# Patient Record
Sex: MALE | Race: WHITE | NOT HISPANIC OR LATINO | Employment: OTHER | URBAN - METROPOLITAN AREA
[De-identification: names, ages, dates, MRNs, and addresses within clinical notes are randomized per-mention and may not be internally consistent; named-entity substitution may affect disease eponyms.]

---

## 2018-01-11 ENCOUNTER — OFFICE VISIT CONVERTED (OUTPATIENT)
Dept: ORTHOPEDIC SURGERY | Facility: CLINIC | Age: 68
End: 2018-01-11
Attending: PHYSICIAN ASSISTANT

## 2018-01-11 ENCOUNTER — CONVERSION ENCOUNTER (OUTPATIENT)
Dept: ORTHOPEDIC SURGERY | Facility: CLINIC | Age: 68
End: 2018-01-11

## 2018-02-09 ENCOUNTER — OFFICE VISIT CONVERTED (OUTPATIENT)
Dept: PULMONOLOGY | Facility: CLINIC | Age: 68
End: 2018-02-09
Attending: INTERNAL MEDICINE

## 2018-04-16 ENCOUNTER — OFFICE VISIT CONVERTED (OUTPATIENT)
Dept: CARDIOLOGY | Facility: CLINIC | Age: 68
End: 2018-04-16
Attending: INTERNAL MEDICINE

## 2018-04-16 ENCOUNTER — CONVERSION ENCOUNTER (OUTPATIENT)
Dept: CARDIOLOGY | Facility: CLINIC | Age: 68
End: 2018-04-16

## 2018-05-31 ENCOUNTER — OFFICE VISIT CONVERTED (OUTPATIENT)
Dept: OTOLARYNGOLOGY | Facility: CLINIC | Age: 68
End: 2018-05-31
Attending: OTOLARYNGOLOGY

## 2018-10-11 ENCOUNTER — OFFICE VISIT CONVERTED (OUTPATIENT)
Dept: PLASTIC SURGERY | Facility: CLINIC | Age: 68
End: 2018-10-11
Attending: PLASTIC SURGERY

## 2018-10-11 ENCOUNTER — CONVERSION ENCOUNTER (OUTPATIENT)
Dept: PLASTIC SURGERY | Facility: CLINIC | Age: 68
End: 2018-10-11

## 2018-10-18 ENCOUNTER — OFFICE VISIT CONVERTED (OUTPATIENT)
Dept: CARDIOLOGY | Facility: CLINIC | Age: 68
End: 2018-10-18
Attending: INTERNAL MEDICINE

## 2018-10-25 ENCOUNTER — OFFICE VISIT CONVERTED (OUTPATIENT)
Dept: ORTHOPEDIC SURGERY | Facility: CLINIC | Age: 68
End: 2018-10-25
Attending: ORTHOPAEDIC SURGERY

## 2019-01-04 ENCOUNTER — OFFICE VISIT CONVERTED (OUTPATIENT)
Dept: PLASTIC SURGERY | Facility: CLINIC | Age: 69
End: 2019-01-04
Attending: PLASTIC SURGERY

## 2019-01-04 ENCOUNTER — CONVERSION ENCOUNTER (OUTPATIENT)
Dept: PLASTIC SURGERY | Facility: CLINIC | Age: 69
End: 2019-01-04

## 2019-02-08 ENCOUNTER — OFFICE VISIT CONVERTED (OUTPATIENT)
Dept: PULMONOLOGY | Facility: CLINIC | Age: 69
End: 2019-02-08
Attending: INTERNAL MEDICINE

## 2019-02-26 ENCOUNTER — PROCEDURE VISIT CONVERTED (OUTPATIENT)
Dept: OTHER | Facility: HOSPITAL | Age: 69
End: 2019-02-26
Attending: PLASTIC SURGERY

## 2019-02-26 ENCOUNTER — HOSPITAL ENCOUNTER (OUTPATIENT)
Dept: PERIOP | Facility: HOSPITAL | Age: 69
Setting detail: HOSPITAL OUTPATIENT SURGERY
Discharge: HOME | End: 2019-02-26
Attending: PLASTIC SURGERY

## 2019-02-26 LAB
GLUCOSE BLD-MCNC: 63 MG/DL (ref 70–99)
GLUCOSE BLD-MCNC: 83 MG/DL (ref 70–99)

## 2019-03-11 ENCOUNTER — CONVERSION ENCOUNTER (OUTPATIENT)
Dept: PLASTIC SURGERY | Facility: CLINIC | Age: 69
End: 2019-03-11

## 2019-03-11 ENCOUNTER — OFFICE VISIT CONVERTED (OUTPATIENT)
Dept: PLASTIC SURGERY | Facility: CLINIC | Age: 69
End: 2019-03-11
Attending: PLASTIC SURGERY

## 2019-05-06 ENCOUNTER — CONVERSION ENCOUNTER (OUTPATIENT)
Dept: CARDIOLOGY | Facility: CLINIC | Age: 69
End: 2019-05-06

## 2019-05-06 ENCOUNTER — OFFICE VISIT CONVERTED (OUTPATIENT)
Dept: CARDIOLOGY | Facility: CLINIC | Age: 69
End: 2019-05-06
Attending: INTERNAL MEDICINE

## 2019-05-30 ENCOUNTER — OFFICE VISIT CONVERTED (OUTPATIENT)
Dept: OTOLARYNGOLOGY | Facility: CLINIC | Age: 69
End: 2019-05-30
Attending: OTOLARYNGOLOGY

## 2019-11-18 ENCOUNTER — OFFICE VISIT CONVERTED (OUTPATIENT)
Dept: CARDIOLOGY | Facility: CLINIC | Age: 69
End: 2019-11-18
Attending: INTERNAL MEDICINE

## 2019-12-10 ENCOUNTER — HOSPITAL ENCOUNTER (OUTPATIENT)
Dept: GASTROENTEROLOGY | Facility: HOSPITAL | Age: 69
Setting detail: HOSPITAL OUTPATIENT SURGERY
Discharge: HOME | End: 2019-12-10
Attending: INTERNAL MEDICINE

## 2020-07-09 ENCOUNTER — OFFICE VISIT CONVERTED (OUTPATIENT)
Dept: ORTHOPEDIC SURGERY | Facility: CLINIC | Age: 70
End: 2020-07-09
Attending: ORTHOPAEDIC SURGERY

## 2020-08-19 ENCOUNTER — OFFICE VISIT CONVERTED (OUTPATIENT)
Dept: CARDIOLOGY | Facility: CLINIC | Age: 70
End: 2020-08-19
Attending: INTERNAL MEDICINE

## 2020-08-19 ENCOUNTER — HOSPITAL ENCOUNTER (OUTPATIENT)
Dept: LAB | Facility: HOSPITAL | Age: 70
Discharge: HOME | End: 2020-08-19
Attending: INTERNAL MEDICINE

## 2020-08-19 LAB
ALBUMIN SERPL-MCNC: 4.1 G/DL (ref 3.5–5)
ALBUMIN/GLOB SERPL: 1.5 {RATIO} (ref 1.4–2.6)
ALP SERPL-CCNC: 70 U/L (ref 56–155)
ALT SERPL-CCNC: 34 U/L (ref 10–40)
ANION GAP SERPL CALC-SCNC: 21 MMOL/L (ref 8–19)
AST SERPL-CCNC: 41 U/L (ref 15–50)
BILIRUB SERPL-MCNC: 0.76 MG/DL (ref 0.2–1.3)
BUN SERPL-MCNC: 17 MG/DL (ref 5–25)
BUN/CREAT SERPL: 11 {RATIO} (ref 6–20)
CALCIUM SERPL-MCNC: 10.1 MG/DL (ref 8.7–10.4)
CHLORIDE SERPL-SCNC: 104 MMOL/L (ref 99–111)
CHOLEST SERPL-MCNC: 133 MG/DL (ref 107–200)
CHOLEST/HDLC SERPL: 3.2 {RATIO} (ref 3–6)
CONV CO2: 23 MMOL/L (ref 22–32)
CONV TOTAL PROTEIN: 6.8 G/DL (ref 6.3–8.2)
CREAT UR-MCNC: 1.55 MG/DL (ref 0.7–1.2)
GFR SERPLBLD BASED ON 1.73 SQ M-ARVRAT: 44 ML/MIN/{1.73_M2}
GLOBULIN UR ELPH-MCNC: 2.7 G/DL (ref 2–3.5)
GLUCOSE SERPL-MCNC: 116 MG/DL (ref 70–99)
HDLC SERPL-MCNC: 41 MG/DL (ref 40–60)
LDLC SERPL CALC-MCNC: 56 MG/DL (ref 70–100)
OSMOLALITY SERPL CALC.SUM OF ELEC: 301 MOSM/KG (ref 273–304)
POTASSIUM SERPL-SCNC: 4.2 MMOL/L (ref 3.5–5.3)
SODIUM SERPL-SCNC: 144 MMOL/L (ref 135–147)
TRIGL SERPL-MCNC: 181 MG/DL (ref 40–150)
VLDLC SERPL-MCNC: 36 MG/DL (ref 5–37)

## 2020-09-02 ENCOUNTER — HOSPITAL ENCOUNTER (OUTPATIENT)
Dept: LAB | Facility: HOSPITAL | Age: 70
Discharge: HOME | End: 2020-09-02
Attending: INTERNAL MEDICINE

## 2020-09-02 LAB
ANION GAP SERPL CALC-SCNC: 15 MMOL/L (ref 8–19)
BUN SERPL-MCNC: 16 MG/DL (ref 5–25)
BUN/CREAT SERPL: 11 {RATIO} (ref 6–20)
CALCIUM SERPL-MCNC: 10.2 MG/DL (ref 8.7–10.4)
CHLORIDE SERPL-SCNC: 103 MMOL/L (ref 99–111)
CONV CO2: 26 MMOL/L (ref 22–32)
CREAT UR-MCNC: 1.49 MG/DL (ref 0.7–1.2)
GFR SERPLBLD BASED ON 1.73 SQ M-ARVRAT: 46 ML/MIN/{1.73_M2}
GLUCOSE SERPL-MCNC: 109 MG/DL (ref 70–99)
OSMOLALITY SERPL CALC.SUM OF ELEC: 292 MOSM/KG (ref 273–304)
POTASSIUM SERPL-SCNC: 4.1 MMOL/L (ref 3.5–5.3)
SODIUM SERPL-SCNC: 140 MMOL/L (ref 135–147)

## 2020-09-18 ENCOUNTER — HOSPITAL ENCOUNTER (OUTPATIENT)
Dept: LAB | Facility: HOSPITAL | Age: 70
Discharge: HOME | End: 2020-09-18
Attending: INTERNAL MEDICINE

## 2020-09-18 LAB
ANION GAP SERPL CALC-SCNC: 15 MMOL/L (ref 8–19)
BUN SERPL-MCNC: 16 MG/DL (ref 5–25)
BUN/CREAT SERPL: 12 {RATIO} (ref 6–20)
CALCIUM SERPL-MCNC: 9.2 MG/DL (ref 8.7–10.4)
CHLORIDE SERPL-SCNC: 104 MMOL/L (ref 99–111)
CONV CO2: 24 MMOL/L (ref 22–32)
CREAT UR-MCNC: 1.29 MG/DL (ref 0.7–1.2)
GFR SERPLBLD BASED ON 1.73 SQ M-ARVRAT: 55 ML/MIN/{1.73_M2}
GLUCOSE SERPL-MCNC: 93 MG/DL (ref 70–99)
MAGNESIUM SERPL-MCNC: 1.74 MG/DL (ref 1.6–2.3)
OSMOLALITY SERPL CALC.SUM OF ELEC: 289 MOSM/KG (ref 273–304)
POTASSIUM SERPL-SCNC: 3.7 MMOL/L (ref 3.5–5.3)
SODIUM SERPL-SCNC: 139 MMOL/L (ref 135–147)

## 2021-01-22 ENCOUNTER — HOSPITAL ENCOUNTER (OUTPATIENT)
Dept: OTHER | Facility: HOSPITAL | Age: 71
Discharge: HOME | End: 2021-01-22
Attending: INTERNAL MEDICINE

## 2021-02-18 ENCOUNTER — HOSPITAL ENCOUNTER (OUTPATIENT)
Dept: VACCINE CLINIC | Facility: HOSPITAL | Age: 71
Discharge: HOME | End: 2021-02-18
Attending: INTERNAL MEDICINE

## 2021-03-01 ENCOUNTER — HOSPITAL ENCOUNTER (OUTPATIENT)
Dept: LAB | Facility: HOSPITAL | Age: 71
Discharge: HOME | End: 2021-03-01
Attending: INTERNAL MEDICINE

## 2021-03-01 LAB
ALBUMIN SERPL-MCNC: 4.3 G/DL (ref 3.5–5)
ALBUMIN/GLOB SERPL: 1.6 {RATIO} (ref 1.4–2.6)
ALP SERPL-CCNC: 80 U/L (ref 56–155)
ALT SERPL-CCNC: 33 U/L (ref 10–40)
ANION GAP SERPL CALC-SCNC: 16 MMOL/L (ref 8–19)
AST SERPL-CCNC: 34 U/L (ref 15–50)
BILIRUB SERPL-MCNC: 0.84 MG/DL (ref 0.2–1.3)
BUN SERPL-MCNC: 17 MG/DL (ref 5–25)
BUN/CREAT SERPL: 12 {RATIO} (ref 6–20)
CALCIUM SERPL-MCNC: 9.2 MG/DL (ref 8.7–10.4)
CHLORIDE SERPL-SCNC: 106 MMOL/L (ref 99–111)
CHOLEST SERPL-MCNC: 109 MG/DL (ref 107–200)
CHOLEST/HDLC SERPL: 2.9 {RATIO} (ref 3–6)
CONV CO2: 23 MMOL/L (ref 22–32)
CONV TOTAL PROTEIN: 7 G/DL (ref 6.3–8.2)
CREAT UR-MCNC: 1.37 MG/DL (ref 0.7–1.2)
GFR SERPLBLD BASED ON 1.73 SQ M-ARVRAT: 51 ML/MIN/{1.73_M2}
GLOBULIN UR ELPH-MCNC: 2.7 G/DL (ref 2–3.5)
GLUCOSE SERPL-MCNC: 109 MG/DL (ref 70–99)
HDLC SERPL-MCNC: 38 MG/DL (ref 40–60)
LDLC SERPL CALC-MCNC: 37 MG/DL (ref 70–100)
MAGNESIUM SERPL-MCNC: 2.12 MG/DL (ref 1.6–2.3)
OSMOLALITY SERPL CALC.SUM OF ELEC: 294 MOSM/KG (ref 273–304)
POTASSIUM SERPL-SCNC: 4.1 MMOL/L (ref 3.5–5.3)
SODIUM SERPL-SCNC: 141 MMOL/L (ref 135–147)
TRIGL SERPL-MCNC: 168 MG/DL (ref 40–150)
VLDLC SERPL-MCNC: 34 MG/DL (ref 5–37)

## 2021-03-08 ENCOUNTER — OFFICE VISIT CONVERTED (OUTPATIENT)
Dept: CARDIOLOGY | Facility: CLINIC | Age: 71
End: 2021-03-08
Attending: INTERNAL MEDICINE

## 2021-03-09 ENCOUNTER — OFFICE VISIT CONVERTED (OUTPATIENT)
Dept: CARDIOLOGY | Facility: CLINIC | Age: 71
End: 2021-03-09
Attending: INTERNAL MEDICINE

## 2021-03-22 ENCOUNTER — HOSPITAL ENCOUNTER (OUTPATIENT)
Dept: LAB | Facility: HOSPITAL | Age: 71
Discharge: HOME | End: 2021-03-22
Attending: INTERNAL MEDICINE

## 2021-03-22 LAB
ANION GAP SERPL CALC-SCNC: 13 MMOL/L (ref 8–19)
BUN SERPL-MCNC: 17 MG/DL (ref 5–25)
BUN/CREAT SERPL: 12 {RATIO} (ref 6–20)
CALCIUM SERPL-MCNC: 9.3 MG/DL (ref 8.7–10.4)
CHLORIDE SERPL-SCNC: 106 MMOL/L (ref 99–111)
CONV CO2: 26 MMOL/L (ref 22–32)
CREAT UR-MCNC: 1.4 MG/DL (ref 0.7–1.2)
GFR SERPLBLD BASED ON 1.73 SQ M-ARVRAT: 49 ML/MIN/{1.73_M2}
GLUCOSE SERPL-MCNC: 108 MG/DL (ref 70–99)
OSMOLALITY SERPL CALC.SUM OF ELEC: 294 MOSM/KG (ref 273–304)
POTASSIUM SERPL-SCNC: 4.2 MMOL/L (ref 3.5–5.3)
SODIUM SERPL-SCNC: 141 MMOL/L (ref 135–147)
T4 FREE SERPL-MCNC: 1.3 NG/DL (ref 0.9–1.8)
TSH SERPL-ACNC: 5.14 M[IU]/L (ref 0.27–4.2)

## 2021-05-10 NOTE — PROCEDURES
Procedure Note      Patient Name: Percy Velarde   Patient ID: [id number]   Sex: Male   YOB: 1950    Primary Care Provider: Agustin Balbuena MD   Referring Provider: Norman Bettencourt MD    Visit Date: March 9, 2021    Provider: Jackie Ochoa MD   Location: [facility]   Location Address: [address] [place] [address], [facility] [[email or web address] A   [place], [place]  [id number]   Location Phone: [phone]          FINAL REPORT   HOLTER MONITOR REPORT  Date: 03/09/2021   Indication: Dizziness and Bradycardia      Interpretation Date:  04/09/2021    14-DAY HOLTER MONITOR    FINDINGS:   The predominant rhythm was sinus bradycardia to sinus tachycardia with sinus arrhythmia and IVCD. The maximum heart rate was 114 beats per minute on day 10 at 4:41 PM. The minimum heart rate was 39 beats per minute on day 8 at 12:37 AM. Average heart rate was 57 beats per minute. There were 241 PVCs with a burden of 0.02%. There were 163 PACs with a burden of 0.01%. There were 4 episodes of supraventricular tachycardia with the longest episode 10 beats and the fastest episode 92 beats per minute, both on day 13 at 6:03 PM. Diary events were entered. There was one patient-triggered event. No arrhythmias recorded during patient symptomatology. Underlying rhythm was 60 beats per minute and the patient triggered the monitor with dizziness.     CONCLUSION:  Fourteen-day monitor showed predominantly sinus rhythm with occasional PVCs, PACs, and a rare case of nonsustained supraventricular tachycardia. Mean heart rate is on the low side at 57 beats per minute.      Jackie Ochoa MD, [facility]  PM/pap                   Electronically Signed by: Jaqui Landry-, Other -Author on April 13, 2021 10:17:06 AM  Electronically Co-signed by: Jackie Ochoa MD -Reviewer on April 20, 2021 01:28:15 PM

## 2021-05-10 NOTE — H&P
History and Physical      Patient Name: Percy Velarde   Patient ID: [id number]   Sex: Male   YOB: 1950    Primary Care Provider: Agustin Balbuena MD   Referring Provider: Norman Bettencourt MD    Visit Date: July 9, 2020    Provider: Bao Vigil MD   Location: [facility]   Location Address: [address] [place] [address]  [id number]   Location Phone: [phone]          Chief Complaint  · Right elbow pain      History Of Present Illness  Percy Velarde is a 70 year old /White male who presents today to [facility]. He is here for evaluation of his right elbow. He has had symptoms for 3-4 months, some days it is pretty bad where he cant  his coffee cup. He localizes it over his lateral elbow. No real treatment has been started.       Past Medical History  Aftercare following right shoulder arthroscopy; Arthritis; Colonic Polyps, Personal History of; COPD; ETD (Eustachian tube dysfunction), bilateral; GERD (gastroesophageal reflux disease); Gout; Hearing loss; History of Right shoulder arthroscopy; Hyperlipemia; Hypertension; Hypertension, essential; Hypothyroidism; Limb Swelling; Lipoma; Low back pain; Melanoma; Nasal septal ulcer; Otalgia; Recurrent epistaxis; Recurrent otitis media; Right Shoulder: Rotator Cuff Tear; Seasonal allergies; Sleep apnea; Thyroid disorder; Tinnitus, bilateral         Past Surgical History  Cholecystectomy; Colonoscopy; EGD; Eye Implant; Gallbladder; Joint Surgery; Lipoma Removal; Melanoma excision; Myringotomy: Ear Tubes; Tonsillectomy         Medication List  Afrin (oxymetazoline) 0.05 % nasal spray,non-aerosol; Allergy Relief (fexofenadine) 180 mg oral tablet; allopurinol 100 mg oral tablet; Ambien 10 mg oral tablet; aspirin 81 mg oral tablet,delayed release (DR/EC); clindamycin phosphate 1 % topical gel; fluticasone 50 mcg/actuation nasal spray,suspension; levothyroxine 125 mcg oral tablet; losartan-hydrochlorothiazide 50-12.5  "mg oral tablet; montelukast 10 mg oral tablet; pantoprazole 40 mg oral tablet,delayed release (DR/EC); pravastatin 80 mg oral tablet; Suprep Bowel Prep Kit 17.5-3.13-1.6 gram oral recon soln; trazodone 50 mg oral tablet; Vitamin C 500 mg oral capsule, extended release; Voltaren 1 % topical gel         Allergy List  NO KNOWN DRUG ALLERGIES         Family Medical History  Heart Disease; Diabetes, unspecified type; Colon Cancer; Heart Attack (MI); Diabetes         Social History  Alcohol Use (Current some day); lives alone; lives with spouse; .; Recreational Drug Use (Never); Retired; Smokeless tobacco (Former); Tobacco (Former);          Review of Systems  · Constitutional  o Denies  o : fever, chills, weight loss  · Cardiovascular  o Denies  o : chest pain, shortness of breath  · Gastrointestinal  o Denies  o : liver disease, heartburn, nausea, blood in stools  · Genitourinary  o Denies  o : painful urination, blood in urine  · Integument  o Denies  o : rash, itching  · Neurologic  o Denies  o : headache, weakness, loss of consciousness  · Musculoskeletal  o Denies  o : painful, swollen joints  · Psychiatric  o Denies  o : drug/alcohol addiction, anxiety, depression      Vitals  Date Time BP Position Site L\R Cuff Size HR RR TEMP (F) WT  HT  BMI kg/m2 BSA m2 O2 Sat        07/09/2020 08:43 AM      63 - R   240lbs 0oz 5'  10\" 34.44 2.32 95 %          Physical Examination  · Constitutional  o Appearance  o : well developed, well-nourished, no obvious deformities present  · Head and Face  o Head  o :   § Inspection  § : normocephalic  o Face  o :   § Inspection  § : no facial lesions  · Eyes  o Conjunctivae  o : conjunctivae normal  o Sclerae  o : sclerae white  · Ears, Nose, Mouth and Throat  o Ears  o :   § External Ears  § : appearance within normal limits  § Hearing  § : intact  o Nose  o :   § External Nose  § : appearance normal  · Neck  o Inspection/Palpation  o : normal appearance  o Range of " Motion  o : full range of motion  · Respiratory  o Respiratory Effort  o : breathing unlabored  o Inspection of Chest  o : normal appearance  o Auscultation of Lungs  o : no audible wheezing or rales  · Cardiovascular  o Heart  o : regular rate  · Gastrointestinal  o Abdominal Examination  o : soft and non-tender  · Skin and Subcutaneous Tissue  o General Inspection  o : intact, no rashes  · Psychiatric  o General  o : Alert and oriented x3  o Judgement and Insight  o : judgment and insight intact  o Mood and Affect  o : mood normal, affect appropriate  · Right Elbow  o Inspection  o : He is tender to palpation over his lateral epicondyle. Positive provocative testing for tennis elbow. Sensation intact. Pulse is normal.  strength is good.   · In Office Procedures  o View  o : AP/LATERAL  o Site  o : right, elbow   o Indication  o : Right arm pain   o Study  o : X-rays ordered, taken in the office, and reviewed today.  o Xray  o : X-rays show no fractures.   o Comparative Data  o : No comparative data found          Assessment  · Pain: Elbow     719.42/M25.529  · Lateral epicondylitis, right elbow.     726.32/M77.10      Plan  · Orders  o Elbow (Right) 2 views X-Ray [facility] ([id number]) - 719.42/M25.529 - 07/09/2020  · Medications  o Medications have been Reconciled  o Transition of Care or Provider Policy  · Instructions  o Reviewed the patient's Past Medical, Social, and Family history as well as the ROS at today's visit, no changes.  o Call or return if worsening symptoms.  o This note is transcribed by Claritza Molina /[email or web address]  o Going to try Voltaren gel, some stretches and see how he does.             Electronically Signed by: Claritza Molina, -Author on July 10, 2020 11:40:34 AM  Electronically Co-signed by: Bao Vigil MD -Reviewer on July 14, 2020 03:16:20 PM

## 2021-05-13 NOTE — PROGRESS NOTES
Progress Note      Patient Name: Percy Velarde   Patient ID: [id number]   Sex: Male   YOB: 1950    Primary Care Provider: Agustin Balbuena MD   Referring Provider: Norman Bettencourt MD    Visit Date: August 19, 2020    Provider: Jackie Ochoa MD   Location: [facility]   Location Address: [address] [place] [address], [facility]   [place]  [id number]   Location Phone: [phone]          Chief Complaint  · Coronary artery disease   · Hypertension   · Hyperlipidemia       History Of Present Illness  REFERRING PROVIDER: Luca Vance MD   Percy Velarde is a 70-year-old gentleman with coronary artery disease, hypertension, hyperlipidemia, who is doing well. He denies any chest pain, shortness of breath, PND, orthopnea, palpitations, dizziness or syncope. His major complaint is swelling in his legs, which has become slightly worse over time.   PAST MEDICAL HISTORY: Positive for coronary artery disease (catheterization in 2003), hypothyroidism, chronic kidney disease, hypertension and hyperlipidemia.   FAMILY HISTORY: Positive for diabetes. Negative for hypertension or heart disease.   PSYCHOSOCIAL HISTORY: No history of mood changes or depression. He drinks a moderate amount of alcohol. He previously used tobacco but quit.   CURRENT MEDICATIONS: include Levothyroxine 137 mcg daily; Losartan 50 mg daily; Protonix 40 mg daily; Allopurinol 100 mg daily; Montelukast 10 mg daily; Atorvastatin 40 mg daily; Trazodone 50 mg b.i.d. p.r.n.; Fluticasone spray; Vitamin C; aspirin 81 mg daily; HCTZ 12.5 mg daily. The dosage and frequency of the medications were reviewed with the patient.       Review of Systems  · Cardiovascular  o Admits  o : swelling (feet, ankles, hands)  o Denies  o : palpitations (fast, fluttering, or skipping beats), shortness of breath while walking or lying flat, chest pain or angina pectoris   · Respiratory  o Denies  o : chronic or frequent cough, asthma or  "wheezing      Vitals  Date Time BP Position Site L\R Cuff Size HR RR TEMP (F) WT  HT  BMI kg/m2 BSA m2 O2 Sat        08/19/2020 08:35 /82 Sitting    56 - R   241lbs 16oz 5'  9\" 35.74 2.31           Physical Examination  · Constitutional  o Appearance  o : Awake, alert, in no acute distress.  · Eyes  o Conjunctivae  o : Conjunctivae normal.  · Ears, Nose, Mouth and Throat  o Oral Cavity  o :   § Oral Mucosa  § : Normal.  · Neck  o Inspection/Palpation/Auscultation  o : No lymphadenopathy. No JVD. No bruit. Good carotid upstroke.  · Respiratory  o Respiratory  o : Increased AP diameter with diminished breath sounds. Prolonged expiration. No rales or rhonchi. No dullness on percussion.   · Cardiovascular  o Heart  o : PMI is not well felt. S1, S2 are normal. No S3. S4+. There is a 1-2/6 ejection systolic murmur at the base.   o Peripheral Vascular System  o :   § Extremities  § : Good femoral and pedal pulses. No pedal edema.  · Gastrointestinal  o Abdominal Examination  o : Soft. No masses or tenderness felt. No hepatosplenomegaly. Abdominal aorta is not palpable.     Blood work from this morning is pending.    Blood pressure log is excellent.           Assessment     1.  Hypertension controlled.  2.  Pedal edema, persistent.  3.  Coronary artery disease, without angina pectoris.  4.  Hyperlipidemia, labs pending from today.  5.  Morbid obesity, no weight loss.       Plan     1.  Discussed with him need for weight loss and reducing salt intake in his diet.  2.  Will discontinue HCTZ and start him on Spironolactone/HCTZ 25/25 mg every other day.  3.  Obtain BMP in one month and may have to adjust the dosage if renal function gets worse.  4.  Follow up in 6 months.    Jackie Ochoa M.D., [facility]  pmm/cat           This note was transcribed by Cathryn Jo.  dmd/pmm  The above service was transcribed by Cathryn Jo, and I attest to the accuracy of the note.  PMM               Electronically Signed by: Cathryn" Sandro-, -Author on August 21, 2020 04:51:37 AM  Electronically Co-signed by: Jackie Ochoa MD -Reviewer on August 22, 2020 03:58:20 PM

## 2021-05-14 VITALS
BODY MASS INDEX: 36.14 KG/M2 | WEIGHT: 244 LBS | SYSTOLIC BLOOD PRESSURE: 144 MMHG | HEIGHT: 69 IN | HEART RATE: 54 BPM | DIASTOLIC BLOOD PRESSURE: 78 MMHG

## 2021-05-14 NOTE — PROGRESS NOTES
"   Progress Note      Patient Name: Percy Velarde   Patient ID: [id number]   Sex: Male   YOB: 1950    Primary Care Provider: Agustin Balbuena MD   Referring Provider: Norman Bettencourt MD    Visit Date: March 8, 2021    Provider: Jackie Ochoa MD   Location: [facility]   Location Address: [address] [place] [address], [address]   [place]  [id number]   Location Phone: [phone]          Chief Complaint  · Coronary artery disease   · Hypertension   · Hyperlipidemia   · Chronic kidney disease       History Of Present Illness  REFERRING PROVIDER: Agustin Balbuena MD   Percy Velarde is a 71-year-old gentleman with chronic kidney disease and hyperlipidemia who has been having problems with fatigue and easily getting tired. He is trying to lose weight. He had gained quite a bit of weight and has been able to lose a few pounds. He is two pounds heavier than last visit 7 months ago. He denies chest pain, palpitations, or syncope. He has occasional dizziness.   PAST MEDICAL HISTORY: Coronary artery disease (catheterization in 2003); hypothyroidism; chronic kidney disease; hypertension; hyperlipidemia.   PSYCHOSOCIAL HISTORY: Moderate alcohol use. Previously smoked but quit.   CURRENT MEDICATIONS: Medication list was reviewed, including Spironolactone-HCTZ 25-25 mg 1/2 tablet every other day, and is as documented.      ALLERGIES: No known drug allergies.       Review of Systems  · Cardiovascular  o Admits  o : shortness of breath while walking or lying flat  o Denies  o : palpitations (fast, fluttering, or skipping beats), swelling (feet, ankles, hands), chest pain or angina pectoris   · Respiratory  o Denies  o : chronic or frequent cough      Vitals  Date Time BP Position Site L\R Cuff Size HR RR TEMP (F) WT  HT  BMI kg/m2 BSA m2 O2 Sat FR L/min FiO2 HC       03/08/2021 08:41 /78 Sitting    54 - R   243lbs 16oz 5'  9\" 36.03 2.32       03/08/2021 08:41 /78 Sitting    54 - R           "          His blood pressure log reveals mild systolic hypertension.       Physical Examination  · Constitutional  o Appearance  o : Awake, alert, in no acute distress.  · Eyes  o Conjunctivae  o : Conjunctivae normal.  · Ears, Nose, Mouth and Throat  o Oral Cavity  o :   § Oral Mucosa  § : Normal.  · Neck  o Inspection/Palpation/Auscultation  o : No lymphadenopathy. No JVD. No bruit. Good carotid upstroke.  · Respiratory  o Respiratory  o : Increased AP diameter with diminished breath sounds. Prolonged expiration. No rales or rhonchi. No dullness on percussion.   · Cardiovascular  o Heart  o : PMI is not well felt. S1, S2 are normal. No S3. S4+. There is a 1-2/6 ejection systolic murmur at the base.   o Peripheral Vascular System  o :   § Extremities  § : Good femoral and pedal pulses. No pedal edema.  · Gastrointestinal  o Abdominal Examination  o : Soft. No masses or tenderness felt. No hepatosplenomegaly. Abdominal aorta is not palpable.  · EKG  o Indications  o : Coronary artery disease, fatigue.  o Results  o : Sinus bradycardia, right bundle branch block.   o Comparison  o : The heart rate is slightly slower, but otherwise no change.   · Labs  o Labs  o : BUN 17, creatinine 1.37, GFR 51, HDL 38, LDL 37, triglyceride 168.          Assessment     1.  Coronary artery disease without angina pectoris.  2.  Hypertension, needs tighter control.   3.  Hyperlipidemia, LDL at goal, high triglycerides.  4.  Fatigue, weakness, and tiredness with occasional dizziness secondary to bradycardia.       Plan     1.  Obtain a 14-day monitor for his bradycardia.  2.  Increase Losartan to 50 mg b.i.d.  3.  Do a two-week monitor for his symptomatic bradycardia and dizziness.  4.  Obtain a thyroid panel and BMP in two weeks.  5.  Followup is contingent upon the results of the above testing.      Jackie Ochoa MD, [facility]  PM/pap               Electronically Signed by: Jaqui Landry-, Other -Author on March 13, 2021  09:35:59 AM  Electronically Co-signed by: Jackie Ochoa MD -Reviewer on March 13, 2021 04:11:38 PM

## 2021-05-15 VITALS
SYSTOLIC BLOOD PRESSURE: 138 MMHG | BODY MASS INDEX: 35.84 KG/M2 | DIASTOLIC BLOOD PRESSURE: 82 MMHG | WEIGHT: 242 LBS | HEART RATE: 56 BPM | HEIGHT: 69 IN

## 2021-05-15 VITALS
OXYGEN SATURATION: 94 % | DIASTOLIC BLOOD PRESSURE: 64 MMHG | TEMPERATURE: 98 F | BODY MASS INDEX: 34.23 KG/M2 | SYSTOLIC BLOOD PRESSURE: 132 MMHG | WEIGHT: 239.12 LBS | RESPIRATION RATE: 16 BRPM | HEIGHT: 70 IN | HEART RATE: 63 BPM

## 2021-05-15 VITALS
HEIGHT: 70 IN | SYSTOLIC BLOOD PRESSURE: 150 MMHG | BODY MASS INDEX: 34.36 KG/M2 | WEIGHT: 240 LBS | DIASTOLIC BLOOD PRESSURE: 80 MMHG | HEART RATE: 46 BPM

## 2021-05-15 VITALS — OXYGEN SATURATION: 95 % | BODY MASS INDEX: 34.36 KG/M2 | HEIGHT: 70 IN | WEIGHT: 240 LBS | HEART RATE: 63 BPM

## 2021-05-15 VITALS
HEART RATE: 56 BPM | HEIGHT: 70 IN | SYSTOLIC BLOOD PRESSURE: 136 MMHG | BODY MASS INDEX: 34.93 KG/M2 | DIASTOLIC BLOOD PRESSURE: 88 MMHG | WEIGHT: 244 LBS

## 2021-05-16 VITALS — WEIGHT: 245 LBS | OXYGEN SATURATION: 98 % | BODY MASS INDEX: 36.29 KG/M2 | HEIGHT: 69 IN | HEART RATE: 49 BPM

## 2021-05-16 VITALS — BODY MASS INDEX: 32.93 KG/M2 | HEIGHT: 70 IN | WEIGHT: 230 LBS | OXYGEN SATURATION: 96 % | HEART RATE: 55 BPM

## 2021-05-16 VITALS
OXYGEN SATURATION: 95 % | HEIGHT: 69 IN | HEART RATE: 52 BPM | DIASTOLIC BLOOD PRESSURE: 75 MMHG | SYSTOLIC BLOOD PRESSURE: 145 MMHG | BODY MASS INDEX: 34.07 KG/M2 | WEIGHT: 230 LBS

## 2021-05-16 VITALS
HEART RATE: 50 BPM | OXYGEN SATURATION: 96 % | DIASTOLIC BLOOD PRESSURE: 81 MMHG | BODY MASS INDEX: 36.51 KG/M2 | SYSTOLIC BLOOD PRESSURE: 147 MMHG | HEIGHT: 69 IN | WEIGHT: 246.5 LBS

## 2021-05-16 VITALS
SYSTOLIC BLOOD PRESSURE: 166 MMHG | BODY MASS INDEX: 34.93 KG/M2 | HEIGHT: 70 IN | HEART RATE: 46 BPM | DIASTOLIC BLOOD PRESSURE: 92 MMHG | WEIGHT: 244 LBS

## 2021-05-16 VITALS
DIASTOLIC BLOOD PRESSURE: 72 MMHG | WEIGHT: 238.37 LBS | SYSTOLIC BLOOD PRESSURE: 142 MMHG | BODY MASS INDEX: 34.12 KG/M2 | HEIGHT: 70 IN | HEART RATE: 65 BPM | OXYGEN SATURATION: 95 % | RESPIRATION RATE: 16 BRPM

## 2021-05-16 VITALS
DIASTOLIC BLOOD PRESSURE: 84 MMHG | HEART RATE: 50 BPM | SYSTOLIC BLOOD PRESSURE: 136 MMHG | WEIGHT: 240 LBS | BODY MASS INDEX: 34.36 KG/M2 | HEIGHT: 70 IN

## 2021-05-16 VITALS
SYSTOLIC BLOOD PRESSURE: 155 MMHG | DIASTOLIC BLOOD PRESSURE: 82 MMHG | BODY MASS INDEX: 36.73 KG/M2 | OXYGEN SATURATION: 93 % | WEIGHT: 248 LBS | HEART RATE: 67 BPM | HEIGHT: 69 IN

## 2021-05-28 VITALS
SYSTOLIC BLOOD PRESSURE: 149 MMHG | WEIGHT: 240 LBS | OXYGEN SATURATION: 98 % | TEMPERATURE: 98.6 F | HEART RATE: 51 BPM | RESPIRATION RATE: 16 BRPM | DIASTOLIC BLOOD PRESSURE: 80 MMHG | BODY MASS INDEX: 35.38 KG/M2 | WEIGHT: 247.12 LBS | HEIGHT: 70 IN | TEMPERATURE: 98.3 F | BODY MASS INDEX: 35.55 KG/M2 | SYSTOLIC BLOOD PRESSURE: 144 MMHG | RESPIRATION RATE: 12 BRPM | HEART RATE: 64 BPM | HEIGHT: 69 IN | OXYGEN SATURATION: 95 % | DIASTOLIC BLOOD PRESSURE: 70 MMHG

## 2021-05-28 NOTE — PROGRESS NOTES
Patient: YOU SILVA     Acct: [id number]     Report: #[id number]  UNIT #: [id number]     : 1950    Encounter Date:2019  PRIMARY CARE: KANDACE MAGANA  ***Signed***  --------------------------------------------------------------------------------------------------------------------  Chief Complaint      Encounter Date      2019            Primary Care Provider      KANDACE MAGANA            Referring Provider      SELF,REFERRED            Patient Complaint      Patient is complaining of      Pt here for 1 year follow up/[facility]            VITALS      Height 5 ft 10 in / 177.8 cm      Weight 247 lbs 2 oz / 112.504329 kg      BSA 2.28 m2      BMI 35.5 kg/m2      Temperature 98.3 F / 36.83 C - Oral      Pulse 64      Respirations 12      Blood Pressure 149/70 Sitting, Left Arm      Pulse Oximetry 95%, room air            HPI      The patient is a very pleasant 69 year old white male here today to follow up on    sleep apnea.  The patient has very mild sleep apnea and does not use his mask.      He had some issues with nasal congestion, now is trying to use it after his     nasal congestion is improved.            ROS      Constitutional:  Complains of: Fatigue; Denies: Fever, Weight gain, Weight loss,    Chills, Insomnia, Other      Respiratory/Breathing:  Denies: Shortness of air, Wheezing, Cough, Hemoptysis,     Pleuritic pain, Other      Endocrine:  Denies: Polydipsia, Polyuria, Heat/cold intolerance, Diabetes, Other      Eyes:  Denies: Blurred vision, Vision Changes, Other      Ears, nose, mouth, throat:  Denies: Mouth lesions, Thrush, Throat pain,     Hoarseness, Allergies/Hay Fever, Post Nasal Drip, Headaches, Recent Head Injury,    Nose Bleeding, Neck Stiffness, Thyroid Mass, Hearing Loss, Ear Fullness, Dry     Mouth, Nasal or Sinus Pain, Dry Lips, Nasal discharge, Nasal congestion, Other      Cardiovascular:  Denies: Palpitations, Syncope, Claudication, Chest Pain, Wake     up  Gasping for air, Leg Swelling, Irregular Heart Rate, Cyanosis, Dyspnea on     Exertion, Other      Gastrointestinal:  Denies: Nausea, Constipation, Diarrhea, Abdominal pain,     Vomiting, Difficulty Swallowing, Reflux/Heartburn, Dysphagia, Jaundice,     Bloating, Melena, Bloody stools, Other      Genitourinary:  Denies: Urinary frequency, Incontinence, Hematuria, Urgency,     Nocturia, Dysuria, Testicular problems, Other      Musculoskeletal:  Denies: Joint Pain, Joint Stiffness, Joint Swelling, Myalgias,    Other      Hematologic/lymphatic:  DENIES: Lymphadenopathy, Bruising, Bleeding tendencies,     Other      Neurological:  Denies: Headache, Numbness, Weakness, Seizures, Other      Psychiatric:  Denies: Anxiety, Appropriate Effect, Depression, Other      Sleep:  No: Excessive daytime sleep, Morning Headache?, Snoring, Insomnia?, Stop    breathing at sleep?, Other      Integumentary:  Denies: Rash, Dry skin, Skin Warm to Touch, Other      Immunologic/Allergic:  Denies: Latex allergy, Seasonal allergies, Asthma,     Urticaria, Eczema, Other      Immunization status:  No: Up to date            FAMILY/SOCIAL/MEDICAL HX      Surgical History:  Yes: Bowel Surgery (COLONOSCOPIES X3), Cholecystectomy (LAP),    Head Surgery (EAR TUBES X3), Oral Surgery (TONSILLECTOMY), Other Surgeries     (melanoma removal from upper right arm)      Heart - Family Hx:  Mother, Father      Diabetes - Family Hx:  Brother      Cancer/Type - Family Hx:  Uncle      Other Family Medical History:  Father (TB)      Is Father Still Living?:  No      Is Mother Still Living?:  No      Social History:  Alcohol Use (1-2 DRINKS A WEEK)      Smoking status:  Former smoker (1/2  -1 PPD QUIT IN 1975 / CHEWING TOBACCO QUIT     IN 1995)      Occupation:  RETIRED [employer]/      Anticoagulation Therapy:  No      Antibiotic Prophylaxis:  No      Medical History:  Yes: Arthritis, Chemotherapy/Cancer (H/O MELANOMA RT ARM ),     Chronic Bronchitis/COPD  (NO INHALERS), Deafness or Ringing Ears (BILATERAL),     Diabetes (BORDERLINE DIABETIC), Hemorrhoids/Rectal Prob (ACID REFLUX,  H/O COLON    POLYPS, ), High Blood Pressure (ON MEDS TO CONTROL), Shortness Of Breath     (PULMONARY NODULES, FOLLOWS W/DR. CONROY); No: Blood Disease, Seizures, Heart     Attack (DENIES CHEST PAIN AT THIS TIME HAS HX OF , ), Sinus Trouble,     Miscellaneous Medical/oth      Psychiatric History      None            PREVENTION      Hx Influenza Vaccination:  Yes      Date Influenza Vaccine Given:  Nov 1, 2018      Influenza Vaccine Declined:  No      2 or More Falls Past Year?:  No      Fall Past Year with Injury?:  No      Hx Pneumococcal Vaccination:  Yes      Encouraged to follow-up with:  PCP regarding preventative exams.      Chart initiated by      Na Barrow MA            ALLERGIES/MEDICATIONS      Allergies:        Coded Allergies:             NO KNOWN ALLERGIES (Unverified , 2/8/19)      Medications    Last Reconciled on 2/8/19 13:16 by KB CONROY MD      Losartan/Hydrochlorothiazide (Losartan/Hctz 50/12.5 Mg*) 1 Each Tablet      1 TAB PO QDAY, #30 TAB 0 Refills         Reported         2/8/19       Nitroglycerin (Nitroglycerin) 0.4 Mg Tablet      0.4 MG SL ASDIR, TAB         Reported         4/6/15       Ervin-Fluticasone (Fluticasone 50 mcg) 16 Gm Naspr      2 PUFFS NARE EACH HS PRN for CONGESTION, #1 BOTTLE 0 Refills         Reported         4/6/15       Allopurinol (Allopurinol) 100 Mg Tablet      100 MG PO HS, #30 TAB 0 Refills         Reported         4/6/15       Levothyroxine (Levothyroxine) 0.125 Mg Tablet      0.125 MG PO QDAY@07, #30 TAB 0 Refills         Reported         4/6/15       Montelukast Sodium (Singulair*) 10 Mg Tablet      10 MG PO HS, #30 TAB 0 Refills         Prov: Kb Conroy         4/3/15       Ascorbic Acid (Vitamin C*) 500 Mg Tablet      500 MG PO QDAY, TAB         Reported         7/17/14       Aspirin EC (Aspirin EC) 81 Mg Tabec      81 TAB  PO 3XWEEK         Reported         7/27/11       Zolpidem Tartrate (Ambien*) 10 Mg Tab      5 MG PO HS PRN for INSOMNIA, TAB         Reported         7/25/11       Pantoprazole (Protonix*) 40 Mg Tablet      40 MG PO QDAY, TAB         Reported         7/25/11       Pravastatin Sodium (Pravachol*) 80 Mg Tablet      80 MG PO HS         Reported         7/25/11      Current Medications      Current Medications Reviewed 2/8/19            EXAM      GEN-patient appears stated age resting comfortable in no acute distress      Eyes-PERRL,  conjunctiva are normal in appearance extraocular muscles are     intact, no scleral icterus      Nasal-both nares are patent turbinates appear normal no polyps seen no nasal     discharge or ulcerations      Mouth normal dentition, no erythema no ulcerations oropharynx appears normal no     exudate no evidence of postnasal drip, MP2      Neck-there are no palpable supraclavicular or cervical adenopathy, thyroid is n    ormal in appearance no apparent nodules, there is no inspiratory or expiratory     stridor      Respiratory-patient exhibits normal work of breathing, speaking in full     sentences without difficulty, the chest is normal in appearance, clear to     auscultation with no wheezes rales or rhonchi, chest is normal to percussion on     both the right and left sides      Cardiovascular-the heart rate is normal and regular S1 and S2 present with no     murmur or extra heart sounds, there is no JVD or pedal edema present      GI-the abdomen is normal in appearance, bowel sounds present and normal in all     quadrants no hepatosplenomegaly or masses felt      Extremities-no clubbing is present, pulses present in all extremities, capillary    refill time is normal      Skin-skin is normal in appearance it is warm and dry, there rashes present on     the face, no evidence of cyanosis      Neurological-the patient is alert and oriented to time place and person, moves     all 4  extremities, normal gait, normal affect and mood      Vtials      Vitals:             Height 5 ft 10 in / 177.8 cm           Weight 247 lbs 2 oz / 112.996165 kg           BSA 2.28 m2           BMI 35.5 kg/m2           Temperature 98.3 F / 36.83 C - Oral           Pulse 64           Respirations 12           Blood Pressure 149/70 Sitting, Left Arm           Pulse Oximetry 95%, room air            REVIEW      Results Reviewed      PCCS Results Reviewed?:  Yes Prev Lab Results, Yes Prev Radiology Results, Yes     Previous [facility] Records            Assessment      Notes      New Medications      * Losartan/Hydrochlorothiazide (Losartan/Hctz 50/12.5 Mg*) 1 EACH TABLET: 1 TAB       PO QDAY #30      ASSESSMENT/PLAN:       1.  Sleep apnea.  Discussed with the patient at this time that if he actually     lost 30-40 pounds, he might not have sleep apnea any more.  I instructed if he     did not want to use his mask he needs to lay on his side or his stomach and to     work on weight loss.      2.  We will see patient back on an as needed basis.      3. I have personally reviewed laboratory data, imaging as well as previous     medical records.            Patient Education      Education resources provided:  Yes      Patient Education Provided:  Sleep Apnea                 Disclaimer: Converted document may not contain table formatting or lab diagrams. Please see [employer] for the authenticated document.

## 2021-05-28 NOTE — PROGRESS NOTES
Patient: YOU SILVA     Acct: [id number]     Report: #[id number]  UNIT #: [id number]     : 1950    Encounter Date:2018  PRIMARY CARE: KANDACE AMGANA  ***Signed***  --------------------------------------------------------------------------------------------------------------------  Chief Complaint      Encounter Date      2018            Referring Provider      SELF,REFERRED PATIENT            Patient Complaint      Patient is complaining of      Pt here for 1 year f/u            VITALS      Height 5 ft 9 in / 175.26 cm      Weight 240 lbs 0 oz / 108.472904 kg      BSA 2.34 m2      BMI 35.4 kg/m2      Temperature 98.6 F / 37 C - Oral      Pulse 51      Respirations 16      Blood Pressure 144/80 Sitting, Left Arm      Pulse Oximetry 98%, Room air            HPI      The patient is a very pleasant 68 year old white male with history of     obstructive sleep apnea here today for follow up.             The patient had lost 30 pounds and stopped using his pap therapy because he did     not feel he needed it. The patient has since then gained approximately 40     pounds back but however has not started using the CPAP machine again. He does     complain of some fatigue but wonders if it could be from the machine.            ROS      Constitutional:  Denies: Fatigue, Fever, Weight gain, Weight loss, Chills,     Insomnia, Other      Respiratory/Breathing:  Complains of: Cough, Denies: Shortness of air, Wheezing    , Hemoptysis, Pleuritic pain, Other      Endocrine:  Denies: Polydipsia, Polyuria, Heat/cold intolerance, Diabetes, Other      Eyes:  Denies: Blurred vision, Vision Changes, Other      Ears, nose, mouth, throat:  Denies: Mouth lesions, Thrush, Throat pain,     Hoarseness, Allergies/Hay Fever, Post Nasal Drip, Headaches, Recent Head Injury    , Nose Bleeding, Neck Stiffness, Thyroid Mass, Hearing Loss, Ear Fullness, Dry     Mouth, Nasal or Sinus Pain, Dry Lips, Nasal discharge,  Nasal congestion, Other      Cardiovascular:  Denies: Palpitations, Syncope, Claudication, Chest Pain, Wake     up Gasping for air, Leg Swelling, Irregular Heart Rate, Cyanosis, Dyspnea on     Exertion, Other      Gastrointestinal:  Denies: Nausea, Constipation, Diarrhea, Abdominal pain,     Vomiting, Difficulty Swallowing, Reflux/Heartburn, Dysphagia, Jaundice, Bloating    , Melena, Bloody stools, Other      Genitourinary:  Denies: Urinary frequency, Incontinence, Hematuria, Urgency,     Nocturia, Dysuria, Testicular problems, Other      Musculoskeletal:  Denies: Joint Pain, Joint Stiffness, Joint Swelling, Myalgias    , Other      Hematologic/lymphatic:  DENIES: Lymphadenopathy, Bruising, Bleeding tendencies,     Other      Neurological:  Denies: Headache, Numbness, Weakness, Seizures, Other      Psychiatric:  Denies: Anxiety, Appropriate Effect, Depression, Other      Sleep:  No: Excessive daytime sleep, Morning Headache?, Snoring, Insomnia?,     Stop breathing at sleep?, Other      Integumentary:  Denies: Rash, Dry skin, Skin Warm to Touch, Other      Immunologic/Allergic:  Denies: Latex allergy, Seasonal allergies, Asthma,     Urticaria, Eczema, Other      Immunization status:  No: Up to date            FAMILY/SOCIAL/MEDICAL HX      Surgical History:  Yes: Bowel Surgery (COLONOSCOPIES X3), Cholecystectomy (LAP)    , Head Surgery (EAR TUBES X3), Oral Surgery (TONSILLECTOMY), Other Surgeries (    melanoma removal from upper right arm)      Heart - Family Hx:  Mother, Father      Diabetes - Family Hx:  Brother      Cancer/Type - Family Hx:  Uncle      Other Family Medical History:  Father (TB)      Is Father Still Living?:  No      Is Mother Still Living?:  No      Social History:  Alcohol Use (1-2 DRINKS A WEEK)      Smoking status:  Former smoker (1/2  -1 PPD QUIT IN 1975 / CHEWING TOBACCO QUIT     IN 1995)      Occupation:  RETIRED [employer]/      Anticoagulation Therapy:  No      Antibiotic  Prophylaxis:  No      Medical History:  Yes: Arthritis, Chemotherapy/Cancer (H/O MELANOMA RT ARM ),     Chronic Bronchitis/COPD (NO INHALERS), Deafness or Ringing Ears (BILATERAL),     Diabetes (BORDERLINE DIABETIC), Hemorrhoids/Rectal Prob (ACID REFLUX,  H/O     COLON POLYPS, ), High Blood Pressure (ON MEDS TO CONTROL), Shortness Of Breath (    PULMONARY NODULES, FOLLOWS W/DR. CONROY), No: Blood Disease, Seizures, Heart     Attack (DENIES CHEST PAIN AT THIS TIME HAS HX OF , ), Sinus Trouble,     Miscellaneous Medical/oth            Hx Influenza Vaccination:  Yes      Date Influenza Vaccine Given:  Oct 1, 2017      Influenza Vaccine Declined:  No      2 or More Falls Past Year?:  No      Fall Past Year with Injury?:  No      Hx Pneumococcal Vaccination:  Yes      Encouraged to follow-up with:  PCP regarding preventative exams.      Chart initiated by      Dede Santana MA            ALLERGIES/MEDICATIONS      Allergies:        Coded Allergies:             NO KNOWN ALLERGIES (Unverified , 2/9/18)      Medications    Last Reconciled on 2/9/18 14:08 by KB CONROY MD      Valsartan/HCTZ (Valsartan /12.5 MG) 1 Each Tablet      1 TAB PO QDAY, TAB         Reported         9/6/17       Meloxicam (Meloxicam*) 15 Mg Tablet      15 MG PO QDAY, #30 TAB 0 Refills         Reported         9/6/17       Nitroglycerin (Nitroglycerin) 0.4 Mg Tablet      0.4 MG SL ASDIR, TAB         Reported         4/6/15       Ervin-Fluticasone (Fluticasone 50 mcg) 16 Gm Naspr      2 PUFFS NARE EACH HS Y for CONGESTION, #1 BOTTLE 0 Refills         Reported         4/6/15       Allopurinol (Allopurinol) 100 Mg Tablet      100 MG PO HS, #30 TAB 0 Refills         Reported         4/6/15       Levothyroxine (Levothyroxine) 0.125 Mg Tablet      0.125 MG PO QDAY@07, #30 TAB 0 Refills         Reported         4/6/15       Montelukast Sodium (Singulair*) 10 Mg Tablet      10 MG PO HS, #30 TAB 0 Refills         Prov: Kb Conroy         4/3/15        Ascorbic Acid (Vitamin C*) 500 Mg Tablet      500 MG PO QDAY, TAB         Reported         7/17/14       Aspirin EC (Aspirin EC*) 81 Mg Tabec      81 TAB PO 3XWEEK         Reported         7/27/11       Zolpidem Tartrate (Ambien*) 10 Mg Tab      5 MG PO HS Y for INSOMNIA, TAB         Reported         7/25/11       Pantoprazole (Protonix*) 40 Mg Tablet      40 MG PO QDAY, TAB         Reported         7/25/11       Pravastatin Sodium (Pravachol*) 80 Mg Tablet      80 MG PO HS         Reported         7/25/11      Current Medications      Current Medications Reviewed 2/9/18            EXAM      GEN-patient appears stated age resting comfortable in no acute distress      Eyes-PERRL,  conjunctiva are normal in appearance extraocular muscles are intact    , no scleral icterus      Nasal-both nares are patent turbinates appear normal no polyps seen no nasal     discharge or ulcerations      Mouth normal dentition, no erythema no ulcerations oropharynx appears normal no     exudate no evidence of postnasal drip, MP2      Neck-there are no palpable supraclavicular or cervical adenopathy, thyroid is     normal in appearance no apparent nodules, there is no inspiratory or expiratory     stridor      Respiratory-patient exhibits normal work of breathing, speaking in full     sentences without difficulty, the chest is normal in appearance, clear to     auscultation with no wheezes rales or rhonchi, chest is normal to percussion on     both the right and left sides      Cardiovascular-the heart rate is normal and regular S1 and S2 present with no     murmur or extra heart sounds, there is no JVD or pedal edema present      GI-the abdomen is normal in appearance, bowel sounds present and normal in all     quadrants no hepatosplenomegaly or masses felt      Extremities-no clubbing is present, pulses present in all extremities,     capillary refill time is normal      Skin-skin is normal in appearance it is warm and dry, there  rashes present on     the face, no evidence of cyanosis      Neurological-the patient is alert and oriented to time place and person, moves     all 4 extremities, normal gait, normal affect and mood      Vtials      Vitals:             Height 5 ft 9 in / 175.26 cm           Weight 240 lbs 0 oz / 108.169701 kg           BSA 2.34 m2           BMI 35.4 kg/m2           Temperature 98.6 F / 37 C - Oral           Pulse 51           Respirations 16           Blood Pressure 144/80 Sitting, Left Arm           Pulse Oximetry 98%, Room air            REVIEW      Results Reviewed      PCCS Results Reviewed?:  Yes Prev Lab Results, Yes Prev Radiology Results, Yes     Previous [facility] Records            PLAN      Assessment      Notes      Discontinued Medications      * Acetaminophen/Hydrocodone 7.5/325 1 TAB TABLET: 1-2 TAB PO Q4-6H PRN PAIN #60      * Promethazine Hcl (Phenergan*) 12.5 MG TABLET: 6.25-12.5 MG PO Q6H PRN NAUSEA     AND/OR VOMITING #14      ASSESSMENT:      1. Obstructive sleep apnea.             2. Allergic rhinitis.             3obesity            PLAN:       1. encouraged to start using his pap therapy he has stopped using it since his     shoulder surgery      encouraged diet and exercise and weight loss            2. I have personally reviewed laboratory data, imaging as well as previous     medical records.            Patient Education      Education resources provided:  Yes      Patient Education Provided:  Sleep Apnea                 Disclaimer: Converted document may not contain table formatting or lab diagrams. Please see [employer] for the authenticated document.

## 2021-07-30 ENCOUNTER — CLINICAL SUPPORT (OUTPATIENT)
Dept: GASTROENTEROLOGY | Facility: CLINIC | Age: 71
End: 2021-07-30

## 2021-07-30 ENCOUNTER — PREP FOR SURGERY (OUTPATIENT)
Dept: OTHER | Facility: HOSPITAL | Age: 71
End: 2021-07-30

## 2021-07-30 DIAGNOSIS — K21.9 GASTROESOPHAGEAL REFLUX DISEASE, UNSPECIFIED WHETHER ESOPHAGITIS PRESENT: Primary | ICD-10-CM

## 2021-07-30 RX ORDER — LORATADINE 10 MG/1
TABLET ORAL
COMMUNITY
End: 2021-09-17

## 2021-07-30 RX ORDER — ASPIRIN 81 MG/1
81 TABLET ORAL NIGHTLY
COMMUNITY
End: 2022-06-16 | Stop reason: SDUPTHER

## 2021-07-30 RX ORDER — LEVOTHYROXINE SODIUM 0.12 MG/1
TABLET ORAL
COMMUNITY
End: 2021-09-17

## 2021-07-30 RX ORDER — ATORVASTATIN CALCIUM 40 MG/1
40 TABLET, FILM COATED ORAL DAILY
COMMUNITY
End: 2022-06-16 | Stop reason: SDUPTHER

## 2021-07-30 RX ORDER — LOSARTAN POTASSIUM 50 MG/1
TABLET ORAL
COMMUNITY
Start: 2021-03-29 | End: 2022-04-06 | Stop reason: SDUPTHER

## 2021-07-30 RX ORDER — PRAVASTATIN SODIUM 80 MG/1
TABLET ORAL
COMMUNITY
End: 2021-09-17

## 2021-07-30 RX ORDER — ALLOPURINOL 100 MG/1
100 TABLET ORAL NIGHTLY
COMMUNITY

## 2021-07-30 RX ORDER — TRAZODONE HYDROCHLORIDE 50 MG/1
50 TABLET ORAL NIGHTLY
COMMUNITY

## 2021-07-30 RX ORDER — FLUTICASONE PROPIONATE 50 MCG
1 SPRAY, SUSPENSION (ML) NASAL NIGHTLY
COMMUNITY

## 2021-07-30 RX ORDER — MONTELUKAST SODIUM 10 MG/1
10 TABLET ORAL NIGHTLY
COMMUNITY

## 2021-07-30 RX ORDER — OXYMETAZOLINE HCL 0.05 %
2 SPRAY, NON-AEROSOL (ML) NASAL WEEKLY
COMMUNITY
End: 2023-02-01

## 2021-07-30 RX ORDER — PANTOPRAZOLE SODIUM 40 MG/1
40 TABLET, DELAYED RELEASE ORAL DAILY
COMMUNITY

## 2021-07-30 RX ORDER — FEXOFENADINE HCL 180 MG/1
180 TABLET ORAL DAILY
COMMUNITY

## 2021-07-30 NOTE — PROGRESS NOTES
SPOKE WITH PT ON A DATE FOR EGD OF 09/30/2021. MADE SURE CHART WAS UP TO DATE MEDS, HISTORY, ALLERGIES, INSURANCE, PCP and CONTACTS WENT OVER  AND MAILED OUT INSTRUCTIONS. PUT IN ORDER FOR EGD AND SENT IN PREP TO PHARMACY.

## 2021-09-17 RX ORDER — LEVOTHYROXINE SODIUM 137 UG/1
137 TABLET ORAL DAILY
COMMUNITY

## 2021-09-17 RX ORDER — SPIRONOLACTONE AND HYDROCHLOROTHIAZIDE 25; 25 MG/1; MG/1
TABLET ORAL
COMMUNITY
End: 2022-06-16 | Stop reason: SDUPTHER

## 2021-09-17 NOTE — PRE-PROCEDURE INSTRUCTIONS
Pt. instrcued on laxative and skin prep, pre-op meds, clear liquid diet. Ok to take all meds except Losartan, Spironolactone/HCTZ a.m. of procedure.       Fully vaccinated

## 2021-09-20 ENCOUNTER — HOSPITAL ENCOUNTER (OUTPATIENT)
Facility: HOSPITAL | Age: 71
Setting detail: HOSPITAL OUTPATIENT SURGERY
Discharge: HOME | End: 2021-09-20
Attending: INTERNAL MEDICINE | Admitting: INTERNAL MEDICINE

## 2021-09-20 ENCOUNTER — ANESTHESIA (OUTPATIENT)
Dept: GASTROENTEROLOGY | Facility: HOSPITAL | Age: 71
End: 2021-09-20

## 2021-09-20 ENCOUNTER — ANESTHESIA EVENT (OUTPATIENT)
Dept: GASTROENTEROLOGY | Facility: HOSPITAL | Age: 71
End: 2021-09-20

## 2021-09-20 VITALS
HEIGHT: 69 IN | SYSTOLIC BLOOD PRESSURE: 118 MMHG | WEIGHT: 235.89 LBS | HEART RATE: 51 BPM | BODY MASS INDEX: 34.94 KG/M2 | RESPIRATION RATE: 16 BRPM | TEMPERATURE: 97.7 F | OXYGEN SATURATION: 95 % | DIASTOLIC BLOOD PRESSURE: 65 MMHG

## 2021-09-20 DIAGNOSIS — K21.9 GASTROESOPHAGEAL REFLUX DISEASE, UNSPECIFIED WHETHER ESOPHAGITIS PRESENT: ICD-10-CM

## 2021-09-20 PROCEDURE — 25010000002 PROPOFOL 10 MG/ML EMULSION: Performed by: NURSE ANESTHETIST, CERTIFIED REGISTERED

## 2021-09-20 PROCEDURE — 43239 EGD BIOPSY SINGLE/MULTIPLE: CPT | Performed by: INTERNAL MEDICINE

## 2021-09-20 PROCEDURE — 88305 TISSUE EXAM BY PATHOLOGIST: CPT | Performed by: INTERNAL MEDICINE

## 2021-09-20 RX ORDER — LIDOCAINE HYDROCHLORIDE 20 MG/ML
INJECTION, SOLUTION INFILTRATION; PERINEURAL AS NEEDED
Status: DISCONTINUED | OUTPATIENT
Start: 2021-09-20 | End: 2021-09-20 | Stop reason: SURG

## 2021-09-20 RX ORDER — SODIUM CHLORIDE, SODIUM LACTATE, POTASSIUM CHLORIDE, CALCIUM CHLORIDE 600; 310; 30; 20 MG/100ML; MG/100ML; MG/100ML; MG/100ML
30 INJECTION, SOLUTION INTRAVENOUS CONTINUOUS
Status: DISCONTINUED | OUTPATIENT
Start: 2021-09-20 | End: 2021-09-20 | Stop reason: HOSPADM

## 2021-09-20 RX ORDER — PROPOFOL 10 MG/ML
VIAL (ML) INTRAVENOUS AS NEEDED
Status: DISCONTINUED | OUTPATIENT
Start: 2021-09-20 | End: 2021-09-20 | Stop reason: SURG

## 2021-09-20 RX ADMIN — SODIUM CHLORIDE, POTASSIUM CHLORIDE, SODIUM LACTATE AND CALCIUM CHLORIDE 30 ML/HR: 600; 310; 30; 20 INJECTION, SOLUTION INTRAVENOUS at 11:31

## 2021-09-20 RX ADMIN — LIDOCAINE HYDROCHLORIDE 100 MG: 20 INJECTION, SOLUTION INFILTRATION; PERINEURAL at 12:29

## 2021-09-20 RX ADMIN — PROPOFOL 150 MG: 10 INJECTION, EMULSION INTRAVENOUS at 12:29

## 2021-09-20 RX ADMIN — PROPOFOL 200 MCG/KG/MIN: 10 INJECTION, EMULSION INTRAVENOUS at 12:30

## 2021-09-20 NOTE — H&P
Pre Procedure History & Physical    Chief Complaint:   gerd     Subjective     HPI:   gerd    Past Medical History:   Past Medical History:   Diagnosis Date   • Arthritis    • COPD (chronic obstructive pulmonary disease) (CMS/HCC)    • ETD (Eustachian tube dysfunction), bilateral    • GERD (gastroesophageal reflux disease)    • Gout    • H/O arthroscopy of shoulder 09/28/2017    aftercare following, right    • Hearing loss    • History of arthroscopy of right shoulder 01/11/2018   • HTN (hypertension)    • Hyperlipemia    • Hypertension, essential    • Hypothyroidism    • Limb swelling    • Lipoma    • Low back pain    • Melanoma (CMS/HCC)    • Nasal septal ulcer    • Otalgia    • Personal history of colonic polyps 07/14/2014   • Recurrent epistaxis    • Recurrent otitis media    • Rotator cuff tear arthropathy of right shoulder 08/01/2017   • Seasonal allergies    • Sleep apnea    • Thyroid disorder    • Tinnitus, bilateral        Past Surgical History:  Past Surgical History:   Procedure Laterality Date   • CHOLECYSTECTOMY     • COLONOSCOPY     • ENDOSCOPY     • INTRAOCULAR LENS INSERTION      eye implant, yes    • LIPOMA EXCISION  02/26/2019    removal    • MYRINGOTOMY W/ TUBES      states had had several pairs placed by Dr. Kaba    • OTHER SURGICAL HISTORY Left 2013    melanoma excision from shoulder    • ROTATOR CUFF REPAIR     • TONSILLECTOMY     • UPPER GASTROINTESTINAL ENDOSCOPY         Family History:  Family History   Problem Relation Age of Onset   • Colon cancer Other    • Heart disease Father    • Heart attack Father 57        MI   • Diabetes Brother    • Colon cancer Daughter 41   • Malig Hyperthermia Neg Hx        Social History:   reports that he has quit smoking. He quit smokeless tobacco use about 15 years ago.  His smokeless tobacco use included chew. He reports previous alcohol use. He reports that he does not use drugs.    Medications:   Medications Prior to Admission   Medication Sig Dispense  Refill Last Dose   • allopurinol (ZYLOPRIM) 100 MG tablet allopurinol 100 mg oral tablet take 1 tablet (100 mg) by oral route once daily   Suspended   9/19/2021 at Unknown time   • ascorbic acid (VITAMIN C) 500 MG capsule controlled-release CR capsule Vitamin C 500 mg oral capsule, extended release take 1 capsule by oral route daily   Active   9/19/2021 at Unknown time   • aspirin (aspirin) 81 MG EC tablet Take 81 mg by mouth Every Night.   Past Week at Unknown time   • atorvastatin (LIPITOR) 40 MG tablet Take 40 mg by mouth Daily.   9/19/2021 at Unknown time   • fexofenadine (ALLEGRA) 180 MG tablet Allergy Relief (fexofenadine) 180 mg oral tablet take 1 tablet (180 mg) by oral route once daily   Active   9/19/2021 at Unknown time   • fluticasone (FLONASE) 50 MCG/ACT nasal spray fluticasone 50 mcg/actuation nasal spray,suspension inhale 1 spray (50 mcg) in each nostril by intranasal route once daily   Active   9/19/2021 at Unknown time   • levothyroxine (SYNTHROID, LEVOTHROID) 137 MCG tablet Take 137 mcg by mouth Daily.   9/20/2021 at Unknown time   • losartan (COZAAR) 50 MG tablet losartan 50 mg oral tablet take 1 tablet (50 mg) by oral route 2 times per day 3/29/2021  Active   9/19/2021 at Unknown time   • montelukast (SINGULAIR) 10 MG tablet montelukast 10 mg oral tablet take 1 tablet (10 mg) by oral route once daily in the evening   Active   9/19/2021 at Unknown time   • oxymetazoline (Afrin Nasal Spray) 0.05 % nasal spray 2 sprays into the nostril(s) as directed by provider 1 (One) Time Per Week.   9/19/2021 at Unknown time   • pantoprazole (PROTONIX) 40 MG EC tablet pantoprazole 40 mg oral tablet,delayed release (DR/EC) take 1 tablet (40 mg) by oral route once daily   Active   9/20/2021 at Unknown time   • spironolactone-hydrochlorothiazide (ALDACTAZIDE) 25-25 MG tablet Take 1 tablet by mouth Every Other Day.   9/19/2021 at Unknown time   • traZODone (DESYREL) 50 MG tablet Take 50 mg by mouth Every Night. 1-2  "TABS   9/19/2021 at Unknown time       Allergies:  Patient has no known allergies.        Objective     Blood pressure 151/74, pulse 52, temperature 97.4 °F (36.3 °C), temperature source Temporal, resp. rate 18, height 175.3 cm (69.02\"), weight 107 kg (235 lb 14.3 oz), SpO2 97 %.    Physical Exam   Constitutional: Pt is oriented to person, place, and time and well-developed, well-nourished, and in no distress.   Mouth/Throat: Oropharynx is clear and moist.   Neck: Normal range of motion.   Cardiovascular: Normal rate, regular rhythm and normal heart sounds.    Pulmonary/Chest: Effort normal and breath sounds normal.   Abdominal: Soft. Nontender  Skin: Skin is warm and dry.   Psychiatric: Mood, memory, affect and judgment normal.     Assessment/Plan     Diagnosis:  gerd    Anticipated Surgical Procedure:  egd    The risks, benefits, and alternatives of this procedure have been discussed with the patient or the responsible party- the patient understands and agrees to proceed.            "

## 2021-09-20 NOTE — ANESTHESIA POSTPROCEDURE EVALUATION
Patient: Percy Velarde    Procedure Summary     Date: 09/20/21 Room / Location: [facility] ENDOSCOPY 4 / [facility] ENDOSCOPY    Anesthesia Start: 1208 Anesthesia Stop: 1239    Procedure: ESOPHAGOGASTRODUODENOSCOPY with biopsy (N/A ) Diagnosis:       Gastroesophageal reflux disease, unspecified whether esophagitis present      (Gastroesophageal reflux disease, unspecified whether esophagitis present [K21.9])    Surgeons: Mich Lane MD Provider: Lauro Reddy MD    Anesthesia Type: general ASA Status: 3          Anesthesia Type: general    Vitals  No vitals data found for the desired time range.          Post Anesthesia Care and Evaluation    Patient location during evaluation: bedside  Patient participation: complete - patient participated  Level of consciousness: awake  Pain score: 0  Pain management: adequate  Airway patency: patent  Anesthetic complications: No anesthetic complications  PONV Status: none  Cardiovascular status: acceptable and stable  Respiratory status: acceptable and room air  Hydration status: acceptable    Comments: An Anesthesiologist personally participated in the most demanding procedures (including induction and emergence if applicable) in the anesthesia plan, monitored the course of anesthesia administration at frequent intervals and remained physically present and available for immediate diagnosis and treatment of emergencies.

## 2021-09-20 NOTE — ANESTHESIA PREPROCEDURE EVALUATION
Anesthesia Evaluation     Patient summary reviewed and Nursing notes reviewed   no history of anesthetic complications:  NPO Solid Status: > 4 hours  NPO Liquid Status: > 2 hours           Airway   Mallampati: II  TM distance: >3 FB  Neck ROM: full  No difficulty expected  Dental      Pulmonary - normal exam    breath sounds clear to auscultation  (+) COPD, sleep apnea,   Cardiovascular - normal exam  Exercise tolerance: good (4-7 METS)    Rhythm: regular    (+) hypertension, hyperlipidemia,       Neuro/Psych- negative ROS  GI/Hepatic/Renal/Endo    (+)  GERD,      Musculoskeletal     Abdominal    Substance History - negative use     OB/GYN negative ob/gyn ROS         Other   arthritis,    history of cancer                  Anesthesia Plan    ASA 3     general   (Total IV Anesthesia    Patient understands anesthesia not responsible for dental damage.  )  intravenous induction     Anesthetic plan, all risks, benefits, and alternatives have been provided, discussed and informed consent has been obtained with: patient.    Plan discussed with CRNA.

## 2021-09-22 LAB
CYTO UR: NORMAL
LAB AP CASE REPORT: NORMAL
LAB AP CLINICAL INFORMATION: NORMAL
PATH REPORT.FINAL DX SPEC: NORMAL
PATH REPORT.GROSS SPEC: NORMAL

## 2021-11-09 ENCOUNTER — OFFICE VISIT (OUTPATIENT)
Dept: ORTHOPEDIC SURGERY | Facility: CLINIC | Age: 71
End: 2021-11-09

## 2021-11-09 VITALS — HEIGHT: 69 IN | BODY MASS INDEX: 35.99 KG/M2 | HEART RATE: 57 BPM | OXYGEN SATURATION: 96 % | WEIGHT: 243 LBS

## 2021-11-09 DIAGNOSIS — M17.0 BILATERAL PRIMARY OSTEOARTHRITIS OF KNEE: Primary | ICD-10-CM

## 2021-11-09 DIAGNOSIS — M25.562 PAIN IN BOTH KNEES, UNSPECIFIED CHRONICITY: ICD-10-CM

## 2021-11-09 DIAGNOSIS — M25.561 PAIN IN BOTH KNEES, UNSPECIFIED CHRONICITY: ICD-10-CM

## 2021-11-09 PROCEDURE — 99213 OFFICE O/P EST LOW 20 MIN: CPT | Performed by: ORTHOPAEDIC SURGERY

## 2021-11-09 RX ORDER — MELOXICAM 15 MG/1
15 TABLET ORAL DAILY
Qty: 30 TABLET | Refills: 1 | Status: SHIPPED | OUTPATIENT
Start: 2021-11-09 | End: 2022-01-14

## 2021-11-09 NOTE — PROGRESS NOTES
"Chief Complaint  Initial Evaluation of the Left Knee and Initial Evaluation of the Right Knee     Subjective      Percy Velarde presents to [facility] for bilateral knee pain. Patient has been having persistent knee pain for 9 months - 1 year. He denies any injury or trauma to bilateral knees. He states both knees hurt equally. Patient reports that it feels like his knees are giving way on him. Patient has noticed while taking an antibiotic and steroid for his lungs, his knees weren't hurting him.     No Known Allergies     Social History     Socioeconomic History   • Marital status:    Tobacco Use   • Smoking status: Former Smoker   • Smokeless tobacco: Former User     Types: Chew     Quit date: 2006   • Tobacco comment: QUIT 1975   Vaping Use   • Vaping Use: Never used   Substance and Sexual Activity   • Alcohol use: Not Currently     Comment: drinks weekly   • Drug use: Never   • Sexual activity: Defer        Review of Systems     Objective   Vital Signs:   Pulse 57   Ht 175.3 cm (69\")   Wt 110 kg (243 lb)   SpO2 96%   BMI 35.88 kg/m²       Physical Exam  Constitutional:       Appearance: Normal appearance. Patient is well-developed and normal weight.   HENT:      Head: Normocephalic.      Right Ear: Hearing and external ear normal.      Left Ear: Hearing and external ear normal.      Nose: Nose normal.   Eyes:      Conjunctiva/sclera: Conjunctivae normal.   Cardiovascular:      Rate and Rhythm: Normal rate.   Pulmonary:      Effort: Pulmonary effort is normal.      Breath sounds: No wheezing or rales.   Abdominal:      Palpations: Abdomen is soft.      Tenderness: There is no abdominal tenderness.   Musculoskeletal:      Cervical back: Normal range of motion.   Skin:     Findings: No rash.   Neurological:      Mental Status: Patient is alert and oriented to person, place, and time.   Psychiatric:         Mood and Affect: Mood and affect normal.         Judgment: Judgment " normal.       Ortho Exam      RIGHT KNEE: Good strength to hamstrings, quadriceps, dorsiflexors and plantar flexors. Stable to varus/valgus stress. Negative Lachman. Dorsal Pedal Pulse 2+, posterior tibialis pulse 2+. Calf supple, non-tender, no signs of DVT. Sensation grossly intact. Neurovascular intact. No swelling, skin discoloration or atrophy. Full extension and flexion. Tender medial and lateral joint line.     LEFT KNEE: No swelling, skin discoloration or atrophy. Sensation grossly intact. Neurovascular intact.  Good strength to hamstrings, quadriceps, dorsiflexors and plantar flexors. Stable to varus/valgus stress. Negative Lachman. Full flexion and extension. Dorsal Pedal Pulse 2+, posterior tibialis pulse 2+. Calf supple, non-tender, no signs of DVT. Tender medial and lateral joint line.       Procedures      Imaging Results (Most Recent)     Procedure Component Value Units Date/Time    XR Knee 3 View Bilateral [[id number]] Resulted: 11/09/21 1044     Updated: 11/09/21 1044    Narrative:      X-Ray Report:  Bilateral knee(s) X-Ray  Indication: Evaluation of bilateral knees   AP, Lateral and Standing view(s)  Findings: Joint space narrowing of the medial and lateral compartments of   bilateral knees. No fracture or dislocation noted.   Prior studies available for comparison: no            Result Review :     X-Ray Report:  Bilateral knee(s) X-Ray  Indication: Evaluation of bilateral knees   AP, Lateral and Standing view(s)  Findings: Joint space narrowing of the medial and lateral compartments of bilateral knees. No fracture or dislocation noted.   Prior studies available for comparison: no       Assessment and Plan     DX: Bilateral knee osteoarthritis     Steroid vs gel injections discussed. Patient prescribed NSAIDs. Steroid pack is currently giving patient relief. If he wants a injection in the future, he will call our office and have this set up.     Call or return if worsening symptoms.    Follow Up      PRN.       Patient was given instructions and counseling regarding his condition or for health maintenance advice. Please see specific information pulled into the AVS if appropriate.     Scribed for Bao Vigil MD by Amanda Humphrey.  11/09/21   08:15 EST        I have personally performed the services described in this document as scribed by the above individual and it is both accurate and complete. Bao Vigil MD 11/10/21

## 2021-11-18 ENCOUNTER — LAB (OUTPATIENT)
Dept: LAB | Facility: HOSPITAL | Age: 71
End: 2021-11-18

## 2021-11-18 ENCOUNTER — TRANSCRIBE ORDERS (OUTPATIENT)
Dept: LAB | Facility: HOSPITAL | Age: 71
End: 2021-11-18

## 2021-11-18 DIAGNOSIS — Z79.899 ENCOUNTER FOR LONG-TERM (CURRENT) USE OF OTHER MEDICATIONS: ICD-10-CM

## 2021-11-18 DIAGNOSIS — E03.9 HYPOTHYROIDISM, UNSPECIFIED TYPE: ICD-10-CM

## 2021-11-18 DIAGNOSIS — E78.5 HYPERLIPIDEMIA, UNSPECIFIED HYPERLIPIDEMIA TYPE: Primary | ICD-10-CM

## 2021-11-18 DIAGNOSIS — E78.5 HYPERLIPIDEMIA, UNSPECIFIED HYPERLIPIDEMIA TYPE: ICD-10-CM

## 2021-11-18 LAB
ALBUMIN SERPL-MCNC: 3.9 G/DL (ref 3.5–5.2)
ALBUMIN/GLOB SERPL: 1.4 G/DL
ALP SERPL-CCNC: 83 U/L (ref 39–117)
ALT SERPL W P-5'-P-CCNC: 31 U/L (ref 1–41)
ANION GAP SERPL CALCULATED.3IONS-SCNC: 7.5 MMOL/L (ref 5–15)
AST SERPL-CCNC: 37 U/L (ref 1–40)
BILIRUB SERPL-MCNC: 0.5 MG/DL (ref 0–1.2)
BUN SERPL-MCNC: 13 MG/DL (ref 8–23)
BUN/CREAT SERPL: 9.8 (ref 7–25)
CALCIUM SPEC-SCNC: 9.2 MG/DL (ref 8.6–10.5)
CHLORIDE SERPL-SCNC: 107 MMOL/L (ref 98–107)
CHOLEST SERPL-MCNC: 105 MG/DL (ref 0–200)
CO2 SERPL-SCNC: 24.5 MMOL/L (ref 22–29)
CREAT SERPL-MCNC: 1.33 MG/DL (ref 0.76–1.27)
GFR SERPL CREATININE-BSD FRML MDRD: 53 ML/MIN/1.73
GLOBULIN UR ELPH-MCNC: 2.7 GM/DL
GLUCOSE SERPL-MCNC: 96 MG/DL (ref 65–99)
HDLC SERPL-MCNC: 35 MG/DL (ref 40–60)
LDLC SERPL CALC-MCNC: 44 MG/DL (ref 0–100)
LDLC/HDLC SERPL: 1.14 {RATIO}
POTASSIUM SERPL-SCNC: 4.3 MMOL/L (ref 3.5–5.2)
PROT SERPL-MCNC: 6.6 G/DL (ref 6–8.5)
SODIUM SERPL-SCNC: 139 MMOL/L (ref 136–145)
T4 FREE SERPL-MCNC: 1.08 NG/DL (ref 0.93–1.7)
TRIGL SERPL-MCNC: 151 MG/DL (ref 0–150)
TSH SERPL DL<=0.05 MIU/L-ACNC: 6.74 UIU/ML (ref 0.27–4.2)
VLDLC SERPL-MCNC: 26 MG/DL (ref 5–40)

## 2021-11-18 PROCEDURE — 84439 ASSAY OF FREE THYROXINE: CPT

## 2021-11-18 PROCEDURE — 84443 ASSAY THYROID STIM HORMONE: CPT

## 2021-11-18 PROCEDURE — 80053 COMPREHEN METABOLIC PANEL: CPT

## 2021-11-18 PROCEDURE — 36415 COLL VENOUS BLD VENIPUNCTURE: CPT

## 2021-11-18 PROCEDURE — 80061 LIPID PANEL: CPT

## 2021-11-24 ENCOUNTER — OFFICE VISIT (OUTPATIENT)
Dept: CARDIOLOGY | Facility: CLINIC | Age: 71
End: 2021-11-24

## 2021-11-24 VITALS
SYSTOLIC BLOOD PRESSURE: 130 MMHG | HEART RATE: 52 BPM | BODY MASS INDEX: 36.29 KG/M2 | WEIGHT: 245 LBS | DIASTOLIC BLOOD PRESSURE: 50 MMHG | HEIGHT: 69 IN

## 2021-11-24 DIAGNOSIS — I49.5 SICK SINUS SYNDROME (HCC): ICD-10-CM

## 2021-11-24 DIAGNOSIS — E78.5 HYPERLIPIDEMIA, UNSPECIFIED HYPERLIPIDEMIA TYPE: ICD-10-CM

## 2021-11-24 DIAGNOSIS — I25.10 CORONARY ARTERY DISEASE INVOLVING NATIVE HEART WITHOUT ANGINA PECTORIS, UNSPECIFIED VESSEL OR LESION TYPE: Primary | ICD-10-CM

## 2021-11-24 DIAGNOSIS — I10 HYPERTENSION, ESSENTIAL: ICD-10-CM

## 2021-11-24 PROBLEM — E03.9 HYPOTHYROIDISM: Status: ACTIVE | Noted: 2021-11-24

## 2021-11-24 PROCEDURE — 99214 OFFICE O/P EST MOD 30 MIN: CPT | Performed by: INTERNAL MEDICINE

## 2021-11-24 NOTE — PROGRESS NOTES
Office Visit    Chief Complaint  Hypertension and Hyperlipidemia    Subjective            Percy Velarde presents to [facility] CARDIOLOG[facility]  Mr. Velarde is a 71 years old gentleman hypertension hyperlipidemia bradycardia who has occasional episodes of dizziness.  He has not had any syncope.  His dizziness is mostly in the morning hours after he wakes up.      Past Medical History:   Diagnosis Date   • Arthritis    • COPD (chronic obstructive pulmonary disease) (HCC)    • Coronary artery disease involving native heart without angina pectoris 11/24/2021   • ETD (Eustachian tube dysfunction), bilateral    • GERD (gastroesophageal reflux disease)    • Gout    • H/O arthroscopy of shoulder 09/28/2017    aftercare following, right    • Hearing loss    • History of arthroscopy of right shoulder 01/11/2018   • HTN (hypertension)    • Hyperlipemia    • Hypertension, essential    • Hypothyroidism    • Limb swelling    • Lipoma    • Low back pain    • Melanoma (HCC)    • Nasal septal ulcer    • Otalgia    • Personal history of colonic polyps 07/14/2014   • Recurrent epistaxis    • Recurrent otitis media    • Rotator cuff tear arthropathy of right shoulder 08/01/2017   • Seasonal allergies    • Sleep apnea    • Thyroid disorder    • Tinnitus, bilateral        No Known Allergies     Past Surgical History:   Procedure Laterality Date   • CHOLECYSTECTOMY     • COLONOSCOPY     • ENDOSCOPY     • ENDOSCOPY N/A 9/20/2021    Procedure: ESOPHAGOGASTRODUODENOSCOPY with biopsy;  Surgeon: Mich Lane MD;  Location: [facility] ENDOSCOPY;  Service: Gastroenterology;  Laterality: N/A;  gastric polyps   • INTRAOCULAR LENS INSERTION      eye implant, yes    • LIPOMA EXCISION  02/26/2019    removal    • MYRINGOTOMY W/ TUBES      states had had several pairs placed by Dr. Kaba    • OTHER SURGICAL HISTORY Left 2013    melanoma excision from shoulder    • ROTATOR CUFF REPAIR     • TONSILLECTOMY     • UPPER GASTROINTESTINAL  ENDOSCOPY          Social History     Tobacco Use   • Smoking status: Former Smoker   • Smokeless tobacco: Former User     Types: Chew     Quit date: 2006   • Tobacco comment: QUIT 1975   Vaping Use   • Vaping Use: Never used   Substance Use Topics   • Alcohol use: Not Currently     Comment: drinks weekly   • Drug use: Never       Family History   Problem Relation Age of Onset   • Colon cancer Other    • Heart disease Father    • Heart attack Father 57        MI   • Diabetes Brother    • Colon cancer Daughter 41   • Malig Hyperthermia Neg Hx         Prior to Admission medications    Medication Sig Start Date End Date Taking? Authorizing Provider   allopurinol (ZYLOPRIM) 100 MG tablet allopurinol 100 mg oral tablet take 1 tablet (100 mg) by oral route once daily   Suspended   Yes Provider, Historical, MD   ascorbic acid (VITAMIN C) 500 MG capsule controlled-release CR capsule Vitamin C 500 mg oral capsule, extended release take 1 capsule by oral route daily   Active   Yes Provider, Historical, MD   aspirin (aspirin) 81 MG EC tablet Take 81 mg by mouth Every Night.   Yes Provider, Historical, MD   atorvastatin (LIPITOR) 40 MG tablet Take 40 mg by mouth Daily.   Yes Provider, Historical, MD   fexofenadine (ALLEGRA) 180 MG tablet Allergy Relief (fexofenadine) 180 mg oral tablet take 1 tablet (180 mg) by oral route once daily   Active   Yes Provider, Historical, MD   fluticasone (FLONASE) 50 MCG/ACT nasal spray fluticasone 50 mcg/actuation nasal spray,suspension inhale 1 spray (50 mcg) in each nostril by intranasal route once daily   Active   Yes Provider, Historical, MD   levothyroxine (SYNTHROID, LEVOTHROID) 137 MCG tablet Take 137 mcg by mouth Daily.   Yes Provider, Historical, MD   losartan (COZAAR) 50 MG tablet losartan 50 mg oral tablet take 1 tablet (50 mg) by oral route 2 times per day 3/29/2021  Active 3/29/21  Yes Provider, Historical, MD   meloxicam (Mobic) 15 MG tablet Take 1 tablet by mouth Daily. 11/9/21   "Yes Bao Vigil MD   montelukast (SINGULAIR) 10 MG tablet montelukast 10 mg oral tablet take 1 tablet (10 mg) by oral route once daily in the evening   Active   Yes Prakash Romero MD   oxymetazoline (Afrin Nasal Spray) 0.05 % nasal spray 2 sprays into the nostril(s) as directed by provider 1 (One) Time Per Week.   Yes Prakash Romero MD   pantoprazole (PROTONIX) 40 MG EC tablet pantoprazole 40 mg oral tablet,delayed release (DR/EC) take 1 tablet (40 mg) by oral route once daily   Active   Yes Prakash Romero MD   spironolactone-hydrochlorothiazide (ALDACTAZIDE) 25-25 MG tablet Take 1 tablet by mouth Every Other Day. Take 1/2 tablet by mouth every other day ]   Yes Prakash Romero MD   traZODone (DESYREL) 50 MG tablet Take 50 mg by mouth Every Night. 1-2 TABS   Yes Prakash Romero MD        Review of Systems   Constitutional: Positive for fatigue.   Respiratory: Positive for cough and shortness of breath.    Cardiovascular: Positive for leg swelling. Negative for chest pain and palpitations.   Neurological: Positive for dizziness.        Objective     /50 (BP Location: Left arm, Patient Position: Standing, Cuff Size: Large Adult)   Pulse 52   Ht 175.3 cm (69\")   Wt 111 kg (245 lb)   BMI 36.18 kg/m²       Physical Exam  Constitutional:       General: He is awake.      Appearance: Normal appearance.   Neck:      Thyroid: No thyromegaly.      Vascular: No carotid bruit or JVD.   Cardiovascular:      Rate and Rhythm: Normal rate and regular rhythm.      Chest Wall: PMI is not displaced.      Pulses: Normal pulses.      Heart sounds: Normal heart sounds, S1 normal and S2 normal. No murmur heard.  No friction rub. No gallop. No S3 or S4 sounds.    Pulmonary:      Effort: Pulmonary effort is normal.      Breath sounds: Normal breath sounds and air entry. No wheezing, rhonchi or rales.   Abdominal:      General: Bowel sounds are normal.      Palpations: Abdomen is soft. There is " no mass.      Tenderness: There is no abdominal tenderness.   Musculoskeletal:      Cervical back: Neck supple.      Right lower leg: No edema.      Left lower leg: No edema.   Neurological:      Mental Status: He is alert and oriented to person, place, and time.   Psychiatric:         Mood and Affect: Mood normal.         Behavior: Behavior is cooperative.           Result Review :                           Assessment and Plan        Diagnoses and all orders for this visit:    1. Coronary artery disease involving native heart without angina pectoris, unspecified vessel or lesion type (Primary)  -     Lipid Panel; Future  -     Comprehensive Metabolic Panel; Future  -     Magnesium; Future    2. Hyperlipidemia, unspecified hyperlipidemia type  Assessment & Plan:  Lipid abnormalities are are at goal and have indicated to him to continue his Lipitor in the current dosage    Orders:  -     Lipid Panel; Future  -     Comprehensive Metabolic Panel; Future  -     Magnesium; Future    3. Hypertension, essential  -     Lipid Panel; Future  -     Comprehensive Metabolic Panel; Future  -     Magnesium; Future    4. Sick sinus syndrome (HCC)  Assessment & Plan:  Is sinus bradycardia and occasional dizziness is a consequence of his sick sinus syndrome.  In the past he has undergone multiple evaluations of Holter monitor and event monitor.  He has not demonstrated any very serious bradycardia arrhythmias.  I have indicated to him that we could use theophylline to speed up his atrial rate.  However he is reluctant since his symptoms are so mild.  He will let us know if his symptoms get worse            Follow Up     Return in about 6 months (around 5/24/2022) for EKG with next office visit.    Patient was given instructions and counseling regarding his condition or for health maintenance advice. Please see specific information pulled into the AVS if appropriate.     Jackie Ochoa MD  11/24/21 12:54 EST

## 2021-11-25 NOTE — ASSESSMENT & PLAN NOTE
His TSH is slightly high and he may benefit from uptitrating his thyroid dosage.  I have asked him to check with Dr. Renee

## 2021-11-25 NOTE — ASSESSMENT & PLAN NOTE
Is sinus bradycardia and occasional dizziness is a consequence of his sick sinus syndrome.  In the past he has undergone multiple evaluations of Holter monitor and event monitor.  He has not demonstrated any very serious bradycardia arrhythmias.  I have indicated to him that we could use theophylline to speed up his atrial rate.  However he is reluctant since his symptoms are so mild.  He will let us know if his symptoms get worse

## 2021-11-25 NOTE — ASSESSMENT & PLAN NOTE
Lipid abnormalities are are at goal and have indicated to him to continue his Lipitor in the current dosage

## 2022-01-14 DIAGNOSIS — M17.0 BILATERAL PRIMARY OSTEOARTHRITIS OF KNEE: ICD-10-CM

## 2022-01-14 RX ORDER — MELOXICAM 15 MG/1
TABLET ORAL
Qty: 30 TABLET | Refills: 1 | Status: SHIPPED | OUTPATIENT
Start: 2022-01-14 | End: 2022-03-02

## 2022-02-02 ENCOUNTER — TELEPHONE (OUTPATIENT)
Dept: CARDIOLOGY | Facility: CLINIC | Age: 72
End: 2022-02-02

## 2022-02-02 NOTE — TELEPHONE ENCOUNTER
Received VM from patient.    Returned call. Patient provided verbal PHI to discuss with his daughter, Kassidy.    S/W Kassidy who wanted clarification on if patient could be on meloxicam and ASA. Explained why we instruct patient's not to take ASA and meloxicam together.

## 2022-03-02 DIAGNOSIS — M17.0 BILATERAL PRIMARY OSTEOARTHRITIS OF KNEE: ICD-10-CM

## 2022-03-02 RX ORDER — MELOXICAM 15 MG/1
TABLET ORAL
Qty: 30 TABLET | Refills: 1 | Status: SHIPPED | OUTPATIENT
Start: 2022-03-02 | End: 2022-06-16 | Stop reason: SDUPTHER

## 2022-04-06 DIAGNOSIS — I10 HYPERTENSION, ESSENTIAL: Primary | ICD-10-CM

## 2022-04-06 DIAGNOSIS — I25.10 CORONARY ARTERY DISEASE INVOLVING NATIVE HEART WITHOUT ANGINA PECTORIS, UNSPECIFIED VESSEL OR LESION TYPE: ICD-10-CM

## 2022-04-06 RX ORDER — LOSARTAN POTASSIUM 50 MG/1
50 TABLET ORAL 2 TIMES DAILY
Qty: 90 TABLET | Refills: 0 | Status: SHIPPED | OUTPATIENT
Start: 2022-04-06 | End: 2022-06-16 | Stop reason: SDUPTHER

## 2022-06-09 ENCOUNTER — LAB (OUTPATIENT)
Dept: LAB | Facility: HOSPITAL | Age: 72
End: 2022-06-09

## 2022-06-09 DIAGNOSIS — I10 HYPERTENSION, ESSENTIAL: ICD-10-CM

## 2022-06-09 DIAGNOSIS — I25.10 CORONARY ARTERY DISEASE INVOLVING NATIVE HEART WITHOUT ANGINA PECTORIS, UNSPECIFIED VESSEL OR LESION TYPE: ICD-10-CM

## 2022-06-09 DIAGNOSIS — E78.5 HYPERLIPIDEMIA, UNSPECIFIED HYPERLIPIDEMIA TYPE: ICD-10-CM

## 2022-06-09 LAB
CHOLEST SERPL-MCNC: 114 MG/DL (ref 0–200)
HDLC SERPL-MCNC: 42 MG/DL (ref 40–60)
LDLC SERPL CALC-MCNC: 47 MG/DL (ref 0–100)
LDLC/HDLC SERPL: 1.04 {RATIO}
MAGNESIUM SERPL-MCNC: 1.9 MG/DL (ref 1.6–2.4)
TRIGL SERPL-MCNC: 142 MG/DL (ref 0–150)
VLDLC SERPL-MCNC: 25 MG/DL (ref 5–40)

## 2022-06-09 PROCEDURE — 80061 LIPID PANEL: CPT

## 2022-06-09 PROCEDURE — 83735 ASSAY OF MAGNESIUM: CPT

## 2022-06-09 PROCEDURE — 36415 COLL VENOUS BLD VENIPUNCTURE: CPT

## 2022-06-16 ENCOUNTER — OFFICE VISIT (OUTPATIENT)
Dept: CARDIOLOGY | Facility: CLINIC | Age: 72
End: 2022-06-16

## 2022-06-16 VITALS
HEART RATE: 58 BPM | HEIGHT: 69 IN | DIASTOLIC BLOOD PRESSURE: 65 MMHG | SYSTOLIC BLOOD PRESSURE: 143 MMHG | WEIGHT: 246 LBS | BODY MASS INDEX: 36.43 KG/M2

## 2022-06-16 DIAGNOSIS — M17.0 BILATERAL PRIMARY OSTEOARTHRITIS OF KNEE: ICD-10-CM

## 2022-06-16 DIAGNOSIS — I10 HYPERTENSION, ESSENTIAL: ICD-10-CM

## 2022-06-16 DIAGNOSIS — I25.10 CORONARY ARTERY DISEASE INVOLVING NATIVE HEART WITHOUT ANGINA PECTORIS, UNSPECIFIED VESSEL OR LESION TYPE: ICD-10-CM

## 2022-06-16 DIAGNOSIS — E78.5 HYPERLIPIDEMIA, UNSPECIFIED HYPERLIPIDEMIA TYPE: Chronic | ICD-10-CM

## 2022-06-16 DIAGNOSIS — R06.02 SOB (SHORTNESS OF BREATH) ON EXERTION: Primary | ICD-10-CM

## 2022-06-16 DIAGNOSIS — I49.5 SICK SINUS SYNDROME: ICD-10-CM

## 2022-06-16 PROCEDURE — 99214 OFFICE O/P EST MOD 30 MIN: CPT | Performed by: INTERNAL MEDICINE

## 2022-06-16 RX ORDER — SPIRONOLACTONE AND HYDROCHLOROTHIAZIDE 25; 25 MG/1; MG/1
1 TABLET ORAL EVERY OTHER DAY
Qty: 45 TABLET | Refills: 3 | Status: SHIPPED | OUTPATIENT
Start: 2022-06-16

## 2022-06-16 RX ORDER — ATORVASTATIN CALCIUM 40 MG/1
40 TABLET, FILM COATED ORAL DAILY
Qty: 90 TABLET | Refills: 3 | Status: SHIPPED | OUTPATIENT
Start: 2022-06-16

## 2022-06-16 RX ORDER — MELOXICAM 15 MG/1
7.5 TABLET ORAL EVERY OTHER DAY
Qty: 30 TABLET | Refills: 1
Start: 2022-06-16 | End: 2022-10-26

## 2022-06-16 RX ORDER — LOSARTAN POTASSIUM 50 MG/1
50 TABLET ORAL 2 TIMES DAILY
Qty: 90 TABLET | Refills: 3 | Status: SHIPPED | OUTPATIENT
Start: 2022-06-16

## 2022-06-16 RX ORDER — ASPIRIN 81 MG/1
81 TABLET ORAL EVERY OTHER DAY
Qty: 45 TABLET | Refills: 3 | Status: SHIPPED | OUTPATIENT
Start: 2022-06-16 | End: 2023-02-14 | Stop reason: HOSPADM

## 2022-06-16 NOTE — PROGRESS NOTES
Office Visit    Chief Complaint  Hyperlipidemia, Hypertension, and Coronary Artery Disease    Subjective            Percy Velarde presents to [facility] CARDIOLOGY  Percy Velarde is a 72 years old gentleman with with coronary disease hypertension hyperlipidemia, sick sinus syndrome with mild bradycardia who complains of shortness of breath and pedal edema.  His shortness of breath is mild and occurs with moderate to heavy exertion.  He has had some degree of pedal edema.  His blood pressure runs a little bit high at home.  He denies chest pain dizziness or syncope..  He has problems with arthritis      Past Medical History:   Diagnosis Date   • Arthritis    • COPD (chronic obstructive pulmonary disease) (HCC)    • Coronary artery disease involving native heart without angina pectoris 11/24/2021   • ETD (Eustachian tube dysfunction), bilateral    • GERD (gastroesophageal reflux disease)    • Gout    • H/O arthroscopy of shoulder 09/28/2017    aftercare following, right    • Hearing loss    • History of arthroscopy of right shoulder 01/11/2018   • HTN (hypertension)    • Hyperlipemia    • Hypertension, essential    • Hypothyroidism    • Limb swelling    • Lipoma    • Low back pain    • Melanoma (HCC)    • Nasal septal ulcer    • Otalgia    • Personal history of colonic polyps 07/14/2014   • Recurrent epistaxis    • Recurrent otitis media    • Rotator cuff tear arthropathy of right shoulder 08/01/2017   • Seasonal allergies    • Sleep apnea    • Thyroid disorder    • Tinnitus, bilateral        No Known Allergies     Past Surgical History:   Procedure Laterality Date   • CHOLECYSTECTOMY     • COLONOSCOPY     • ENDOSCOPY     • ENDOSCOPY N/A 9/20/2021    Procedure: ESOPHAGOGASTRODUODENOSCOPY with biopsy;  Surgeon: Mich Lane MD;  Location: [facility] ENDOSCOPY;  Service: Gastroenterology;  Laterality: N/A;  gastric polyps   • INTRAOCULAR LENS INSERTION      eye implant, yes    • LIPOMA EXCISION   02/26/2019    removal    • MYRINGOTOMY W/ TUBES      states had had several pairs placed by Dr. Kaba    • OTHER SURGICAL HISTORY Left 2013    melanoma excision from shoulder    • ROTATOR CUFF REPAIR     • TONSILLECTOMY     • UPPER GASTROINTESTINAL ENDOSCOPY          Social History     Tobacco Use   • Smoking status: Former Smoker   • Smokeless tobacco: Former User     Types: Chew     Quit date: 2006   • Tobacco comment: QUIT 1975   Vaping Use   • Vaping Use: Never used   Substance Use Topics   • Alcohol use: Not Currently     Comment: drinks weekly   • Drug use: Never       Family History   Problem Relation Age of Onset   • Colon cancer Other    • Heart disease Father    • Heart attack Father 57        MI   • Diabetes Brother    • Colon cancer Daughter 41   • Malig Hyperthermia Neg Hx         Prior to Admission medications    Medication Sig Start Date End Date Taking? Authorizing Provider   allopurinol (ZYLOPRIM) 100 MG tablet allopurinol 100 mg oral tablet take 1 tablet (100 mg) by oral route once daily   Suspended   Yes Provider, Historical, MD   ascorbic acid (VITAMIN C) 500 MG capsule controlled-release CR capsule Vitamin C 500 mg oral capsule, extended release take 1 capsule by oral route daily   Active   Yes Provider, Historical, MD   atorvastatin (LIPITOR) 40 MG tablet Take 40 mg by mouth Daily.   Yes Provider, Historical, MD   fexofenadine (ALLEGRA) 180 MG tablet Allergy Relief (fexofenadine) 180 mg oral tablet take 1 tablet (180 mg) by oral route once daily   Active   Yes Provider, Historical, MD   fluticasone (FLONASE) 50 MCG/ACT nasal spray fluticasone 50 mcg/actuation nasal spray,suspension inhale 1 spray (50 mcg) in each nostril by intranasal route once daily   Active   Yes Provider, Historical, MD   levothyroxine (SYNTHROID, LEVOTHROID) 137 MCG tablet Take 137 mcg by mouth Daily.   Yes Provider, Historical, MD   losartan (COZAAR) 50 MG tablet Take 1 tablet by mouth 2 (Two) Times a Day. 4/6/22  Yes  "Jackie Ochoa MD   meloxicam (MOBIC) 15 MG tablet TAKE 1 TABLET BY MOUTH EVERY DAY  Patient taking differently: Every Other Day. 3/2/22  Yes Bao Vigil MD   montelukast (SINGULAIR) 10 MG tablet montelukast 10 mg oral tablet take 1 tablet (10 mg) by oral route once daily in the evening   Active   Yes Prakash Romero MD   oxymetazoline (Afrin Nasal Spray) 0.05 % nasal spray 2 sprays into the nostril(s) as directed by provider 1 (One) Time Per Week.   Yes Prakash Romero MD   pantoprazole (PROTONIX) 40 MG EC tablet pantoprazole 40 mg oral tablet,delayed release (DR/EC) take 1 tablet (40 mg) by oral route once daily   Active   Yes Prakash Romero MD   spironolactone-hydrochlorothiazide (ALDACTAZIDE) 25-25 MG tablet Take 1/2 tablet by mouth every other day ]   Yes Prakash Romero MD   traZODone (DESYREL) 50 MG tablet Take 50 mg by mouth Every Night. 1-2 TABS   Yes Prakash Romero MD   aspirin 81 MG EC tablet Take 81 mg by mouth Every Night.    Prakash Romero MD        Review of Systems   Constitutional: Negative for fatigue.   Respiratory: Positive for shortness of breath. Negative for cough.    Cardiovascular: Positive for leg swelling. Negative for chest pain and palpitations.   Neurological: Negative for dizziness.        Objective     /65   Pulse 58   Ht 175.3 cm (69\")   Wt 112 kg (246 lb)   BMI 36.33 kg/m²       Physical Exam  Constitutional:       General: He is awake.      Appearance: Normal appearance.   Neck:      Thyroid: No thyromegaly.      Vascular: No carotid bruit or JVD.   Cardiovascular:      Rate and Rhythm: Normal rate and regular rhythm.      Chest Wall: PMI is not displaced.      Pulses: Normal pulses.      Heart sounds: Normal heart sounds, S1 normal and S2 normal. No murmur heard.    No friction rub. No gallop. No S3 or S4 sounds.   Pulmonary:      Effort: Pulmonary effort is normal.      Breath sounds: Normal breath sounds and air entry. No " wheezing, rhonchi or rales.   Abdominal:      General: Bowel sounds are normal.      Palpations: Abdomen is soft. There is no mass.      Tenderness: There is no abdominal tenderness.   Musculoskeletal:      Cervical back: Neck supple.      Right lower leg: Edema present.      Left lower leg: Edema present.   Neurological:      Mental Status: He is alert and oriented to person, place, and time.   Psychiatric:         Mood and Affect: Mood normal.         Behavior: Behavior is cooperative.           Result Review :                           Assessment and Plan        Diagnoses and all orders for this visit:    1. SOB (shortness of breath) on exertion (Primary)  Assessment & Plan:  He complains of mild to moderate shortness of breath on moderate to heavy exertion.  Going to increase his diuretic spironolactone HCTZ to 1 tablet every other day and obtain an echocardiogram and a BNP.      2. Hypertension, essential  Assessment & Plan:  His blood pressure is little bit on the high side.  I recommended that he cut down his meloxicam to half a tablet every other day instead of a whole tablet.  In addition we will increase his diuretic.  He will call us in 2 weeks to let us know how is he doing and will maintain a 2-week blood pressure log    Orders:  -     losartan (COZAAR) 50 MG tablet; Take 1 tablet by mouth 2 (Two) Times a Day.  Dispense: 90 tablet; Refill: 3  -     Basic Metabolic Panel; Future  -     Lipid Panel; Future  -     Comprehensive Metabolic Panel; Future  -     Magnesium; Future  -     proBNP; Future  -     Adult Transthoracic Echo Complete W/ Cont if Necessary Per Protocol; Future    3. Coronary artery disease involving native heart without angina pectoris, unspecified vessel or lesion type  -     losartan (COZAAR) 50 MG tablet; Take 1 tablet by mouth 2 (Two) Times a Day.  Dispense: 90 tablet; Refill: 3  -     Basic Metabolic Panel; Future  -     Lipid Panel; Future  -     Comprehensive Metabolic Panel;  Future  -     Magnesium; Future  -     proBNP; Future  -     Adult Transthoracic Echo Complete W/ Cont if Necessary Per Protocol; Future    4. Bilateral primary osteoarthritis of knee  -     meloxicam (MOBIC) 15 MG tablet; Take 0.5 tablets by mouth Every Other Day.  Dispense: 30 tablet; Refill: 1  -     proBNP; Future  -     Adult Transthoracic Echo Complete W/ Cont if Necessary Per Protocol; Future    5. Sick sinus syndrome (HCC)    6. Hyperlipidemia, unspecified hyperlipidemia type    Other orders  -     atorvastatin (LIPITOR) 40 MG tablet; Take 1 tablet by mouth Daily.  Dispense: 90 tablet; Refill: 3  -     spironolactone-hydrochlorothiazide (ALDACTAZIDE) 25-25 MG tablet; Take 1 tablet by mouth Every Other Day.  Dispense: 45 tablet; Refill: 3          Follow Up     Return for fu with dr Winters. echo next available, EKG with next office visit.    Patient was given instructions and counseling regarding his condition or for health maintenance advice. Please see specific information pulled into the AVS if appropriate.     Jackie Ochoa MD  06/16/22 09:13 EDT

## 2022-06-16 NOTE — ASSESSMENT & PLAN NOTE
His blood pressure is little bit on the high side.  I recommended that he cut down his meloxicam to half a tablet every other day instead of a whole tablet.  In addition we will increase his diuretic.  He will call us in 2 weeks to let us know how is he doing and will maintain a 2-week blood pressure log

## 2022-06-16 NOTE — ASSESSMENT & PLAN NOTE
He complains of mild to moderate shortness of breath on moderate to heavy exertion.  Going to increase his diuretic spironolactone HCTZ to 1 tablet every other day and obtain an echocardiogram and a BNP.

## 2022-07-18 ENCOUNTER — LAB (OUTPATIENT)
Dept: LAB | Facility: HOSPITAL | Age: 72
End: 2022-07-18

## 2022-07-18 DIAGNOSIS — M17.0 BILATERAL PRIMARY OSTEOARTHRITIS OF KNEE: ICD-10-CM

## 2022-07-18 DIAGNOSIS — I10 HYPERTENSION, ESSENTIAL: ICD-10-CM

## 2022-07-18 DIAGNOSIS — I25.10 CORONARY ARTERY DISEASE INVOLVING NATIVE HEART WITHOUT ANGINA PECTORIS, UNSPECIFIED VESSEL OR LESION TYPE: ICD-10-CM

## 2022-07-18 LAB
ALBUMIN SERPL-MCNC: 4.2 G/DL (ref 3.5–5.2)
ALBUMIN/GLOB SERPL: 1.4 G/DL
ALP SERPL-CCNC: 83 U/L (ref 39–117)
ALT SERPL W P-5'-P-CCNC: 26 U/L (ref 1–41)
ANION GAP SERPL CALCULATED.3IONS-SCNC: 10.8 MMOL/L (ref 5–15)
AST SERPL-CCNC: 33 U/L (ref 1–40)
BILIRUB SERPL-MCNC: 0.7 MG/DL (ref 0–1.2)
BUN SERPL-MCNC: 24 MG/DL (ref 8–23)
BUN/CREAT SERPL: 15.5 (ref 7–25)
CALCIUM SPEC-SCNC: 9.3 MG/DL (ref 8.6–10.5)
CHLORIDE SERPL-SCNC: 105 MMOL/L (ref 98–107)
CHOLEST SERPL-MCNC: 113 MG/DL (ref 0–200)
CO2 SERPL-SCNC: 24.2 MMOL/L (ref 22–29)
CREAT SERPL-MCNC: 1.55 MG/DL (ref 0.76–1.27)
EGFRCR SERPLBLD CKD-EPI 2021: 46.7 ML/MIN/1.73
GLOBULIN UR ELPH-MCNC: 3 GM/DL
GLUCOSE SERPL-MCNC: 95 MG/DL (ref 65–99)
HDLC SERPL-MCNC: 42 MG/DL (ref 40–60)
LDLC SERPL CALC-MCNC: 53 MG/DL (ref 0–100)
LDLC/HDLC SERPL: 1.25 {RATIO}
MAGNESIUM SERPL-MCNC: 2.2 MG/DL (ref 1.6–2.4)
NT-PROBNP SERPL-MCNC: 7.9 PG/ML (ref 0–900)
POTASSIUM SERPL-SCNC: 4.8 MMOL/L (ref 3.5–5.2)
PROT SERPL-MCNC: 7.2 G/DL (ref 6–8.5)
SODIUM SERPL-SCNC: 140 MMOL/L (ref 136–145)
TRIGL SERPL-MCNC: 93 MG/DL (ref 0–150)
VLDLC SERPL-MCNC: 18 MG/DL (ref 5–40)

## 2022-07-18 PROCEDURE — 36415 COLL VENOUS BLD VENIPUNCTURE: CPT

## 2022-07-18 PROCEDURE — 83735 ASSAY OF MAGNESIUM: CPT

## 2022-07-18 PROCEDURE — 83880 ASSAY OF NATRIURETIC PEPTIDE: CPT

## 2022-07-18 PROCEDURE — 80053 COMPREHEN METABOLIC PANEL: CPT

## 2022-07-18 PROCEDURE — 80061 LIPID PANEL: CPT

## 2022-07-20 ENCOUNTER — TELEPHONE (OUTPATIENT)
Dept: CARDIOLOGY | Facility: CLINIC | Age: 72
End: 2022-07-20

## 2022-07-20 DIAGNOSIS — I10 HYPERTENSION, ESSENTIAL: Primary | ICD-10-CM

## 2022-07-20 NOTE — TELEPHONE ENCOUNTER
----- Message from Jackie Ochoa MD sent at 7/18/2022  6:16 PM EDT -----  Let patient know: Lipids look great, creatinine slightly worse.  Repeat BMP in 1 month.    Thanks

## 2022-08-05 ENCOUNTER — TELEPHONE (OUTPATIENT)
Dept: CARDIOLOGY | Facility: CLINIC | Age: 72
End: 2022-08-05

## 2022-08-05 NOTE — TELEPHONE ENCOUNTER
----- Message from Roseann Calle RN sent at 8/4/2022  3:03 PM EDT -----  Per Dr. Ochoa, echo shows good heart function and no significant valve issues. Keep f/u as scheduled.

## 2022-08-18 ENCOUNTER — OFFICE VISIT (OUTPATIENT)
Dept: ORTHOPEDIC SURGERY | Facility: CLINIC | Age: 72
End: 2022-08-18

## 2022-08-18 VITALS — BODY MASS INDEX: 36.88 KG/M2 | HEIGHT: 69 IN | WEIGHT: 249 LBS

## 2022-08-18 DIAGNOSIS — M17.0 BILATERAL PRIMARY OSTEOARTHRITIS OF KNEE: ICD-10-CM

## 2022-08-18 DIAGNOSIS — M25.561 PAIN IN BOTH KNEES, UNSPECIFIED CHRONICITY: Primary | ICD-10-CM

## 2022-08-18 DIAGNOSIS — M25.562 PAIN IN BOTH KNEES, UNSPECIFIED CHRONICITY: Primary | ICD-10-CM

## 2022-08-18 PROCEDURE — 99213 OFFICE O/P EST LOW 20 MIN: CPT | Performed by: ORTHOPAEDIC SURGERY

## 2022-08-18 PROCEDURE — 20610 DRAIN/INJ JOINT/BURSA W/O US: CPT | Performed by: ORTHOPAEDIC SURGERY

## 2022-08-18 NOTE — PROGRESS NOTES
"Chief Complaint  Follow-up of the Right Knee (Worse) and Follow-up of the Left Knee     Subjective      Percy Velarde presents to [facility] for a follow-up of bilateral knees, right worse than the left. Patient has bilateral knee osteoarthritis, last visit he was given an steroid pack and prescribed an anti-inflammatory.  His last visit was November of 2021. He reports his heart doctor cut him back on the anti-inflammatory. Instead of taking one a day, he takes a half every other day. His heart doctor told him his kidney functions \"were not great\". He denies any injury or trauma to the knees. Pain has progressively worsened since he was last seen in my office.     No Known Allergies     Social History     Socioeconomic History   • Marital status:    Tobacco Use   • Smoking status: Former Smoker   • Smokeless tobacco: Former User     Types: Chew     Quit date: 2006   • Tobacco comment: QUIT 1975   Vaping Use   • Vaping Use: Never used   Substance and Sexual Activity   • Alcohol use: Not Currently     Comment: drinks weekly   • Drug use: Never   • Sexual activity: Defer        Review of Systems     Objective   Vital Signs:   Ht 175.3 cm (69\")   Wt 113 kg (249 lb)   BMI 36.77 kg/m²       Physical Exam  Constitutional:       Appearance: Normal appearance. Patient is well-developed and normal weight.   HENT:      Head: Normocephalic.      Right Ear: Hearing and external ear normal.      Left Ear: Hearing and external ear normal.      Nose: Nose normal.   Eyes:      Conjunctiva/sclera: Conjunctivae normal.   Cardiovascular:      Rate and Rhythm: Normal rate.   Pulmonary:      Effort: Pulmonary effort is normal.      Breath sounds: No wheezing or rales.   Abdominal:      Palpations: Abdomen is soft.      Tenderness: There is no abdominal tenderness.   Musculoskeletal:      Cervical back: Normal range of motion.   Skin:     Findings: No rash.   Neurological:      Mental Status: " Patient is alert and oriented to person, place, and time.   Psychiatric:         Mood and Affect: Mood and affect normal.         Judgment: Judgment normal.       Ortho Exam      BILATERAL KNEES: Limping gait. Mild swelling, bilaterally. Sensation grossly intact. Neurovascular intact.  Good strength to hamstrings, quadriceps, dorsiflexors and plantar flexors. Stable to varus/valgus stress. Stable anterior and posterior drawer. Negative LAchman. Tender medial joint line.       Large Joint Arthrocentesis  Date/Time: 8/18/2022 9:13 AM  Consent given by: patient  Site marked: site marked  Timeout: Immediately prior to procedure a time out was called to verify the correct patient, procedure, equipment, support staff and site/side marked as required   Supporting Documentation  Indications: pain   Procedure Details  Location: RIGHT KNEE.  Needle gauge: 21G.  Medications administered: 48 mg hylan 48 MG/6ML  Patient tolerance: patient tolerated the procedure well with no immediate complications            Imaging Results (Most Recent)     Procedure Component Value Units Date/Time    XR Knee 3 View Right [[id number]] Resulted: 08/18/22 0823     Updated: 08/18/22 0827    XR Knee 3 View Left [[id number]] Resulted: 08/18/22 0823     Updated: 08/18/22 0827           Result Review :     X-Ray Report:  Bilateral knee(s) X-Ray  Indication: Evaluation of bilateral knee pain   AP, Lateral and Standing view(s)  Findings: Joint space narrowing, bilaterally, most prominent in the medial compartment. No acute fractures or dislocation.   Prior studies available for comparison: no     Assessment and Plan     Diagnoses and all orders for this visit:    1. Pain in both knees, unspecified chronicity (Primary)  -     XR Knee 3 View Right  -     XR Knee 3 View Left    2. Bilateral primary osteoarthritis of knee        Discussed different types of injections vs total knee replacement. He opted to try an injection today. Right Synvisc One injections  given, patient tolerated this well.     Call or return if worsening symptoms.    Follow Up     3-4 weeks.     Patient was given instructions and counseling regarding his condition or for health maintenance advice. Please see specific information pulled into the AVS if appropriate.     Scribed for Bao Vigil MD by Amanda Humphrey.  08/18/22   08:51 EDT    I have personally performed the services described in this document as scribed by the above individual and it is both accurate and complete. Bao Vigil MD 08/18/22

## 2022-08-19 ENCOUNTER — TELEPHONE (OUTPATIENT)
Dept: CARDIOLOGY | Facility: CLINIC | Age: 72
End: 2022-08-19

## 2022-08-19 ENCOUNTER — LAB (OUTPATIENT)
Dept: LAB | Facility: HOSPITAL | Age: 72
End: 2022-08-19

## 2022-08-19 DIAGNOSIS — I10 HYPERTENSION, ESSENTIAL: ICD-10-CM

## 2022-08-19 LAB
ANION GAP SERPL CALCULATED.3IONS-SCNC: 9.4 MMOL/L (ref 5–15)
BUN SERPL-MCNC: 20 MG/DL (ref 8–23)
BUN/CREAT SERPL: 14 (ref 7–25)
CALCIUM SPEC-SCNC: 9.3 MG/DL (ref 8.6–10.5)
CHLORIDE SERPL-SCNC: 105 MMOL/L (ref 98–107)
CO2 SERPL-SCNC: 22.6 MMOL/L (ref 22–29)
CREAT SERPL-MCNC: 1.43 MG/DL (ref 0.76–1.27)
EGFRCR SERPLBLD CKD-EPI 2021: 51.4 ML/MIN/1.73
GLUCOSE SERPL-MCNC: 96 MG/DL (ref 65–99)
POTASSIUM SERPL-SCNC: 4.3 MMOL/L (ref 3.5–5.2)
SODIUM SERPL-SCNC: 137 MMOL/L (ref 136–145)

## 2022-08-19 PROCEDURE — 36415 COLL VENOUS BLD VENIPUNCTURE: CPT

## 2022-08-19 PROCEDURE — 80048 BASIC METABOLIC PNL TOTAL CA: CPT

## 2022-08-19 NOTE — TELEPHONE ENCOUNTER
Patient needing the lab order to recheck his kindeys. Lab order placed per the recommendations of previous result to recheck BMP in 1 month.

## 2022-08-25 ENCOUNTER — TELEPHONE (OUTPATIENT)
Dept: CARDIOLOGY | Facility: CLINIC | Age: 72
End: 2022-08-25

## 2022-08-25 NOTE — TELEPHONE ENCOUNTER
----- Message from Jackie Ochoa MD sent at 8/22/2022  5:55 PM EDT -----  Inform the patient that his kidney function has improved compared to 1 month ago

## 2022-09-08 ENCOUNTER — OFFICE VISIT (OUTPATIENT)
Dept: ORTHOPEDIC SURGERY | Facility: CLINIC | Age: 72
End: 2022-09-08

## 2022-09-08 VITALS — BODY MASS INDEX: 36.88 KG/M2 | OXYGEN SATURATION: 99 % | HEART RATE: 67 BPM | HEIGHT: 69 IN | WEIGHT: 249 LBS

## 2022-09-08 DIAGNOSIS — M17.0 BILATERAL PRIMARY OSTEOARTHRITIS OF KNEE: Primary | ICD-10-CM

## 2022-09-08 PROCEDURE — 99213 OFFICE O/P EST LOW 20 MIN: CPT | Performed by: ORTHOPAEDIC SURGERY

## 2022-09-08 NOTE — PROGRESS NOTES
Chief Complaint  No chief complaint on file.     Subjective      Percy Velarde presents to [facility] for a follow-up of bilateral knees. Patient has osteoarthritis of bilateral knees. He has tried steroid packs, anti-inflammatory medication and recently a Synvisc One injection in the right knee. Synvisc One injection gave some relief to his right knee pain. His heart doctor has cut him back on anti-inflammatory medication. Right knee is doing better than the left knee at this time.     No Known Allergies     Social History     Socioeconomic History   • Marital status:    Tobacco Use   • Smoking status: Former Smoker   • Smokeless tobacco: Former User     Types: Chew     Quit date: 2006   • Tobacco comment: QUIT 1975   Vaping Use   • Vaping Use: Never used   Substance and Sexual Activity   • Alcohol use: Not Currently     Comment: drinks weekly   • Drug use: Never   • Sexual activity: Defer        Review of Systems     Objective   Vital Signs:   There were no vitals taken for this visit.      Physical Exam  Constitutional:       Appearance: Normal appearance. Patient is well-developed and normal weight.   HENT:      Head: Normocephalic.      Right Ear: Hearing and external ear normal.      Left Ear: Hearing and external ear normal.      Nose: Nose normal.   Eyes:      Conjunctiva/sclera: Conjunctivae normal.   Cardiovascular:      Rate and Rhythm: Normal rate.   Pulmonary:      Effort: Pulmonary effort is normal.      Breath sounds: No wheezing or rales.   Abdominal:      Palpations: Abdomen is soft.      Tenderness: There is no abdominal tenderness.   Musculoskeletal:      Cervical back: Normal range of motion.   Skin:     Findings: No rash.   Neurological:      Mental Status: Patient is alert and oriented to person, place, and time.   Psychiatric:         Mood and Affect: Mood and affect normal.         Judgment: Judgment normal.       Ortho Exam      BILATERAL KNEES: Limping  gait. No swelling, skin discoloration or atrophy. Good strength to hamstrings, quadriceps, dorsiflexors and plantar flexors. Stable to varus/valgus stress. Stable anterior and posterior drawer. Negative Lachman. Tender medial joint line. Crepitus with motion. Skin intact. Sensation grossly intact. Neurovascular intact.        Procedures      Imaging Results (Most Recent)     None           Result Review :         XR Knee 3 View Left    Result Date: 8/18/2022  Narrative: X-Ray Report: Bilateral knee(s) X-Ray Indication: Evaluation of bilateral knee pain AP, Lateral and Standing view(s) Findings: Joint space narrowing, bilaterally, most prominent in the medial compartment. No acute fractures or dislocation. Prior studies available for comparison: no     XR Knee 3 View Right    Result Date: 8/18/2022  Narrative: X-Ray Report: Bilateral knee(s) X-Ray Indication: Evaluation of bilateral knee pain AP, Lateral and Standing view(s) Findings: Joint space narrowing, bilaterally, most prominent in the medial compartment. No acute fractures or dislocation. Prior studies available for comparison: no              Assessment and Plan     Diagnoses and all orders for this visit:    1. Bilateral primary osteoarthritis of knee (Primary)        He is a candidate for a total knee replacement, discussed post-operative care, risks and benefits of knee replacement. He understands his options. Patient opted to continue letting the Synvisc One injection to work on the right knee. He will continue conservative measures until knee pain worsens.     Call or return if worsening symptoms.    Follow Up     PRN.       Patient was given instructions and counseling regarding his condition or for health maintenance advice. Please see specific information pulled into the AVS if appropriate.     Scribed for Bao Vigil MD by Amanda Humphrey.  09/08/22   08:09 EDT    I have personally performed the services described in this document as scribed by  the above individual and it is both accurate and complete. Bao Vigil MD 09/08/22

## 2022-10-26 DIAGNOSIS — M17.0 BILATERAL PRIMARY OSTEOARTHRITIS OF KNEE: ICD-10-CM

## 2022-10-26 RX ORDER — MELOXICAM 15 MG/1
TABLET ORAL
Qty: 30 TABLET | Refills: 1 | Status: SHIPPED | OUTPATIENT
Start: 2022-10-26 | End: 2023-02-14 | Stop reason: HOSPADM

## 2022-11-07 ENCOUNTER — PREP FOR SURGERY (OUTPATIENT)
Dept: OTHER | Facility: HOSPITAL | Age: 72
End: 2022-11-07

## 2022-11-07 ENCOUNTER — CLINICAL SUPPORT (OUTPATIENT)
Dept: GASTROENTEROLOGY | Facility: CLINIC | Age: 72
End: 2022-11-07

## 2022-11-07 DIAGNOSIS — Z80.0 FAMILY HISTORY OF COLON CANCER: Primary | ICD-10-CM

## 2022-11-07 NOTE — PROGRESS NOTES
Percy Velarde  REASON FOR CALL: SCREENING CALL, LAST COLON 12.10.2019 DR. RONDON  SENT IN PREP: SARIAH  DOS:02.06.2023    Past Medical History:   Diagnosis Date   • Arthritis    • COPD (chronic obstructive pulmonary disease) (HCC)    • Coronary artery disease involving native heart without angina pectoris 11/24/2021   • ETD (Eustachian tube dysfunction), bilateral    • GERD (gastroesophageal reflux disease)    • Gout    • H/O arthroscopy of shoulder 09/28/2017    aftercare following, right    • Hearing loss    • History of arthroscopy of right shoulder 01/11/2018   • HTN (hypertension)    • Hyperlipemia    • Hypertension, essential    • Hypothyroidism    • Limb swelling    • Lipoma    • Low back pain    • Melanoma (HCC)    • Nasal septal ulcer    • Otalgia    • Personal history of colonic polyps 07/14/2014   • Recurrent epistaxis    • Recurrent otitis media    • Rotator cuff tear arthropathy of right shoulder 08/01/2017   • Seasonal allergies    • Sleep apnea    • Thyroid disorder    • Tinnitus, bilateral      No Known Allergies  Past Surgical History:   Procedure Laterality Date   • CHOLECYSTECTOMY     • COLONOSCOPY  12/10/2019    DR. RONDON   • ENDOSCOPY     • ENDOSCOPY N/A 09/20/2021    Procedure: ESOPHAGOGASTRODUODENOSCOPY with biopsy;  Surgeon: Mich Rondon MD;  Location: [facility] ENDOSCOPY;  Service: Gastroenterology;  Laterality: N/A;  gastric polyps   • INTRAOCULAR LENS INSERTION      eye implant, yes    • LIPOMA EXCISION  02/26/2019    removal    • MYRINGOTOMY W/ TUBES      states had had several pairs placed by Dr. Kaba    • OTHER SURGICAL HISTORY Left 2013    melanoma excision from shoulder    • ROTATOR CUFF REPAIR     • TONSILLECTOMY     • UPPER GASTROINTESTINAL ENDOSCOPY       Social History     Socioeconomic History   • Marital status:    Tobacco Use   • Smoking status: Former   • Smokeless tobacco: Former     Types: Chew     Quit date: 2006   • Tobacco comments:     QUIT 1975    Vaping Use   • Vaping Use: Never used   Substance and Sexual Activity   • Alcohol use: Not Currently     Comment: drinks weekly   • Drug use: Never   • Sexual activity: Defer     Family History   Problem Relation Age of Onset   • Heart disease Father    • Heart attack Father 57        MI   • Diabetes Brother    • Colon cancer Other    • Malig Hyperthermia Neg Hx        Current Outpatient Medications:   •  allopurinol (ZYLOPRIM) 100 MG tablet, allopurinol 100 mg oral tablet take 1 tablet (100 mg) by oral route once daily   Suspended, Disp: , Rfl:   •  ascorbic acid (VITAMIN C) 500 MG capsule controlled-release CR capsule, Vitamin C 500 mg oral capsule, extended release take 1 capsule by oral route daily   Active, Disp: , Rfl:   •  aspirin 81 MG EC tablet, Take 1 tablet by mouth Every Other Day., Disp: 45 tablet, Rfl: 3  •  atorvastatin (LIPITOR) 40 MG tablet, Take 1 tablet by mouth Daily., Disp: 90 tablet, Rfl: 3  •  fexofenadine (ALLEGRA) 180 MG tablet, Allergy Relief (fexofenadine) 180 mg oral tablet take 1 tablet (180 mg) by oral route once daily   Active, Disp: , Rfl:   •  fluticasone (FLONASE) 50 MCG/ACT nasal spray, fluticasone 50 mcg/actuation nasal spray,suspension inhale 1 spray (50 mcg) in each nostril by intranasal route once daily   Active, Disp: , Rfl:   •  levothyroxine (SYNTHROID, LEVOTHROID) 137 MCG tablet, Take 137 mcg by mouth Daily., Disp: , Rfl:   •  losartan (COZAAR) 50 MG tablet, Take 1 tablet by mouth 2 (Two) Times a Day., Disp: 90 tablet, Rfl: 3  •  meloxicam (MOBIC) 15 MG tablet, TAKE 1 TABLET BY MOUTH EVERY DAY, Disp: 30 tablet, Rfl: 1  •  montelukast (SINGULAIR) 10 MG tablet, montelukast 10 mg oral tablet take 1 tablet (10 mg) by oral route once daily in the evening   Active, Disp: , Rfl:   •  oxymetazoline (Afrin Nasal Spray) 0.05 % nasal spray, 2 sprays into the nostril(s) as directed by provider 1 (One) Time Per Week., Disp: , Rfl:   •  pantoprazole (PROTONIX) 40 MG EC tablet,  pantoprazole 40 mg oral tablet,delayed release (DR/EC) take 1 tablet (40 mg) by oral route once daily   Active, Disp: , Rfl:   •  spironolactone-hydrochlorothiazide (ALDACTAZIDE) 25-25 MG tablet, Take 1 tablet by mouth Every Other Day., Disp: 45 tablet, Rfl: 3  •  traZODone (DESYREL) 50 MG tablet, Take 50 mg by mouth Every Night. 1-2 TABS, Disp: , Rfl:

## 2023-01-03 ENCOUNTER — OFFICE VISIT (OUTPATIENT)
Dept: ORTHOPEDIC SURGERY | Facility: CLINIC | Age: 73
End: 2023-01-03
Payer: MEDICARE

## 2023-01-03 ENCOUNTER — PREP FOR SURGERY (OUTPATIENT)
Dept: OTHER | Facility: HOSPITAL | Age: 73
End: 2023-01-03
Payer: COMMERCIAL

## 2023-01-03 VITALS — WEIGHT: 240 LBS | OXYGEN SATURATION: 97 % | HEART RATE: 74 BPM | HEIGHT: 69 IN | BODY MASS INDEX: 35.55 KG/M2

## 2023-01-03 DIAGNOSIS — M17.0 BILATERAL PRIMARY OSTEOARTHRITIS OF KNEE: Primary | ICD-10-CM

## 2023-01-03 DIAGNOSIS — M17.0 BILATERAL PRIMARY OSTEOARTHRITIS OF KNEE: ICD-10-CM

## 2023-01-03 DIAGNOSIS — M25.551 RIGHT HIP PAIN: Primary | ICD-10-CM

## 2023-01-03 PROBLEM — M17.9 OA (OSTEOARTHRITIS) OF KNEE: Status: ACTIVE | Noted: 2023-01-03

## 2023-01-03 PROCEDURE — 96372 THER/PROPH/DIAG INJ SC/IM: CPT | Performed by: ORTHOPAEDIC SURGERY

## 2023-01-03 PROCEDURE — 1159F MED LIST DOCD IN RCRD: CPT | Performed by: ORTHOPAEDIC SURGERY

## 2023-01-03 PROCEDURE — 1160F RVW MEDS BY RX/DR IN RCRD: CPT | Performed by: ORTHOPAEDIC SURGERY

## 2023-01-03 PROCEDURE — 99213 OFFICE O/P EST LOW 20 MIN: CPT | Performed by: ORTHOPAEDIC SURGERY

## 2023-01-03 RX ORDER — FERROUS SULFATE 325(65) MG
1 TABLET ORAL EVERY OTHER DAY
COMMUNITY
Start: 2022-12-15

## 2023-01-03 RX ORDER — TRANEXAMIC ACID 10 MG/ML
1000 INJECTION, SOLUTION INTRAVENOUS ONCE
Status: CANCELLED | OUTPATIENT
Start: 2023-01-03 | End: 2023-01-03

## 2023-01-03 RX ORDER — DEXAMETHASONE SODIUM PHOSPHATE 4 MG/ML
8 INJECTION, SOLUTION INTRA-ARTICULAR; INTRALESIONAL; INTRAMUSCULAR; INTRAVENOUS; SOFT TISSUE ONCE
Status: COMPLETED | OUTPATIENT
Start: 2023-01-03 | End: 2023-01-03

## 2023-01-03 RX ORDER — CEFAZOLIN SODIUM 2 G/100ML
2 INJECTION, SOLUTION INTRAVENOUS ONCE
Status: CANCELLED | OUTPATIENT
Start: 2023-01-03 | End: 2023-01-03

## 2023-01-03 RX ORDER — CEFAZOLIN SODIUM IN 0.9 % NACL 3 G/100 ML
3 INTRAVENOUS SOLUTION, PIGGYBACK (ML) INTRAVENOUS ONCE
Status: CANCELLED | OUTPATIENT
Start: 2023-01-03 | End: 2023-01-03

## 2023-01-03 RX ADMIN — DEXAMETHASONE SODIUM PHOSPHATE 8 MG: 4 INJECTION, SOLUTION INTRA-ARTICULAR; INTRALESIONAL; INTRAMUSCULAR; INTRAVENOUS; SOFT TISSUE at 09:00

## 2023-01-03 NOTE — PROGRESS NOTES
Chief Complaint  Follow-up of the Right Knee and Initial Evaluation of the Right Hip     Subjective      Percy Velarde presents to [facility] for follow up of the right knee and initial evaluation of the right hip. Patient has osteoarthritis of bilateral knees. He has tried steroid packs, anti-inflammatory medication. He is having continued pain in the right knee. He now is also having pain in the right knee. He had a gel injection in bilateral knees earlier this year and that didn't help well.      No Known Allergies     Social History     Socioeconomic History   • Marital status:    Tobacco Use   • Smoking status: Former   • Smokeless tobacco: Former     Types: Chew     Quit date: 2006   • Tobacco comments:     QUIT 1975   Vaping Use   • Vaping Use: Never used   Substance and Sexual Activity   • Alcohol use: Not Currently     Comment: drinks weekly   • Drug use: Never   • Sexual activity: Defer        Review of Systems     Objective   Vital Signs:   Pulse 74   Ht 175.3 cm (69\")   Wt 109 kg (240 lb)   SpO2 97%   BMI 35.44 kg/m²       Physical Exam  Constitutional:       Appearance: Normal appearance. Patient is well-developed and normal weight.   HENT:      Head: Normocephalic.      Right Ear: Hearing and external ear normal.      Left Ear: Hearing and external ear normal.      Nose: Nose normal.   Eyes:      Conjunctiva/sclera: Conjunctivae normal.   Cardiovascular:      Rate and Rhythm: Normal rate.   Pulmonary:      Effort: Pulmonary effort is normal.      Breath sounds: No wheezing or rales.   Abdominal:      Palpations: Abdomen is soft.      Tenderness: There is no abdominal tenderness.   Musculoskeletal:      Cervical back: Normal range of motion.   Skin:     Findings: No rash.   Neurological:      Mental Status: Patient is alert and oriented to person, place, and time.   Psychiatric:         Mood and Affect: Mood and affect normal.         Judgment: Judgment normal.        Ortho Exam      RIGHT KNEE Dorsal pedal pulse 2+. Posterior tibialis pulse is 2+. Good tone of hip flexors, hip extensors, and hip abductors. Calf supple. Sensation intact. Neurovascular Intact. Limping gait.  Calf supple.     RIGHT HIP Dorsal pedal pulse 2+. Posterior tibialis pulse is 2+. Good strength to hamstrings, quadriceps, dorsiflexors, and plantar flexors. No swelling. No skin discoloration or muscle atrophy.     Procedures      Imaging Results (Most Recent)     Procedure Component Value Units Date/Time    XR Hip With or Without Pelvis 2 - 3 View Right [[id number]] Resulted: 01/03/23 0906     Updated: 01/03/23 0906    Narrative:      X-Ray Report:  Right hip X-Ray  Indication: Evaluation of the right hip  AP/Lateral and [place] view(s)  Findings: Minimal arthritis. Minimal osseous abnormality, no dislocation   or fracture.   Prior studies available for comparison: No       XR Knee 3 View Right [[id number]] Resulted: 01/03/23 0844     Updated: 01/03/23 0846           Result Review :     X-Ray Report:  Right hip X-Ray  Indication: Evaluation of the right hip  AP/Lateral and [place] view(s)  Findings: Minimal arthritis. Minimal osseous abnormality, no dislocation or fracture.   Prior studies available for comparison: No    X-Ray Report:  Right knee X-Ray  Indication: Evaluation of the right knee  AP/Lateral and [place] view(s)  Findings: Severe arthritis.  Moderate osseous abnormality, no dislocation or fracture.   Prior studies available for comparison: No            Assessment and Plan     Diagnoses and all orders for this visit:    1. Right hip pain (Primary)  -     XR Hip With or Without Pelvis 2 - 3 View Right    2. Bilateral primary osteoarthritis of knee  -     XR Knee 3 View Right  -     dexamethasone (DECADRON) injection 8 mg        Discussed the treatment plan with the patient. Discussed conservative measures as exercises, anti-inflammatory and injection. He is getting an IM injection to the  right hip secondary to compensation of the right knee. Discussed the treatment options with the patient, operative vs non-operative. Discussed right total knee arthroplasty and patient understands the risks and benefits and willing to proceed.     Discussed surgery., Risks/benefits discussed with patient including, but not limited to: infection, bleeding, neurovascular damage, malunion, nonunion, aesthetic deformity, need for further surgery, and death., Discussed with patient the implant type being used during surgery and patient understands and desires to proceed., Surgery pamphlet given. and Call or return if worsening symptoms.    Follow Up     Postoperatively       Patient was given instructions and counseling regarding his condition or for health maintenance advice. Please see specific information pulled into the AVS if appropriate.     Scribed for Bao Vigil MD by Dasia Ribeiro MA.  01/03/23   08:26 EST    I have personally performed the services described in this document as scribed by the above individual and it is both accurate and complete. Bao Vigil MD 01/03/23

## 2023-01-13 ENCOUNTER — OFFICE VISIT (OUTPATIENT)
Dept: CARDIOLOGY | Facility: CLINIC | Age: 73
End: 2023-01-13
Payer: MEDICARE

## 2023-01-13 VITALS
HEIGHT: 69 IN | DIASTOLIC BLOOD PRESSURE: 75 MMHG | BODY MASS INDEX: 36.73 KG/M2 | WEIGHT: 248 LBS | SYSTOLIC BLOOD PRESSURE: 155 MMHG | HEART RATE: 53 BPM

## 2023-01-13 DIAGNOSIS — I51.9 DIASTOLIC DYSFUNCTION, LEFT VENTRICLE: ICD-10-CM

## 2023-01-13 DIAGNOSIS — E78.2 MIXED HYPERLIPIDEMIA: ICD-10-CM

## 2023-01-13 DIAGNOSIS — I10 ESSENTIAL HYPERTENSION: ICD-10-CM

## 2023-01-13 DIAGNOSIS — I25.10 CORONARY ARTERY DISEASE INVOLVING NATIVE CORONARY ARTERY OF NATIVE HEART WITHOUT ANGINA PECTORIS: Primary | ICD-10-CM

## 2023-01-13 PROCEDURE — 99213 OFFICE O/P EST LOW 20 MIN: CPT | Performed by: INTERNAL MEDICINE

## 2023-01-13 NOTE — PROGRESS NOTES
Chief Complaint  Follow-up, Shortness of Breath, and Coronary Artery Disease    Subjective            Percy Velarde presents to [facility]  History of Present Illness  Mr. Velarde was previously followed by Dr. Ochoa and presents to establish care with me today:  -Per review of Dr. Ochoa's old notes, he has a history of coronary artery disease (although he does not report any anginal symptoms today and his last SPECT study in 2018 showed no evidence of ischemia or infarction with preserved LVEF)  -He does experience some mild chronic exertional dyspnea, but this symptom has been stable and unchanged for several years.  He states he stopped exercising as frequently after his wife  and has gained a little weight, but he is very interested in attempting to lose some weight in the spring once the weather improves.  -Mr. Velarde is scheduled to have right knee replacement surgery next month  -No palpitations/tachycardia, orthopnea, PND, lightheadedness, or syncope reported.  He does have some occasional trace bilateral lower extremity edema, which resolves with use of compression stockings or elevating his legs for 1-2 hours.  -His last echo in 2022 showed normal left ventricular systolic function with an estimated ejection fraction of 60-65%, grade Ia diastolic dysfunction, and no hemodynamically significant valvular pathology  -His BP was elevated at 155/75 today, but he reports his BP readings have typically been in the 120s-130s/70s-low 80s at home.  He has not checked his BP at home in approximately a month.  He is attempting to follow a low-salt diet.  -His recent lipids were at goal (LDL level = 53 per last check in 2022)  -He states his iron level was low when his PCP recently checked some lab studies.  Accordingly, he states he has been started on oral iron supplementation.  -No change in his exercise tolerance reported since his last visit with Dr. Ochoa    Past Medical  History:   Diagnosis Date   • Arthritis    • COPD (chronic obstructive pulmonary disease) (HCC)    • Coronary artery disease involving native heart without angina pectoris 11/24/2021   • ETD (Eustachian tube dysfunction), bilateral    • GERD (gastroesophageal reflux disease)    • Gout    • H/O arthroscopy of shoulder 09/28/2017    aftercare following, right    • Hearing loss    • History of arthroscopy of right shoulder 01/11/2018   • HTN (hypertension)    • Hyperlipemia    • Hypertension, essential    • Hypothyroidism    • Limb swelling    • Lipoma    • Low back pain    • Melanoma (HCC)    • Nasal septal ulcer    • Otalgia    • Personal history of colonic polyps 07/14/2014   • Recurrent epistaxis    • Recurrent otitis media    • Rotator cuff tear arthropathy of right shoulder 08/01/2017   • Seasonal allergies    • Sleep apnea    • Thyroid disorder    • Tinnitus, bilateral        Past Surgical History:   • CHOLECYSTECTOMY   • COLONOSCOPY    DR. LANE   • ENDOSCOPY   • ENDOSCOPY    Procedure: ESOPHAGOGASTRODUODENOSCOPY with biopsy;  Surgeon: Mich Lane MD;  Location: [facility];  Service: Gastroenterology;  Laterality: N/A;  gastric polyps   • INTRAOCULAR LENS INSERTION    eye implant, yes    • LIPOMA EXCISION    removal    • MYRINGOTOMY W/ TUBES    states had had several pairs placed by Dr. Kaba    • OTHER SURGICAL HISTORY    melanoma excision from shoulder    • ROTATOR CUFF REPAIR   • TONSILLECTOMY   • UPPER GASTROINTESTINAL ENDOSCOPY       Social History     Tobacco Use   • Smoking status: Former   • Smokeless tobacco: Former     Types: Chew     Quit date: 2006   • Tobacco comments:     QUIT 1975   Vaping Use   • Vaping Use: Never used   Substance Use Topics   • Alcohol use: Not Currently     Comment: drinks weekly   • Drug use: Never       Family History   Problem Relation Age of Onset   • Heart disease Father    • Heart attack Father 57        MI   • Diabetes Brother    • Colon cancer Other     • Malig Hyperthermia Neg Hx         Current Outpatient Medications on File Prior to Visit   Medication Sig   • allopurinol (ZYLOPRIM) 100 MG tablet allopurinol 100 mg oral tablet take 1 tablet (100 mg) by oral route once daily   Suspended   • ascorbic acid (VITAMIN C) 500 MG capsule controlled-release CR capsule Vitamin C 500 mg oral capsule, extended release take 1 capsule by oral route daily   Active   • aspirin 81 MG EC tablet Take 1 tablet by mouth Every Other Day.   • atorvastatin (LIPITOR) 40 MG tablet Take 1 tablet by mouth Daily.   • FeroSul 325 (65 Fe) MG tablet Take 1 tablet by mouth 2 (Two) Times a Day.   • fexofenadine (ALLEGRA) 180 MG tablet Allergy Relief (fexofenadine) 180 mg oral tablet take 1 tablet (180 mg) by oral route once daily   Active   • fluticasone (FLONASE) 50 MCG/ACT nasal spray fluticasone 50 mcg/actuation nasal spray,suspension inhale 1 spray (50 mcg) in each nostril by intranasal route once daily   Active   • levothyroxine (SYNTHROID, LEVOTHROID) 137 MCG tablet Take 137 mcg by mouth Daily.   • losartan (COZAAR) 50 MG tablet Take 1 tablet by mouth 2 (Two) Times a Day.   • meloxicam (MOBIC) 15 MG tablet TAKE 1 TABLET BY MOUTH EVERY DAY   • montelukast (SINGULAIR) 10 MG tablet montelukast 10 mg oral tablet take 1 tablet (10 mg) by oral route once daily in the evening   Active   • oxymetazoline (Afrin Nasal Spray) 0.05 % nasal spray 2 sprays into the nostril(s) as directed by provider 1 (One) Time Per Week.   • pantoprazole (PROTONIX) 40 MG EC tablet pantoprazole 40 mg oral tablet,delayed release (DR/EC) take 1 tablet (40 mg) by oral route once daily   Active   • polyethylene glycol (GoLYTELY) 236 g solution Starting at noon on day prior to procedure, drink 8 ounces every 30 minutes until all gone or stools are clear. May add flavor packet.   • spironolactone-hydrochlorothiazide (ALDACTAZIDE) 25-25 MG tablet Take 1 tablet by mouth Every Other Day.   • traZODone (DESYREL) 50 MG tablet  "Take 50 mg by mouth Every Night. 1-2 TABS     No current facility-administered medications on file prior to visit.       No Known Allergies    Review of Systems   Constitutional: Negative for activity change, chills and fever.   HENT: Negative for hearing loss, nosebleeds and sore throat.    Eyes: Negative for pain and visual disturbance.   Respiratory: Positive for shortness of breath. Negative for cough and wheezing.    Cardiovascular: Positive for leg swelling. Negative for chest pain and palpitations.   Gastrointestinal: Negative for abdominal pain, blood in stool and vomiting.   Genitourinary: Negative for dysuria and hematuria.   Musculoskeletal: Positive for arthralgias. Negative for joint swelling and myalgias.   Skin: Negative for color change, pallor and rash.   Neurological: Negative for tremors, syncope, light-headedness and headache.   Hematological: Negative for adenopathy. Does not bruise/bleed easily.        Objective     /75   Pulse 53   Ht 175.3 cm (69\")   Wt 112 kg (248 lb)   BMI 36.62 kg/m²       Physical Exam  Constitutional:       General: He is not in acute distress.     Appearance: Normal appearance.   HENT:      Head: Atraumatic.      Mouth/Throat:      Mouth: Mucous membranes are moist.      Pharynx: Oropharynx is clear. No oropharyngeal exudate.   Eyes:      General: No scleral icterus.     Conjunctiva/sclera: Conjunctivae normal.   Neck:      Vascular: No carotid bruit or JVD.   Cardiovascular:      Rate and Rhythm: Normal rate and regular rhythm.  No extrasystoles are present.     Chest Wall: PMI is not displaced.      Pulses:           Radial pulses are 2+ on the right side and 2+ on the left side.      Heart sounds: S1 normal and S2 normal. No murmur heard.    No friction rub. No gallop. No S3 or S4 sounds.   Pulmonary:      Effort: Pulmonary effort is normal. No tachypnea or respiratory distress.      Breath sounds: No decreased breath sounds, wheezing, rhonchi or rales. "   Chest:      Chest wall: No tenderness.   Abdominal:      General: Bowel sounds are normal. There is no distension.      Palpations: Abdomen is soft. There is no mass.      Tenderness: There is no abdominal tenderness.      Comments: Obese.   Musculoskeletal:         General: No swelling, tenderness or deformity.      Cervical back: Neck supple. No tenderness.      Right lower leg: No edema.      Left lower leg: No edema.   Skin:     General: Skin is warm and dry.      Coloration: Skin is not jaundiced.      Findings: No erythema or rash.      Nails: There is no clubbing.   Neurological:      General: No focal deficit present.      Mental Status: He is alert and oriented to person, place, and time.      Motor: No weakness.   Psychiatric:         Mood and Affect: Mood normal.         Behavior: Behavior normal.       Result Review :     The following data was reviewed by: Lauro Winters MD on 01/13/2023:    CMP    CMP 7/18/22 8/19/22   Glucose 95 96   BUN 24 (A) 20   Creatinine 1.55 (A) 1.43 (A)   eGFR 46.7 (A) 51.4 (A)   Sodium 140 137   Potassium 4.8 4.3   Chloride 105 105   Calcium 9.3 9.3   Total Protein 7.2    Albumin 4.20    Globulin 3.0    Total Bilirubin 0.7    Alkaline Phosphatase 83    AST (SGOT) 33    ALT (SGPT) 26    Albumin/Globulin Ratio 1.4    BUN/Creatinine Ratio 15.5 14.0   Anion Gap 10.8 9.4   (A) Abnormal value       Comments are available for some flowsheets but are not being displayed.               Lipid Panel    Lipid Panel 6/9/22 7/18/22   Total Cholesterol 114 113   Triglycerides 142 93   HDL Cholesterol 42 42   VLDL Cholesterol 25 18   LDL Cholesterol  47 53   LDL/HDL Ratio 1.04 1.25            Echocardiogram 7/29/22:  • Estimated left ventricular EF was in agreement with the calculated left ventricular EF. Left ventricular ejection fraction appears to be 61 - 65%. Left ventricular systolic function is normal.  • Left ventricular diastolic function is consistent with (grade Ia w/high  LAP) impaired relaxation.  • No significant valvular abnormality is noted.         Assessment and Plan      Diagnoses and all orders for this visit:    1. Coronary artery disease involving native coronary artery of native heart without angina pectoris (Primary)  -     Lipid Panel; Future    2. Essential hypertension    3. Mixed hyperlipidemia  -     Lipid Panel; Future    4. Diastolic dysfunction, left ventricle    -Continue current cardiovascular medications, including aspirin 81 mg daily, atorvastatin, losartan, and Aldactazide.  Overall, his BP appears adequately controlled per review of his home BP readings.  -Will recheck his lipids in the next 1-2 months, as noted above  -Echo findings from last year appeared stable and he has no new symptoms today  -Follow up in Cardiology Clnic in approximately 6 months         Follow Up     Return in about 6 months (around 7/13/2023) for Next scheduled follow up, with PREMA Vargas.    Patient was given instructions and counseling regarding his condition or for health maintenance advice. Please see specific information pulled into the AVS if appropriate.     Percy Velarde  reports that he has quit smoking. He quit smokeless tobacco use about 17 years ago.  His smokeless tobacco use included chew.  I have educated him on the risk of diseases from using tobacco products such as cancer, COPD and heart disease.  Continued tobacco cessation was strongly advised.       Lauro Winters MD, FACC, [facility]  Interventional Cardiology

## 2023-01-23 ENCOUNTER — TELEPHONE (OUTPATIENT)
Dept: GASTROENTEROLOGY | Facility: CLINIC | Age: 73
End: 2023-01-23
Payer: COMMERCIAL

## 2023-01-23 NOTE — TELEPHONE ENCOUNTER
I spoke with Mr Velarde, confirmed his scheduled colonoscopy on 02.06.2023, estimated arrival time of 9:30am. Reminded of liquid diet the day prior. Reminded of bowel prep and instructions. Voiced understanding. luis

## 2023-01-24 DIAGNOSIS — Z96.651 AFTERCARE FOLLOWING RIGHT KNEE JOINT REPLACEMENT SURGERY: Primary | ICD-10-CM

## 2023-01-24 DIAGNOSIS — Z47.1 AFTERCARE FOLLOWING RIGHT KNEE JOINT REPLACEMENT SURGERY: Primary | ICD-10-CM

## 2023-02-01 NOTE — PRE-PROCEDURE INSTRUCTIONS
PT INSTRUCTED ON CLEAR LIQ DIET AND PO SPLIT PREP LAST BM SHOULD BE CLEAR  PT CAN TAKE synthroid   WITH A SIP OF WATER IN THE AM OF THE PROCEDURE ARRIVAL TIME IS 1000 am ON 2/6/2023

## 2023-02-03 ENCOUNTER — TELEPHONE (OUTPATIENT)
Dept: CARDIOLOGY | Facility: CLINIC | Age: 73
End: 2023-02-03
Payer: COMMERCIAL

## 2023-02-03 NOTE — TELEPHONE ENCOUNTER
Procedure: Colonoscopy and/or EGD URGENT- 02/06/23    Medication Directive: NA    PMH: CAD, SOA, HLD, HTN    Last Seen: 01/13/23    Echocardiogram 7/29/22:  • Estimated left ventricular EF was in agreement with the calculated left ventricular EF. Left ventricular ejection fraction appears to be 61 - 65%. Left ventricular systolic function is normal.  • Left ventricular diastolic function is consistent with (grade Ia w/high LAP) impaired relaxation.  • No significant valvular abnormality is noted.

## 2023-02-03 NOTE — TELEPHONE ENCOUNTER
Procedure: Right Knee Replacement    Medication Directive: NA    PMH: CAD, SOA, HLD, HTN    Last Seen: 01/13/23    Echocardiogram 7/29/22:  • Estimated left ventricular EF was in agreement with the calculated left ventricular EF. Left ventricular ejection fraction appears to be 61 - 65%. Left ventricular systolic function is normal.  • Left ventricular diastolic function is consistent with (grade Ia w/high LAP) impaired relaxation.  • No significant valvular abnormality is noted.

## 2023-02-06 ENCOUNTER — ANESTHESIA EVENT (OUTPATIENT)
Dept: GASTROENTEROLOGY | Facility: HOSPITAL | Age: 73
End: 2023-02-06
Payer: MEDICARE

## 2023-02-06 ENCOUNTER — HOSPITAL ENCOUNTER (OUTPATIENT)
Facility: HOSPITAL | Age: 73
Setting detail: HOSPITAL OUTPATIENT SURGERY
Discharge: HOME | End: 2023-02-06
Attending: INTERNAL MEDICINE | Admitting: INTERNAL MEDICINE
Payer: MEDICARE

## 2023-02-06 ENCOUNTER — ANESTHESIA (OUTPATIENT)
Dept: GASTROENTEROLOGY | Facility: HOSPITAL | Age: 73
End: 2023-02-06
Payer: MEDICARE

## 2023-02-06 ENCOUNTER — TELEPHONE (OUTPATIENT)
Dept: ORTHOPEDIC SURGERY | Facility: CLINIC | Age: 73
End: 2023-02-06
Payer: COMMERCIAL

## 2023-02-06 VITALS
TEMPERATURE: 97.6 F | SYSTOLIC BLOOD PRESSURE: 127 MMHG | HEART RATE: 54 BPM | BODY MASS INDEX: 36.79 KG/M2 | OXYGEN SATURATION: 94 % | WEIGHT: 249.12 LBS | RESPIRATION RATE: 15 BRPM | DIASTOLIC BLOOD PRESSURE: 86 MMHG

## 2023-02-06 DIAGNOSIS — Z80.0 FAMILY HISTORY OF COLON CANCER: ICD-10-CM

## 2023-02-06 DIAGNOSIS — Z01.818 PRE-OP TESTING: Primary | ICD-10-CM

## 2023-02-06 PROCEDURE — 88305 TISSUE EXAM BY PATHOLOGIST: CPT | Performed by: INTERNAL MEDICINE

## 2023-02-06 PROCEDURE — 45385 COLONOSCOPY W/LESION REMOVAL: CPT | Performed by: INTERNAL MEDICINE

## 2023-02-06 PROCEDURE — 25010000002 PROPOFOL 10 MG/ML EMULSION: Performed by: NURSE ANESTHETIST, CERTIFIED REGISTERED

## 2023-02-06 RX ORDER — LIDOCAINE HYDROCHLORIDE 20 MG/ML
INJECTION, SOLUTION EPIDURAL; INFILTRATION; INTRACAUDAL; PERINEURAL AS NEEDED
Status: DISCONTINUED | OUTPATIENT
Start: 2023-02-06 | End: 2023-02-06 | Stop reason: SURG

## 2023-02-06 RX ORDER — SODIUM CHLORIDE, SODIUM LACTATE, POTASSIUM CHLORIDE, CALCIUM CHLORIDE 600; 310; 30; 20 MG/100ML; MG/100ML; MG/100ML; MG/100ML
30 INJECTION, SOLUTION INTRAVENOUS CONTINUOUS
Status: DISCONTINUED | OUTPATIENT
Start: 2023-02-06 | End: 2023-02-06 | Stop reason: HOSPADM

## 2023-02-06 RX ADMIN — SODIUM CHLORIDE, POTASSIUM CHLORIDE, SODIUM LACTATE AND CALCIUM CHLORIDE 30 ML/HR: 600; 310; 30; 20 INJECTION, SOLUTION INTRAVENOUS at 10:33

## 2023-02-06 RX ADMIN — PROPOFOL 150 MCG/KG/MIN: 10 INJECTION, EMULSION INTRAVENOUS at 11:09

## 2023-02-06 RX ADMIN — LIDOCAINE HYDROCHLORIDE 100 MG: 20 INJECTION, SOLUTION EPIDURAL; INFILTRATION; INTRACAUDAL; PERINEURAL at 11:09

## 2023-02-06 NOTE — ANESTHESIA PREPROCEDURE EVALUATION
Anesthesia Evaluation     Patient summary reviewed and Nursing notes reviewed   no history of anesthetic complications:  NPO Solid Status: > 8 hours  NPO Liquid Status: > 2 hours           Airway   Mallampati: III  Dental      Pulmonary    (+) COPD, shortness of breath, sleep apnea,   Cardiovascular   Exercise tolerance: good (4-7 METS)    (+) hypertension, hyperlipidemia,       Neuro/Psych- negative ROS  GI/Hepatic/Renal/Endo    (+)  GERD,  thyroid problem hypothyroidism    Musculoskeletal     Abdominal    Substance History - negative use     OB/GYN negative ob/gyn ROS         Other   arthritis,    history of cancer    ROS/Med Hx Other: PAT Nursing Notes unavailable.          ekg sinus bradycardia rbbb 3/8/21    Interpretation Summary echo 7/9/22    • Estimated left ventricular EF was in agreement with the calculated left ventricular EF. Left ventricular ejection fraction appears to be 61 - 65%. Left ventricular systolic function is normal.  • Left ventricular diastolic function is consistent with (grade Ia w/high LAP) impaired relaxation.  • No significant valvular abnormality is noted.      Latest Reference Range & Units Most Recent   Glucose 65 - 99 mg/dL 96  8/19/22 09:46   Sodium 136 - 145 mmol/L 137  8/19/22 09:46   Potassium 3.5 - 5.2 mmol/L 4.3  8/19/22 09:46   CO2 22.0 - 29.0 mmol/L 22.6  8/19/22 09:46   CO2 22 - 32 mmol/L 26  3/22/21 07:50   Chloride 98 - 107 mmol/L 105  8/19/22 09:46   Anion Gap 5.0 - 15.0 mmol/L 9.4  8/19/22 09:46   Creatinine 0.76 - 1.27 mg/dL 1.43 (H)  8/19/22 09:46   BUN 8 - 23 mg/dL 20  8/19/22 09:46   BUN/Creatinine Ratio 7.0 - 25.0  14.0  8/19/22 09:46   Calcium 8.6 - 10.5 mg/dL 9.3  8/19/22 09:46   eGFR >60.0 mL/min/1.73 51.4 (L)  8/19/22 09:46   eGFR Non African Am >60 mL/min/1.73 53 (L)  11/18/21 08:59   Alkaline Phosphatase 39 - 117 U/L 83  7/18/22 11:34   Total Protein 6.0 - 8.5 g/dL 7.2  7/18/22 11:34   ALT (SGPT) 1 - 41 U/L 26  7/18/22 11:34   AST (SGOT) 1 - 40 U/L  33  7/18/22 11:34   Total Bilirubin 0.0 - 1.2 mg/dL 0.7  7/18/22 11:34   Albumin 3.50 - 5.20 g/dL 4.20  7/18/22 11:34   Globulin gm/dL 3.0  7/18/22 11:34   (H): Data is abnormally high  (L): Data is abnormally low             Anesthesia Plan    ASA 3     general     (Total IV Anesthesia    Patient understands anesthesia not responsible for dental damage.  )  intravenous induction     Anesthetic plan, risks, benefits, and alternatives have been provided, discussed and informed consent has been obtained with: patient.    Plan discussed with CRNA.        CODE STATUS:

## 2023-02-06 NOTE — H&P
ScreeningPre Procedure History & Physical    Chief Complaint:   Screening     Subjective     HPI:   Screening     Past Medical History:   Past Medical History:   Diagnosis Date   • Arthritis    • COPD (chronic obstructive pulmonary disease) (HCC)    • Coronary artery disease involving native heart without angina pectoris 11/24/2021   • ETD (Eustachian tube dysfunction), bilateral    • GERD (gastroesophageal reflux disease)    • Gout    • H/O arthroscopy of shoulder 09/28/2017    aftercare following, right    • Hearing loss    • History of arthroscopy of right shoulder 01/11/2018   • HTN (hypertension)    • Hyperlipemia    • Hypertension, essential    • Hypothyroidism    • Limb swelling    • Lipoma    • Low back pain    • Melanoma (HCC)     arm   • Nasal septal ulcer    • Otalgia    • Personal history of colonic polyps 07/14/2014   • Recurrent epistaxis    • Recurrent otitis media    • Rotator cuff tear arthropathy of right shoulder 08/01/2017   • Seasonal allergies    • Sleep apnea    • Thyroid disorder    • Tinnitus, bilateral        Past Surgical History:  Past Surgical History:   Procedure Laterality Date   • CHOLECYSTECTOMY     • COLONOSCOPY  12/10/2019    DR. LANE   • ENDOSCOPY     • ENDOSCOPY N/A 09/20/2021    Procedure: ESOPHAGOGASTRODUODENOSCOPY with biopsy;  Surgeon: Mich Lane MD;  Location: [facility] ENDOSCOPY;  Service: Gastroenterology;  Laterality: N/A;  gastric polyps   • INTRAOCULAR LENS INSERTION      eye implant, yes    • LIPOMA EXCISION  02/26/2019    removal    • MYRINGOTOMY W/ TUBES      states had had several pairs placed by Dr. Kaba    • OTHER SURGICAL HISTORY Left 2013    melanoma excision from shoulder    • ROTATOR CUFF REPAIR     • TONSILLECTOMY     • UPPER GASTROINTESTINAL ENDOSCOPY         Family History:  Family History   Problem Relation Age of Onset   • Heart disease Father    • Heart attack Father 57        MI   • Diabetes Brother    • Colon cancer Other    • Malig  Hyperthermia Neg Hx        Social History:   reports that he quit smoking about 48 years ago. His smoking use included cigarettes. He quit smokeless tobacco use about 17 years ago.  His smokeless tobacco use included chew. He reports that he does not currently use alcohol. He reports that he does not use drugs.    Medications:   Medications Prior to Admission   Medication Sig Dispense Refill Last Dose   • allopurinol (ZYLOPRIM) 100 MG tablet Take 100 mg by mouth Every Night.   2/5/2023   • atorvastatin (LIPITOR) 40 MG tablet Take 1 tablet by mouth Daily. 90 tablet 3 2/5/2023   • fexofenadine (ALLEGRA) 180 MG tablet Take 180 mg by mouth Daily.   2/5/2023   • fluticasone (FLONASE) 50 MCG/ACT nasal spray 1 spray into the nostril(s) as directed by provider Every Night.   2/5/2023   • levothyroxine (SYNTHROID, LEVOTHROID) 137 MCG tablet Take 137 mcg by mouth Daily.   2/6/2023   • losartan (COZAAR) 50 MG tablet Take 1 tablet by mouth 2 (Two) Times a Day. 90 tablet 3 2/5/2023   • montelukast (SINGULAIR) 10 MG tablet Take 10 mg by mouth Every Night.   2/5/2023   • pantoprazole (PROTONIX) 40 MG EC tablet 40 mg Daily.   2/5/2023   • ascorbic acid (VITAMIN C) 500 MG capsule controlled-release CR capsule 500 mg Daily.   1/30/2023   • aspirin 81 MG EC tablet Take 1 tablet by mouth Every Other Day. (Patient taking differently: Take 81 mg by mouth Every Other Day. Last dose Monday night 1/30/23) 45 tablet 3 1/30/2023   • FeroSul 325 (65 Fe) MG tablet Take 1 tablet by mouth Every Other Day.   1/30/2023   • meloxicam (MOBIC) 15 MG tablet TAKE 1 TABLET BY MOUTH EVERY DAY (Patient taking differently: Take 7.5 mg by mouth Every Other Day.) 30 tablet 1 1/30/2023   • spironolactone-hydrochlorothiazide (ALDACTAZIDE) 25-25 MG tablet Take 1 tablet by mouth Every Other Day. 45 tablet 3 2/4/2023   • traZODone (DESYREL) 50 MG tablet Take 50 mg by mouth Every Night. 1-2 TABS   2/4/2023       Allergies:  Patient has no known  allergies.        Objective     Blood pressure 127/86, pulse 54, temperature 97.6 °F (36.4 °C), temperature source Temporal, resp. rate 20, weight 113 kg (249 lb 1.9 oz), SpO2 94 %.    Physical Exam   Constitutional: Pt is oriented to person, place, and time and well-developed, well-nourished, and in no distress.   Mouth/Throat: Oropharynx is clear and moist.   Neck: Normal range of motion.   Cardiovascular: Normal rate, regular rhythm and normal heart sounds.    Pulmonary/Chest: Effort normal and breath sounds normal.   Abdominal: Soft. Nontender  Skin: Skin is warm and dry.   Psychiatric: Mood, memory, affect and judgment normal.     Assessment & Plan     Diagnosis:  Screening colonoscopy  H/o colon polyps  Family  H/o colon cancer     Anticipated Surgical Procedure:  colonoscopy    The risks, benefits, and alternatives of this procedure have been discussed with the patient or the responsible party- the patient understands and agrees to proceed.

## 2023-02-06 NOTE — ANESTHESIA POSTPROCEDURE EVALUATION
Patient: Percy Velarde    Procedure Summary     Date: 02/06/23 Room / Location: [facility] ENDOSCOPY 2 / [facility] ENDOSCOPY    Anesthesia Start: 1108 Anesthesia Stop: 1144    Procedure: COLONOSCOPY WITH COLD SNARE POLYPECTOMY Diagnosis:       Family history of colon cancer      (Family history of colon cancer [Z80.0])    Surgeons: Mich Lane MD Provider: Lauro Reddy MD    Anesthesia Type: general ASA Status: 3          Anesthesia Type: general    Vitals  Vitals Value Taken Time   /64 02/06/23 1159   Temp 36.4 °C (97.6 °F) 02/06/23 1200   Pulse 55 02/06/23 1206   Resp 15 02/06/23 1200   SpO2 96 % 02/06/23 1206   Vitals shown include unvalidated device data.        Post Anesthesia Care and Evaluation    Patient location during evaluation: bedside  Patient participation: complete - patient participated  Level of consciousness: awake  Pain management: adequate    Airway patency: patent  Anesthetic complications: No anesthetic complications  PONV Status: none  Cardiovascular status: acceptable and stable  Respiratory status: acceptable  Hydration status: acceptable    Comments: An Anesthesiologist personally participated in the most demanding procedures (including induction and emergence if applicable) in the anesthesia plan, monitored the course of anesthesia administration at frequent intervals and remained physically present and available for immediate diagnosis and treatment of emergencies.

## 2023-02-06 NOTE — TELEPHONE ENCOUNTER
PATIENT WAS CALLED AND INFORMED THAT PER DR LOVELACE THAT HE COULD HOLD ASPIRIN FOR 5-7 DAYS PRIOR TO SURGERY.  DR HAN WOULD LIKE FOR HIM TO HOLD 7 DAYS PRIOR TO SURGERY.  PATIENT VOICES UNDERSTANDING.

## 2023-02-06 NOTE — TELEPHONE ENCOUNTER
Received call from orthopedics, patient is on aspirin- please give directive on holding patient aspirin.

## 2023-02-07 ENCOUNTER — PRE-ADMISSION TESTING (OUTPATIENT)
Dept: PREADMISSION TESTING | Facility: HOSPITAL | Age: 73
End: 2023-02-07
Payer: MEDICARE

## 2023-02-07 VITALS
DIASTOLIC BLOOD PRESSURE: 80 MMHG | WEIGHT: 248.46 LBS | HEART RATE: 60 BPM | BODY MASS INDEX: 36.8 KG/M2 | HEIGHT: 69 IN | OXYGEN SATURATION: 95 % | RESPIRATION RATE: 18 BRPM | TEMPERATURE: 98.3 F | SYSTOLIC BLOOD PRESSURE: 132 MMHG

## 2023-02-07 DIAGNOSIS — M17.0 BILATERAL PRIMARY OSTEOARTHRITIS OF KNEE: ICD-10-CM

## 2023-02-07 DIAGNOSIS — Z01.818 PRE-OP TESTING: ICD-10-CM

## 2023-02-07 LAB
ALBUMIN SERPL-MCNC: 4.3 G/DL (ref 3.5–5.2)
ALBUMIN/GLOB SERPL: 1.7 G/DL
ALP SERPL-CCNC: 85 U/L (ref 39–117)
ALT SERPL W P-5'-P-CCNC: 26 U/L (ref 1–41)
ANION GAP SERPL CALCULATED.3IONS-SCNC: 9.8 MMOL/L (ref 5–15)
AST SERPL-CCNC: 27 U/L (ref 1–40)
BASOPHILS # BLD AUTO: 0.05 10*3/MM3 (ref 0–0.2)
BASOPHILS NFR BLD AUTO: 0.6 % (ref 0–1.5)
BILIRUB SERPL-MCNC: 0.5 MG/DL (ref 0–1.2)
BUN SERPL-MCNC: 15 MG/DL (ref 8–23)
BUN/CREAT SERPL: 12.2 (ref 7–25)
BURR CELLS BLD QL SMEAR: NORMAL
CALCIUM SPEC-SCNC: 9.6 MG/DL (ref 8.6–10.5)
CHLORIDE SERPL-SCNC: 106 MMOL/L (ref 98–107)
CO2 SERPL-SCNC: 23.2 MMOL/L (ref 22–29)
CREAT SERPL-MCNC: 1.23 MG/DL (ref 0.76–1.27)
CYTO UR: NORMAL
DEPRECATED RDW RBC AUTO: 71.9 FL (ref 37–54)
EGFRCR SERPLBLD CKD-EPI 2021: 61.2 ML/MIN/1.73
EOSINOPHIL # BLD AUTO: 0.09 10*3/MM3 (ref 0–0.4)
EOSINOPHIL NFR BLD AUTO: 1.1 % (ref 0.3–6.2)
ERYTHROCYTE [DISTWIDTH] IN BLOOD BY AUTOMATED COUNT: 25.4 % (ref 12.3–15.4)
GLOBULIN UR ELPH-MCNC: 2.5 GM/DL
GLUCOSE SERPL-MCNC: 110 MG/DL (ref 65–99)
HBA1C MFR BLD: 5.3 % (ref 4.8–5.6)
HCT VFR BLD AUTO: 46.6 % (ref 37.5–51)
HGB BLD-MCNC: 15 G/DL (ref 13–17.7)
IMM GRANULOCYTES # BLD AUTO: 0.01 10*3/MM3 (ref 0–0.05)
IMM GRANULOCYTES NFR BLD AUTO: 0.1 % (ref 0–0.5)
INR PPP: 1.03 (ref 0.86–1.15)
LAB AP CASE REPORT: NORMAL
LAB AP CLINICAL INFORMATION: NORMAL
LARGE PLATELETS: NORMAL
LYMPHOCYTES # BLD AUTO: 1.2 10*3/MM3 (ref 0.7–3.1)
LYMPHOCYTES NFR BLD AUTO: 14.8 % (ref 19.6–45.3)
MCH RBC QN AUTO: 26.6 PG (ref 26.6–33)
MCHC RBC AUTO-ENTMCNC: 32.2 G/DL (ref 31.5–35.7)
MCV RBC AUTO: 82.6 FL (ref 79–97)
MONOCYTES # BLD AUTO: 0.84 10*3/MM3 (ref 0.1–0.9)
MONOCYTES NFR BLD AUTO: 10.3 % (ref 5–12)
NEUTROPHILS NFR BLD AUTO: 5.94 10*3/MM3 (ref 1.7–7)
NEUTROPHILS NFR BLD AUTO: 73.1 % (ref 42.7–76)
NRBC BLD AUTO-RTO: 0 /100 WBC (ref 0–0.2)
PATH REPORT.FINAL DX SPEC: NORMAL
PATH REPORT.GROSS SPEC: NORMAL
PLATELET # BLD AUTO: 170 10*3/MM3 (ref 140–450)
PMV BLD AUTO: 10.3 FL (ref 6–12)
POTASSIUM SERPL-SCNC: 4.1 MMOL/L (ref 3.5–5.2)
PROT SERPL-MCNC: 6.8 G/DL (ref 6–8.5)
PROTHROMBIN TIME: 13.6 SECONDS (ref 11.8–14.9)
RBC # BLD AUTO: 5.64 10*6/MM3 (ref 4.14–5.8)
SMALL PLATELETS BLD QL SMEAR: ADEQUATE
SODIUM SERPL-SCNC: 139 MMOL/L (ref 136–145)
WBC MORPH BLD: NORMAL
WBC NRBC COR # BLD: 8.13 10*3/MM3 (ref 3.4–10.8)

## 2023-02-07 PROCEDURE — 36415 COLL VENOUS BLD VENIPUNCTURE: CPT

## 2023-02-07 PROCEDURE — 83036 HEMOGLOBIN GLYCOSYLATED A1C: CPT

## 2023-02-07 PROCEDURE — 85610 PROTHROMBIN TIME: CPT

## 2023-02-07 PROCEDURE — 80053 COMPREHEN METABOLIC PANEL: CPT

## 2023-02-07 PROCEDURE — 85025 COMPLETE CBC W/AUTO DIFF WBC: CPT

## 2023-02-07 PROCEDURE — 93005 ELECTROCARDIOGRAM TRACING: CPT

## 2023-02-07 PROCEDURE — 85007 BL SMEAR W/DIFF WBC COUNT: CPT

## 2023-02-07 NOTE — DISCHARGE INSTRUCTIONS
IMPORTANT INSTRUCTIONS - PRE-ADMISSION TESTING  DO NOT EAT OR CHEW anything after midnight the night before your procedure.    You may have CLEAR liquids up to 3 hours prior to ARRIVAL time.   Take the following medications the morning of your procedure with JUST A SIP OF WATER:  Levothyroxine, Pantoprazole, Allergy Relief  DO NOT BRING your medications to the hospital with you, UNLESS something has changed since your PRE-Admission Testing appointment.  Hold all vitamins, supplements, and NSAIDS (Non- steroidal anti-inflammatory meds) for one week prior to surgery (you MAY take Tylenol or Acetaminophen).  Make sure you have a ride home and have someone who will stay with you the day of your procedure after you go home.  If you have any questions, please call your Pre-Admission Testing Nurse, Keely at [phone].   Per anesthesia request, do not smoke for 24 hours before your procedure or as instructed by your surgeon.     You will receive a call the Friday before surgery with an arrival time.    Clear Liquid Diet        Find out when you need to start a clear liquid diet.   Think of “clear liquids” as anything you could read a newspaper through. This includes things like water, broth, sports drinks, or tea WITHOUT any kind of milk or cream.           Once you are told to start a clear liquid diet, only drink these things until 3 hours before arrival to the hospital or when the hospital says to stop. Total volume limitation: 8 oz.       Clear liquids you CAN drink:   Water   Clear broth: beef, chicken, vegetable, or bone broth with nothing in it   Gatorade   Lemonade or Esequiel-aid   Soda   Tea, coffee (NO cream or honey)   Jell-O (without fruit)   Popsicles (without fruit or cream)   Italian ices   Juice without pulp: apple, white, grape   You may use salt, pepper, and sugar    Do NOT drink:   Milk or cream   Soy milk, almond milk, coconut milk, or other non-dairy drinks and   creamers   Milkshakes or smoothies    Tomato juice   Orange juice   Grapefruit juice   Cream soups or any other than broth         Clear Liquid Diet:  Do NOT eat any solid food.  Do NOT eat or suck on mints or candy.  Do NOT chew gum.  Do NOT drink thick liquids like milk or juice with pulp in it.  Do NOT add milk, cream, or anything like soy milk or almond milk to coffee or tea.

## 2023-02-08 ENCOUNTER — ANESTHESIA EVENT (OUTPATIENT)
Dept: PERIOP | Facility: HOSPITAL | Age: 73
End: 2023-02-08
Payer: MEDICARE

## 2023-02-11 NOTE — H&P
Chief Complaint  No chief complaint on file.     Subjective      Percy Velarde presents to [facility] for follow up of the right knee and initial evaluation of the right hip. Patient has osteoarthritis of bilateral knees. He has tried steroid packs, anti-inflammatory medication. He is having continued pain in the right knee. He now is also having pain in the right knee. He had a gel injection in bilateral knees earlier this year and that didn't help well.      No Known Allergies     Social History     Socioeconomic History   • Marital status:    Tobacco Use   • Smoking status: Former     Types: Cigarettes     Quit date:      Years since quittin.1   • Smokeless tobacco: Former     Types: Chew     Quit date:    • Tobacco comments:     QUIT    Vaping Use   • Vaping Use: Never used   Substance and Sexual Activity   • Alcohol use: Not Currently     Alcohol/week: 3.0 standard drinks     Types: 2 Cans of beer, 1 Shots of liquor per week   • Drug use: Never   • Sexual activity: Defer        Review of Systems     Objective   Vital Signs:   There were no vitals taken for this visit.      Physical Exam  Constitutional:       Appearance: Normal appearance. Patient is well-developed and normal weight.   HENT:      Head: Normocephalic.      Right Ear: Hearing and external ear normal.      Left Ear: Hearing and external ear normal.      Nose: Nose normal.   Eyes:      Conjunctiva/sclera: Conjunctivae normal.   Cardiovascular:      Rate and Rhythm: Normal rate.   Pulmonary:      Effort: Pulmonary effort is normal.      Breath sounds: No wheezing or rales.   Abdominal:      Palpations: Abdomen is soft.      Tenderness: There is no abdominal tenderness.   Musculoskeletal:      Cervical back: Normal range of motion.   Skin:     Findings: No rash.   Neurological:      Mental Status: Patient is alert and oriented to person, place, and time.   Psychiatric:         Mood and Affect: Mood and  affect normal.         Judgment: Judgment normal.       Ortho Exam      RIGHT KNEE Dorsal pedal pulse 2+. Posterior tibialis pulse is 2+. Good tone of hip flexors, hip extensors, and hip abductors. Calf supple. Sensation intact. Neurovascular Intact. Limping gait.  Calf supple.     RIGHT HIP Dorsal pedal pulse 2+. Posterior tibialis pulse is 2+. Good strength to hamstrings, quadriceps, dorsiflexors, and plantar flexors. No swelling. No skin discoloration or muscle atrophy.     Procedures      Imaging Results (Most Recent)     None           Result Review :     X-Ray Report:  Right hip X-Ray  Indication: Evaluation of the right hip  AP/Lateral and [place] view(s)  Findings: Minimal arthritis. Minimal osseous abnormality, no dislocation or fracture.   Prior studies available for comparison: No    X-Ray Report:  Right knee X-Ray  Indication: Evaluation of the right knee  AP/Lateral and [place] view(s)  Findings: Severe arthritis.  Moderate osseous abnormality, no dislocation or fracture.   Prior studies available for comparison: No            Assessment and Plan     There are no diagnoses linked to this encounter.    Discussed the treatment plan with the patient. Discussed conservative measures as exercises, anti-inflammatory and injection. He is getting an IM injection to the right hip secondary to compensation of the right knee. Discussed the treatment options with the patient, operative vs non-operative. Discussed right total knee arthroplasty and patient understands the risks and benefits and willing to proceed.     Discussed surgery., Risks/benefits discussed with patient including, but not limited to: infection, bleeding, neurovascular damage, malunion, nonunion, aesthetic deformity, need for further surgery, and death., Discussed with patient the implant type being used during surgery and patient understands and desires to proceed., Surgery pamphlet given. and Call or return if worsening symptoms.    Follow Up      Postoperatively     I have personally performed the services described in this document as scribed by the above individual and it is both accurate and complete. Bao Vigil MD 02/11/23

## 2023-02-13 ENCOUNTER — ANESTHESIA (OUTPATIENT)
Dept: PERIOP | Facility: HOSPITAL | Age: 73
End: 2023-02-13
Payer: MEDICARE

## 2023-02-13 ENCOUNTER — APPOINTMENT (OUTPATIENT)
Dept: GENERAL RADIOLOGY | Facility: HOSPITAL | Age: 73
End: 2023-02-13
Payer: MEDICARE

## 2023-02-13 ENCOUNTER — HOSPITAL ENCOUNTER (OUTPATIENT)
Facility: HOSPITAL | Age: 73
Discharge: HOME HEALTH | End: 2023-02-14
Attending: ORTHOPAEDIC SURGERY | Admitting: ORTHOPAEDIC SURGERY
Payer: MEDICARE

## 2023-02-13 DIAGNOSIS — M17.0 BILATERAL PRIMARY OSTEOARTHRITIS OF KNEE: ICD-10-CM

## 2023-02-13 DIAGNOSIS — R26.2 DIFFICULTY IN WALKING: Primary | ICD-10-CM

## 2023-02-13 DIAGNOSIS — M17.9 OSTEOARTHRITIS OF KNEE, UNSPECIFIED LATERALITY, UNSPECIFIED OSTEOARTHRITIS TYPE: ICD-10-CM

## 2023-02-13 DIAGNOSIS — Z78.9 DECREASED ACTIVITIES OF DAILY LIVING (ADL): ICD-10-CM

## 2023-02-13 PROCEDURE — 63710000001 OXYCODONE 5 MG TABLET: Performed by: NURSE ANESTHETIST, CERTIFIED REGISTERED

## 2023-02-13 PROCEDURE — A9270 NON-COVERED ITEM OR SERVICE: HCPCS | Performed by: ANESTHESIOLOGY

## 2023-02-13 PROCEDURE — C1776 JOINT DEVICE (IMPLANTABLE): HCPCS | Performed by: ORTHOPAEDIC SURGERY

## 2023-02-13 PROCEDURE — 27447 TOTAL KNEE ARTHROPLASTY: CPT | Performed by: ORTHOPAEDIC SURGERY

## 2023-02-13 PROCEDURE — 63710000001 FAMOTIDINE 20 MG TABLET: Performed by: ORTHOPAEDIC SURGERY

## 2023-02-13 PROCEDURE — 94799 UNLISTED PULMONARY SVC/PX: CPT

## 2023-02-13 PROCEDURE — 25010000002 KETOROLAC TROMETHAMINE PER 15 MG: Performed by: ORTHOPAEDIC SURGERY

## 2023-02-13 PROCEDURE — A9270 NON-COVERED ITEM OR SERVICE: HCPCS | Performed by: ORTHOPAEDIC SURGERY

## 2023-02-13 PROCEDURE — 63710000001 ACETAMINOPHEN 500 MG TABLET: Performed by: ORTHOPAEDIC SURGERY

## 2023-02-13 PROCEDURE — A9270 NON-COVERED ITEM OR SERVICE: HCPCS | Performed by: NURSE ANESTHETIST, CERTIFIED REGISTERED

## 2023-02-13 PROCEDURE — 25010000002 DEXAMETHASONE PER 1 MG: Performed by: NURSE ANESTHETIST, CERTIFIED REGISTERED

## 2023-02-13 PROCEDURE — 63710000001 HYDROCODONE-ACETAMINOPHEN 7.5-325 MG TABLET: Performed by: ORTHOPAEDIC SURGERY

## 2023-02-13 PROCEDURE — 25010000002 CEFAZOLIN IN DEXTROSE 2-4 GM/100ML-% SOLUTION: Performed by: ORTHOPAEDIC SURGERY

## 2023-02-13 PROCEDURE — 63710000001 SENNOSIDES-DOCUSATE 8.6-50 MG TABLET: Performed by: ORTHOPAEDIC SURGERY

## 2023-02-13 PROCEDURE — 63710000001 ACETAMINOPHEN 500 MG TABLET: Performed by: ANESTHESIOLOGY

## 2023-02-13 PROCEDURE — 25010000002 PROPOFOL 10 MG/ML EMULSION: Performed by: NURSE ANESTHETIST, CERTIFIED REGISTERED

## 2023-02-13 PROCEDURE — 99203 OFFICE O/P NEW LOW 30 MIN: CPT | Performed by: PHYSICIAN ASSISTANT

## 2023-02-13 PROCEDURE — 25010000002 MIDAZOLAM PER 1 MG: Performed by: ANESTHESIOLOGY

## 2023-02-13 PROCEDURE — 25010000002 CEFAZOLIN IN DEXTROSE 2-4 GM/100ML-% SOLUTION: Performed by: INTERNAL MEDICINE

## 2023-02-13 PROCEDURE — C1713 ANCHOR/SCREW BN/BN,TIS/BN: HCPCS | Performed by: ORTHOPAEDIC SURGERY

## 2023-02-13 PROCEDURE — 25010000002 FENTANYL CITRATE (PF) 50 MCG/ML SOLUTION: Performed by: NURSE ANESTHETIST, CERTIFIED REGISTERED

## 2023-02-13 PROCEDURE — 25010000002 ROPIVACAINE PER 1 MG: Performed by: ORTHOPAEDIC SURGERY

## 2023-02-13 PROCEDURE — 25010000002 HYDROMORPHONE 1 MG/ML SOLUTION: Performed by: NURSE ANESTHETIST, CERTIFIED REGISTERED

## 2023-02-13 PROCEDURE — 25010000002 ROPIVACAINE PER 1 MG: Performed by: ANESTHESIOLOGY

## 2023-02-13 PROCEDURE — 25010000002 MORPHINE PER 10 MG: Performed by: ORTHOPAEDIC SURGERY

## 2023-02-13 PROCEDURE — 25010000002 ONDANSETRON PER 1 MG: Performed by: NURSE ANESTHETIST, CERTIFIED REGISTERED

## 2023-02-13 PROCEDURE — 97161 PT EVAL LOW COMPLEX 20 MIN: CPT

## 2023-02-13 PROCEDURE — 73560 X-RAY EXAM OF KNEE 1 OR 2: CPT

## 2023-02-13 PROCEDURE — 25010000002 EPINEPHRINE 1 MG/ML SOLUTION: Performed by: ORTHOPAEDIC SURGERY

## 2023-02-13 DEVICE — BEAR TIB/KN VANGUARD AS 12X79MM NS: Type: IMPLANTABLE DEVICE | Site: KNEE | Status: FUNCTIONAL

## 2023-02-13 DEVICE — CMT BONE PALACOS R HI/VISC 1X40: Type: IMPLANTABLE DEVICE | Site: KNEE | Status: FUNCTIONAL

## 2023-02-13 DEVICE — TRY TIB CRUC 79MM: Type: IMPLANTABLE DEVICE | Site: KNEE | Status: FUNCTIONAL

## 2023-02-13 DEVICE — PAT ARCOM W/WIRE 3PEG 34MM: Type: IMPLANTABLE DEVICE | Site: KNEE | Status: FUNCTIONAL

## 2023-02-13 DEVICE — CAP TOTL KN CMT PRIMARY: Type: IMPLANTABLE DEVICE | Site: KNEE | Status: FUNCTIONAL

## 2023-02-13 DEVICE — COMP FEM/KN VANGUARD INTLK CR 70MM NS RT: Type: IMPLANTABLE DEVICE | Site: KNEE | Status: FUNCTIONAL

## 2023-02-13 RX ORDER — KETOROLAC TROMETHAMINE 30 MG/ML
15 INJECTION, SOLUTION INTRAMUSCULAR; INTRAVENOUS EVERY 6 HOURS SCHEDULED
Status: COMPLETED | OUTPATIENT
Start: 2023-02-13 | End: 2023-02-14

## 2023-02-13 RX ORDER — FERROUS SULFATE 325(65) MG
325 TABLET ORAL
Status: DISCONTINUED | OUTPATIENT
Start: 2023-02-14 | End: 2023-02-14 | Stop reason: HOSPADM

## 2023-02-13 RX ORDER — SODIUM CHLORIDE, SODIUM LACTATE, POTASSIUM CHLORIDE, CALCIUM CHLORIDE 600; 310; 30; 20 MG/100ML; MG/100ML; MG/100ML; MG/100ML
9 INJECTION, SOLUTION INTRAVENOUS CONTINUOUS PRN
Status: DISCONTINUED | OUTPATIENT
Start: 2023-02-13 | End: 2023-02-14 | Stop reason: HOSPADM

## 2023-02-13 RX ORDER — FAMOTIDINE 20 MG/1
40 TABLET, FILM COATED ORAL DAILY
Status: DISCONTINUED | OUTPATIENT
Start: 2023-02-13 | End: 2023-02-14 | Stop reason: HOSPADM

## 2023-02-13 RX ORDER — TRANEXAMIC ACID 10 MG/ML
1000 INJECTION, SOLUTION INTRAVENOUS ONCE
Status: COMPLETED | OUTPATIENT
Start: 2023-02-13 | End: 2023-02-13

## 2023-02-13 RX ORDER — ROPIVACAINE HYDROCHLORIDE 5 MG/ML
INJECTION, SOLUTION EPIDURAL; INFILTRATION; PERINEURAL
Status: COMPLETED | OUTPATIENT
Start: 2023-02-13 | End: 2023-02-13

## 2023-02-13 RX ORDER — PROMETHAZINE HYDROCHLORIDE 25 MG/1
25 SUPPOSITORY RECTAL ONCE AS NEEDED
Status: DISCONTINUED | OUTPATIENT
Start: 2023-02-13 | End: 2023-02-13

## 2023-02-13 RX ORDER — ONDANSETRON 4 MG/1
4 TABLET, FILM COATED ORAL EVERY 6 HOURS PRN
Status: DISCONTINUED | OUTPATIENT
Start: 2023-02-13 | End: 2023-02-14 | Stop reason: HOSPADM

## 2023-02-13 RX ORDER — FENTANYL CITRATE 50 UG/ML
INJECTION, SOLUTION INTRAMUSCULAR; INTRAVENOUS AS NEEDED
Status: DISCONTINUED | OUTPATIENT
Start: 2023-02-13 | End: 2023-02-13 | Stop reason: SURG

## 2023-02-13 RX ORDER — ESMOLOL HYDROCHLORIDE 10 MG/ML
INJECTION INTRAVENOUS AS NEEDED
Status: DISCONTINUED | OUTPATIENT
Start: 2023-02-13 | End: 2023-02-13 | Stop reason: SURG

## 2023-02-13 RX ORDER — DEXAMETHASONE SODIUM PHOSPHATE 4 MG/ML
INJECTION, SOLUTION INTRA-ARTICULAR; INTRALESIONAL; INTRAMUSCULAR; INTRAVENOUS; SOFT TISSUE AS NEEDED
Status: DISCONTINUED | OUTPATIENT
Start: 2023-02-13 | End: 2023-02-13 | Stop reason: SURG

## 2023-02-13 RX ORDER — NALOXONE HCL 0.4 MG/ML
0.4 VIAL (ML) INJECTION
Status: DISCONTINUED | OUTPATIENT
Start: 2023-02-13 | End: 2023-02-14 | Stop reason: HOSPADM

## 2023-02-13 RX ORDER — GLYCOPYRROLATE 0.2 MG/ML
0.2 INJECTION INTRAMUSCULAR; INTRAVENOUS
Status: COMPLETED | OUTPATIENT
Start: 2023-02-13 | End: 2023-02-13

## 2023-02-13 RX ORDER — LIDOCAINE HYDROCHLORIDE 20 MG/ML
INJECTION, SOLUTION EPIDURAL; INFILTRATION; INTRACAUDAL; PERINEURAL AS NEEDED
Status: DISCONTINUED | OUTPATIENT
Start: 2023-02-13 | End: 2023-02-13 | Stop reason: SURG

## 2023-02-13 RX ORDER — PROMETHAZINE HYDROCHLORIDE 12.5 MG/1
25 TABLET ORAL ONCE AS NEEDED
Status: DISCONTINUED | OUTPATIENT
Start: 2023-02-13 | End: 2023-02-13

## 2023-02-13 RX ORDER — ACETAMINOPHEN 500 MG
1000 TABLET ORAL ONCE
Status: COMPLETED | OUTPATIENT
Start: 2023-02-13 | End: 2023-02-13

## 2023-02-13 RX ORDER — SODIUM CHLORIDE, SODIUM LACTATE, POTASSIUM CHLORIDE, CALCIUM CHLORIDE 600; 310; 30; 20 MG/100ML; MG/100ML; MG/100ML; MG/100ML
100 INJECTION, SOLUTION INTRAVENOUS CONTINUOUS
Status: DISCONTINUED | OUTPATIENT
Start: 2023-02-13 | End: 2023-02-14 | Stop reason: HOSPADM

## 2023-02-13 RX ORDER — LABETALOL HYDROCHLORIDE 5 MG/ML
INJECTION, SOLUTION INTRAVENOUS AS NEEDED
Status: DISCONTINUED | OUTPATIENT
Start: 2023-02-13 | End: 2023-02-13 | Stop reason: SURG

## 2023-02-13 RX ORDER — CEFAZOLIN SODIUM 2 G/100ML
2 INJECTION, SOLUTION INTRAVENOUS EVERY 8 HOURS
Status: DISCONTINUED | OUTPATIENT
Start: 2023-02-13 | End: 2023-02-13

## 2023-02-13 RX ORDER — MEPERIDINE HYDROCHLORIDE 25 MG/ML
12.5 INJECTION INTRAMUSCULAR; INTRAVENOUS; SUBCUTANEOUS
Status: DISCONTINUED | OUTPATIENT
Start: 2023-02-13 | End: 2023-02-13

## 2023-02-13 RX ORDER — MIDAZOLAM HYDROCHLORIDE 1 MG/ML
2 INJECTION INTRAMUSCULAR; INTRAVENOUS ONCE
Status: COMPLETED | OUTPATIENT
Start: 2023-02-13 | End: 2023-02-13

## 2023-02-13 RX ORDER — ROCURONIUM BROMIDE 10 MG/ML
INJECTION, SOLUTION INTRAVENOUS AS NEEDED
Status: DISCONTINUED | OUTPATIENT
Start: 2023-02-13 | End: 2023-02-13 | Stop reason: SURG

## 2023-02-13 RX ORDER — CEFAZOLIN SODIUM 2 G/100ML
2 INJECTION, SOLUTION INTRAVENOUS EVERY 8 HOURS
Status: COMPLETED | OUTPATIENT
Start: 2023-02-13 | End: 2023-02-14

## 2023-02-13 RX ORDER — CEFAZOLIN SODIUM 2 G/100ML
2 INJECTION, SOLUTION INTRAVENOUS ONCE
Status: COMPLETED | OUTPATIENT
Start: 2023-02-13 | End: 2023-02-13

## 2023-02-13 RX ORDER — HYDROCODONE BITARTRATE AND ACETAMINOPHEN 7.5; 325 MG/1; MG/1
2 TABLET ORAL EVERY 4 HOURS PRN
Status: DISCONTINUED | OUTPATIENT
Start: 2023-02-13 | End: 2023-02-14 | Stop reason: HOSPADM

## 2023-02-13 RX ORDER — ONDANSETRON 2 MG/ML
4 INJECTION INTRAMUSCULAR; INTRAVENOUS EVERY 6 HOURS PRN
Status: DISCONTINUED | OUTPATIENT
Start: 2023-02-13 | End: 2023-02-14 | Stop reason: HOSPADM

## 2023-02-13 RX ORDER — ENOXAPARIN SODIUM 100 MG/ML
40 INJECTION SUBCUTANEOUS DAILY
Status: DISCONTINUED | OUTPATIENT
Start: 2023-02-14 | End: 2023-02-14 | Stop reason: HOSPADM

## 2023-02-13 RX ORDER — PROPOFOL 10 MG/ML
VIAL (ML) INTRAVENOUS AS NEEDED
Status: DISCONTINUED | OUTPATIENT
Start: 2023-02-13 | End: 2023-02-13 | Stop reason: SURG

## 2023-02-13 RX ORDER — ONDANSETRON 2 MG/ML
INJECTION INTRAMUSCULAR; INTRAVENOUS AS NEEDED
Status: DISCONTINUED | OUTPATIENT
Start: 2023-02-13 | End: 2023-02-13 | Stop reason: SURG

## 2023-02-13 RX ORDER — OXYCODONE HYDROCHLORIDE 5 MG/1
5 TABLET ORAL
Status: DISCONTINUED | OUTPATIENT
Start: 2023-02-13 | End: 2023-02-13

## 2023-02-13 RX ORDER — ONDANSETRON 2 MG/ML
4 INJECTION INTRAMUSCULAR; INTRAVENOUS ONCE AS NEEDED
Status: DISCONTINUED | OUTPATIENT
Start: 2023-02-13 | End: 2023-02-13

## 2023-02-13 RX ORDER — ACETAMINOPHEN 500 MG
1000 TABLET ORAL EVERY 8 HOURS
Status: DISCONTINUED | OUTPATIENT
Start: 2023-02-13 | End: 2023-02-14 | Stop reason: HOSPADM

## 2023-02-13 RX ORDER — EPHEDRINE SULFATE 50 MG/ML
INJECTION, SOLUTION INTRAVENOUS AS NEEDED
Status: DISCONTINUED | OUTPATIENT
Start: 2023-02-13 | End: 2023-02-13 | Stop reason: SURG

## 2023-02-13 RX ORDER — HYDROCODONE BITARTRATE AND ACETAMINOPHEN 7.5; 325 MG/1; MG/1
1 TABLET ORAL EVERY 4 HOURS PRN
Status: DISCONTINUED | OUTPATIENT
Start: 2023-02-13 | End: 2023-02-14 | Stop reason: HOSPADM

## 2023-02-13 RX ORDER — AMOXICILLIN 250 MG
2 CAPSULE ORAL 2 TIMES DAILY
Status: DISCONTINUED | OUTPATIENT
Start: 2023-02-13 | End: 2023-02-14 | Stop reason: HOSPADM

## 2023-02-13 RX ORDER — CEFAZOLIN SODIUM IN 0.9 % NACL 3 G/100 ML
3 INTRAVENOUS SOLUTION, PIGGYBACK (ML) INTRAVENOUS ONCE
Status: DISCONTINUED | OUTPATIENT
Start: 2023-02-13 | End: 2023-02-13

## 2023-02-13 RX ORDER — MAGNESIUM HYDROXIDE 1200 MG/15ML
LIQUID ORAL AS NEEDED
Status: DISCONTINUED | OUTPATIENT
Start: 2023-02-13 | End: 2023-02-13 | Stop reason: HOSPADM

## 2023-02-13 RX ADMIN — HYDROMORPHONE HYDROCHLORIDE 0.25 MG: 1 INJECTION, SOLUTION INTRAMUSCULAR; INTRAVENOUS; SUBCUTANEOUS at 14:30

## 2023-02-13 RX ADMIN — CEFAZOLIN SODIUM 2 G: 2 INJECTION, SOLUTION INTRAVENOUS at 20:13

## 2023-02-13 RX ADMIN — ACETAMINOPHEN 1000 MG: 500 TABLET ORAL at 16:15

## 2023-02-13 RX ADMIN — LABETALOL 20 MG/4 ML (5 MG/ML) INTRAVENOUS SYRINGE 7.5 MG: at 13:09

## 2023-02-13 RX ADMIN — LIDOCAINE HYDROCHLORIDE 100 MG: 20 INJECTION, SOLUTION EPIDURAL; INFILTRATION; INTRACAUDAL; PERINEURAL at 12:19

## 2023-02-13 RX ADMIN — ACETAMINOPHEN 1000 MG: 500 TABLET ORAL at 10:10

## 2023-02-13 RX ADMIN — KETOROLAC TROMETHAMINE 15 MG: 30 INJECTION, SOLUTION INTRAMUSCULAR; INTRAVENOUS at 23:06

## 2023-02-13 RX ADMIN — SUGAMMADEX 200 MG: 100 INJECTION, SOLUTION INTRAVENOUS at 13:49

## 2023-02-13 RX ADMIN — SENNOSIDES AND DOCUSATE SODIUM 2 TABLET: 8.6; 5 TABLET ORAL at 20:13

## 2023-02-13 RX ADMIN — ESMOLOL HYDROCHLORIDE 10 MG: 10 INJECTION INTRAVENOUS at 12:48

## 2023-02-13 RX ADMIN — HYDROCODONE BITARTRATE AND ACETAMINOPHEN 1 TABLET: 7.5; 325 TABLET ORAL at 21:37

## 2023-02-13 RX ADMIN — SODIUM CHLORIDE, POTASSIUM CHLORIDE, SODIUM LACTATE AND CALCIUM CHLORIDE: 600; 310; 30; 20 INJECTION, SOLUTION INTRAVENOUS at 13:02

## 2023-02-13 RX ADMIN — HYDROMORPHONE HYDROCHLORIDE 0.5 MG: 1 INJECTION, SOLUTION INTRAMUSCULAR; INTRAVENOUS; SUBCUTANEOUS at 14:11

## 2023-02-13 RX ADMIN — CEFAZOLIN SODIUM 2 G: 2 INJECTION, SOLUTION INTRAVENOUS at 12:17

## 2023-02-13 RX ADMIN — ROPIVACAINE HYDROCHLORIDE 30 ML: 5 INJECTION, SOLUTION EPIDURAL; INFILTRATION; PERINEURAL at 12:04

## 2023-02-13 RX ADMIN — KETOROLAC TROMETHAMINE 15 MG: 30 INJECTION, SOLUTION INTRAMUSCULAR; INTRAVENOUS at 18:02

## 2023-02-13 RX ADMIN — TRANEXAMIC ACID 1000 MG: 10 INJECTION, SOLUTION INTRAVENOUS at 13:34

## 2023-02-13 RX ADMIN — TRANEXAMIC ACID 1000 MG: 10 INJECTION, SOLUTION INTRAVENOUS at 11:21

## 2023-02-13 RX ADMIN — OXYCODONE 5 MG: 5 TABLET ORAL at 14:05

## 2023-02-13 RX ADMIN — DEXAMETHASONE SODIUM PHOSPHATE 4 MG: 4 INJECTION, SOLUTION INTRA-ARTICULAR; INTRALESIONAL; INTRAMUSCULAR; INTRAVENOUS; SOFT TISSUE at 12:35

## 2023-02-13 RX ADMIN — TRANEXAMIC ACID 1000 MG: 10 INJECTION, SOLUTION INTRAVENOUS at 12:15

## 2023-02-13 RX ADMIN — FAMOTIDINE 40 MG: 20 TABLET, FILM COATED ORAL at 16:15

## 2023-02-13 RX ADMIN — HYDROMORPHONE HYDROCHLORIDE 0.5 MG: 1 INJECTION, SOLUTION INTRAMUSCULAR; INTRAVENOUS; SUBCUTANEOUS at 14:05

## 2023-02-13 RX ADMIN — GLYCOPYRROLATE 0.2 MG: 0.2 INJECTION INTRAMUSCULAR; INTRAVENOUS at 11:19

## 2023-02-13 RX ADMIN — SODIUM CHLORIDE, POTASSIUM CHLORIDE, SODIUM LACTATE AND CALCIUM CHLORIDE 100 ML/HR: 600; 310; 30; 20 INJECTION, SOLUTION INTRAVENOUS at 16:16

## 2023-02-13 RX ADMIN — SODIUM CHLORIDE, POTASSIUM CHLORIDE, SODIUM LACTATE AND CALCIUM CHLORIDE 9 ML/HR: 600; 310; 30; 20 INJECTION, SOLUTION INTRAVENOUS at 10:07

## 2023-02-13 RX ADMIN — PROPOFOL 180 MG: 10 INJECTION, EMULSION INTRAVENOUS at 12:19

## 2023-02-13 RX ADMIN — ROCURONIUM BROMIDE 50 MG: 10 INJECTION, SOLUTION INTRAVENOUS at 12:19

## 2023-02-13 RX ADMIN — FENTANYL CITRATE 100 MCG: 50 INJECTION, SOLUTION INTRAMUSCULAR; INTRAVENOUS at 12:23

## 2023-02-13 RX ADMIN — MIDAZOLAM HYDROCHLORIDE 2 MG: 2 INJECTION, SOLUTION INTRAMUSCULAR; INTRAVENOUS at 11:19

## 2023-02-13 RX ADMIN — ONDANSETRON 4 MG: 2 INJECTION INTRAMUSCULAR; INTRAVENOUS at 12:35

## 2023-02-13 RX ADMIN — EPHEDRINE SULFATE 10 MG: 50 INJECTION INTRAVENOUS at 12:35

## 2023-02-13 NOTE — PLAN OF CARE
Goal Outcome Evaluation:          Patient has been calm and cooperative since coming from PACU. Has reported pain to be at a manageable level. Educated on when to request pain medication. Requires 2 person assistance when ambulating. Anticipating discharge tomorrow.

## 2023-02-13 NOTE — ANESTHESIA PROCEDURE NOTES
Peripheral Block    Pre-sedation assessment completed: 2/13/2023 12:04 PM    Patient reassessed immediately prior to procedure    Patient location during procedure: pre-op  Start time: 2/13/2023 12:04 PM  Stop time: 2/13/2023 12:10 PM  Reason for block: at surgeon's request and post-op pain management  Performed by  Anesthesiologist: Reyes, Mirabelle, DO  Assisted by: Jaqui Craven RN  Preanesthetic Checklist  Completed: patient identified, IV checked, site marked, risks and benefits discussed, surgical consent, monitors and equipment checked, pre-op evaluation and timeout performed  Prep:  Pt Position: supine  Sterile barriers:alcohol skin prep, partial drape, cap, washed/disinfected hands, mask and gloves  Prep: ChloraPrep  Patient monitoring: blood pressure monitoring, continuous pulse oximetry and EKG  Procedure    Sedation: yes  Performed under: local infiltration  Guidance:ultrasound guided and nerve stimulator    ULTRASOUND INTERPRETATION.  Using ultrasound guidance a 20 G gauge needle was placed in close proximity to the femoral nerve, at which point, under ultrasound guidance anesthetic was injected in the area of the nerve and spread of the anesthesia was seen on ultrasound in close proximity thereto.  There were no abnormalities seen on ultrasound; a digital image was taken; and the patient tolerated the procedure with no complications. Images:still images obtained, printed/placed on chart    Laterality:right  Block Type:adductor canal block  Injection Technique:single-shot  Needle Type:echogenic  Needle Gauge:20 G (4in)  Resistance on Injection: none    Medications Used: ropivacaine (NAROPIN) 0.5 % injection - Injection, Femoral   30 mL - 2/13/2023 12:04:00 PM      Post Assessment  Injection Assessment: negative aspiration for heme, no paresthesia on injection and incremental injection  Patient Tolerance:comfortable throughout block  Complications:no  Additional Notes  The block or continuous  infusion is requested by the referring physician for management of postoperative pain, or pain related to a procedure. Ultrasound guidance (deemed medically necessary). Painless injection, pt was awake and conversant during the procedure without complications. Needle and surrounding structures visualized throughout procedure. No adverse reactions or complications seen during this period. Post-procedure image showed no signs of complication, and anatomy was consistent with an uncomplicated nerve blockade.

## 2023-02-13 NOTE — CONSULTS
Discharge Planning Assessment  GAVIN Marie     Patient Name: Percy Velarde  MRN: [id number]  Today's Date: 2/13/2023    Admit Date: 2/13/2023    Plan: Patient is alert and oriented, spoke with patient's daughters regarding discharge planning. Patient is set to discharge tomorrow and will be returning home with family and home health. Patient is to have [facility] come out to his home for therapy needs. Patient has a walker but needs a 3 in 1 and would like a transfer bench as well. Daughter stated they are aware they will likely have to pay out of pocket for the transfer bench. Will update [facility]. Will also follow up with [facility]. No other needs identified. Informed daughter that if any additonal needs arise, discharge planning will follow up accordingly.   Discharge Needs Assessment     Row Name 02/13/23 1541       Living Environment    People in Home alone    Primary Care Provided by self    Provides Primary Care For no one    Family Caregiver if Needed child(sridevi), adult    Quality of Family Relationships helpful;involved;supportive    Able to Return to Prior Arrangements yes       Resource/Environmental Concerns    Transportation Concerns none       Food Insecurity    Within the past 12 months, you worried that your food would run out before you got the money to buy more. Never true    Within the past 12 months, the food you bought just didn't last and you didn't have money to get more. Never true       Discharge Needs Assessment    Readmission Within the Last 30 Days no previous admission in last 30 days    Equipment Currently Used at Home walker, standard    Concerns to be Addressed discharge planning    Anticipated Changes Related to Illness none    Equipment Needed After Discharge other (see comments)  transfer bench and 3 in 1    Discharge Facility/Level of Care Needs home with home health               Discharge Plan     Row Name 02/13/23 4426       Plan    Plan Patient is alert and oriented, spoke  with patient's daughters regarding discharge planning. Patient is set to discharge tomorrow and will be returning home with family and home health. Patient is to have [facility] come out to his home for therapy needs. Patient has a walker but needs a 3 in 1 and would like a transfer bench as well. Daughter stated they are aware they will likely have to pay out of pocket for the transfer bench. Will update [facility]. Will also follow up with [facility]. No other needs identified. Informed daughter that if any additonal needs arise, discharge planning will follow up accordingly.              Continued Care and Services - Admitted Since 2/13/2023     Home Medical Care     Service Provider Request Status Selected Services Address Phone Fax Patient Preferred    [facility] Pending - Request Sent N/A [id number], [place] [address] [phone] --                 Demographic Summary     Row Name 02/13/23 1540       General Information    Admission Type same day    Arrived From operating room    Reason for Consult discharge planning    Preferred Language English               Functional Status     Row Name 02/13/23 1541       Functional Status    Usual Activity Tolerance good    Current Activity Tolerance good       Assessment of Health Literacy    How often do you have someone help you read hospital materials? Never    How often do you have problems learning about your medical condition because of difficulty understanding written information? Never    How often do you have a problem understanding what is told to you about your medical condition? Never    How confident are you filling out medical forms by yourself? Quite a bit    Health Literacy Good       Functional Status, IADL    Medications independent    Meal Preparation independent    Housekeeping independent    Laundry independent    Shopping independent       Mental Status    General Appearance WDL WDL       Mental  Status Summary    Recent Changes in Mental Status/Cognitive Functioning no changes               Psychosocial    No documentation.                Abuse/Neglect    No documentation.                Legal    No documentation.                Substance Abuse    No documentation.                Patient Forms    No documentation.                   Jorgito Kern RN

## 2023-02-13 NOTE — OP NOTE
TOTAL KNEE ARTHROPLASTY  Procedure Report    Patient Name:  Percy Velarde  YOB: 1950    Date of Surgery:  2/13/2023     Indications:  Severe OA, failed conservative treatment    Pre-op Diagnosis:   Bilateral primary osteoarthritis of knee [M17.0]       Post-Op Diagnosis Codes:     * Bilateral primary osteoarthritis of knee [M17.0]    Procedure/CPT® Codes:      Procedure(s):  RIGHT TOTAL KNEE ARTHROPLASTY.    Surgical Approach: Knee Medial Parapatellar        Staff:  Surgeon(s):  Bao Vigil MD    Assistant: Yue Mrain    Anesthesia: General with Block    Estimated Blood Loss: 25 mL    Implants:    Implant Name Type Inv. Item Serial No.  Lot No. LRB No. Used Action   CMT BONE PALACOS R HI/VISC 1X40 - [id number] Implant CMT BONE PALACOS R HI/VISC 1X40  [facility] [id number] Right 1 Implanted   CMT BONE PALACOS R HI/VISC 1X40 - [id number] Implant CMT BONE PALACOS R HI/VISC 1X40  [facility] [id number] Right 1 Implanted   COMP FEM/KN VANGUARD INTLK CR 70MM NS RT - [id number] Implant COMP FEM/KN VANGUARD INTLK CR 70MM NS RT  [employer] [id number] Right 1 Implanted   PAT ARCOM W/WIRE 3PEG 34MM - [id number] Implant PAT ARCOM W/WIRE 3PEG 34MM  [employer] [id number] Right 1 Implanted   TRY TIB CRUC 79MM - [id number] Implant TRY TIB CRUC 79MM  [employer] [id number] Right 1 Implanted   BEAR TIB/KN VANGUARD AS [id number] NS - [id number] Implant BEAR TIB/KN VANGUARD AS [id number] NS  [employer] [id number] Right 1 Implanted       Specimen:          None        Findings: advance OA    Complications: None    Description of Procedure: The patient was brought to the operating room and underwent general anesthesia, had a preoperative antibiotic, and was then prepped and draped in sterile fashion. An Esmarch was used to exsanguinate the limb. The tourniquet was then inflated. A standard medial parapatellar arthrotomy was performed. An intramedullary guide was placed in the femur. A distal femoral  cut was made and measured. The 4-in-1 block was placed. Excellent cuts were achieved. Bone was removed, followed by removal of the ACL and remaining menisci. The intramedullary guide was placed in the tibia. The tibia was then cut and measured. This was then broached. The patella was then osteotomized, removing approximately 8-10 mm of bone. It measured. There was excellent range of motion, tracking and stability of the trials. The trials were removed. Bony cut surfaces were thoroughly irrigated. The knee was then injected. The cement was vacuum mixed, placed on the undersurface of the components and then packed into place. Excess cement was removed. Once this had hardened, trial polys were tried and the appropriate sized anterior insert was then chosen. This was then locked, thoroughly irrigated. Bovie electrocautery was used for hemostasis. The tourniquet was deflated. This was again thoroughly irrigated and closed using #1 Vicryl, 2-0 Vicryl and staples. A sterile dressing was applied. The patient was then extubated and taken to the recovery room in stable condition. There were no complications.    Assistant: Yue Marin  was responsible for performing the following activities: Retraction, Suction, Irrigation, Closing and Placing Dressing and their skilled assistance was necessary for the success of this case.    Bao Vigil MD     Date: 2/13/2023  Time: 13:55 EST

## 2023-02-13 NOTE — ANESTHESIA PREPROCEDURE EVALUATION
Anesthesia Evaluation     Patient summary reviewed and Nursing notes reviewed   no history of anesthetic complications:  NPO Solid Status: > 8 hours  NPO Liquid Status: > 2 hours           Airway   Mallampati: II  TM distance: >3 FB  Neck ROM: full  No difficulty expected  Dental      Pulmonary - normal exam    breath sounds clear to auscultation  (+) a smoker Former, COPD, shortness of breath, sleep apnea,   Cardiovascular - normal exam  Exercise tolerance: good (4-7 METS)    Rhythm: regular  Rate: normal    (+) hypertension, CAD (no stents), hyperlipidemia,       Neuro/Psych- negative ROS  GI/Hepatic/Renal/Endo    (+) obesity,   thyroid problem hypothyroidism    Musculoskeletal     Abdominal    Substance History      OB/GYN          Other   arthritis,      ROS/Med Hx Other: >4METS, HX HTN, CAD, COPD, RBBB.   ECHO 7/29/22 EF 61-65%, NO VALVE STENOSIS.   CARDS OV 1/13/23 F/U [facility]. CARDS CLEARANCE/MED DIRECTIVE                  Anesthesia Plan    ASA 3     general, regional and ERAS Protocol     (Patient understands anesthesia not responsible for dental damage. Regional anesthesia options discussed with patient. Pt accepts regional block.)  intravenous induction     Anesthetic plan, risks, benefits, and alternatives have been provided, discussed and informed consent has been obtained with: patient.    Plan discussed with CRNA.        CODE STATUS:        Consent: The patient's consent was obtained including but not limited to risks of crusting, scabbing, blistering, scarring, darker or lighter pigmentary change, recurrence, incomplete removal and infection. Duration Of Freeze Thaw-Cycle (Seconds): 0 Render Note In Bullet Format When Appropriate: No Post-Care Instructions: I reviewed with the patient in detail post-care instructions. Patient is to wear sunprotection, and avoid picking at any of the treated lesions. Pt may apply Vaseline to crusted or scabbing areas. Number Of Freeze-Thaw Cycles: 1 freeze-thaw cycle Detail Level: Zone

## 2023-02-13 NOTE — THERAPY EVALUATION
Acute Care - Physical Therapy Initial Evaluation   Frank     Patient Name: Percy Velarde  : 1950  MRN: [id number]  Today's Date: 2023      Visit Dx:     ICD-10-CM ICD-9-CM   1. Difficulty in walking  R26.2 719.7   2. Bilateral primary osteoarthritis of knee  M17.0 715.16     Patient Active Problem List   Diagnosis   • Gastroesophageal reflux disease   • Hyperlipemia   • Hypertension, essential   • Hypothyroidism   • Coronary artery disease involving native heart without angina pectoris   • Sick sinus syndrome (HCC)   • SOB (shortness of breath) on exertion   • Family history of colon cancer   • OA (osteoarthritis) of knee   • Bilateral primary osteoarthritis of knee     Past Medical History:   Diagnosis Date   • Anemia    • Arthritis    • COPD (chronic obstructive pulmonary disease) (HCC)    • Coronary artery disease involving native heart without angina pectoris 2021    Follows with Dr. Winters   • ETD (Eustachian tube dysfunction), bilateral    • GERD (gastroesophageal reflux disease)    • Gout     no issues for several years   • H/O arthroscopy of shoulder 2017    aftercare following, right    • Hearing loss    • History of arthroscopy of right shoulder 2018   • Hyperlipemia    • Hypertension, essential    • Hypothyroidism    • Limb swelling     feet ankles   • Lipoma     removed   • Low back pain    • Melanoma (HCC)     left arm removed   • Nasal septal ulcer    • Otalgia    • Personal history of colonic polyps 2014   • Recurrent epistaxis     no current issues   • Rotator cuff tear arthropathy of right shoulder 2017    repaired   • Seasonal allergies    • Sleep apnea     does not use CPAP   • Tinnitus, bilateral      Past Surgical History:   Procedure Laterality Date   • CHOLECYSTECTOMY     • COLONOSCOPY  12/10/2019    DR. RONDON   • COLONOSCOPY N/A 2023    Procedure: COLONOSCOPY WITH COLD SNARE POLYPECTOMY;  Surgeon: Mich Rondon MD;  Location:   [facility] ENDOSCOPY;  Service: Gastroenterology;  Laterality: N/A;  COLON POLYP. DIVERTICULOSIS   • ENDOSCOPY     • ENDOSCOPY N/A 09/20/2021    Procedure: ESOPHAGOGASTRODUODENOSCOPY with biopsy;  Surgeon: Mich Lane MD;  Location: [facility] ENDOSCOPY;  Service: Gastroenterology;  Laterality: N/A;  gastric polyps   • LIPOMA EXCISION  02/26/2019    removal  neck   • MYRINGOTOMY W/ TUBES      states had had several pairs placed by Dr. Kaba    • OTHER SURGICAL HISTORY Left 2013    melanoma excision from shoulder    • ROTATOR CUFF REPAIR Right 2017   • TONSILLECTOMY     • TOTAL KNEE ARTHROPLASTY Right 2/13/2023    Procedure: RIGHT TOTAL KNEE ARTHROPLASTY.;  Surgeon: Bao Vigil MD;  Location: [facility] MAIN OR;  Service: Orthopedics;  Laterality: Right;   • UPPER GASTROINTESTINAL ENDOSCOPY       PT Assessment (last 12 hours)     PT Evaluation and Treatment     Row Name 02/13/23 1502          Physical Therapy Time and Intention    Subjective Information no complaints  -FIFI     Document Type evaluation  -FIFI     Mode of Treatment individual therapy;physical therapy  -FIFI     Patient Effort good  -IFFI     Symptoms Noted During/After Treatment none  -FIFI     Row Name 02/13/23 1502          General Information    Patient Profile Reviewed yes  -FIFI     Patient Observations alert;cooperative;agree to therapy  -FIFI     Prior Level of Function independent:;gait;transfer;bed mobility;ADL's  -FIFI     Equipment Currently Used at Home none  -FIFI     Barriers to Rehab none identified  -FIFI     Row Name 02/13/23 1502          Living Environment    Current Living Arrangements home  -FIFI     Home Accessibility stairs to enter home  -FIFI     People in Home alone  -FIFI     Primary Care Provided by self  -FIFI     Row Name 02/13/23 1502          Home Main Entrance    Number of Stairs, Main Entrance two  -FIFI     Row Name 02/13/23 1502          Home Use of Assistive/Adaptive Equipment    Equipment Currently Used at Home none  -FIFI     Row Name 02/13/23  1502          Cognition    Orientation Status (Cognition) oriented x 4  -FIFI     Row Name 02/13/23 1502          Range of Motion (ROM)    Range of Motion left lower extremity;ROM is WFL;other (see comments)  RLE: 81 degrees flexion AAROM  -FIFI     Row Name 02/13/23 1502          Strength (Manual Muscle Testing)    Strength (Manual Muscle Testing) other (see comments)  LLE: hip flexion 4+/5, knee extension 4+/5; RLE: hip flexion 4-/5, knee extension 3+/5  -FIFI     Row Name 02/13/23 1502          Bed Mobility    Bed Mobility supine-sit  -FIFI     Supine-Sit [place] (Bed Mobility) standby assist  -FIFI     Row Name 02/13/23 1502          Transfers    Transfers sit-stand transfer  -FIFI     Row Name 02/13/23 1502          Sit-Stand Transfer    Sit-Stand [place] (Transfers) contact guard  -FIFI     Assistive Device (Sit-Stand Transfers) walker, front-wheeled  -FIFI     Row Name 02/13/23 1502          Gait/Stairs (Locomotion)    Gait/Stairs Locomotion gait/ambulation independence;gait/ambulation assistive device;distance ambulated  -FIFI     [place] Level (Gait) contact guard  -FIFI     Assistive Device (Gait) walker, front-wheeled  -FIFI     Ambulated day of surgery or within 4 hours of PACU discharge yes  -FIFI     Distance in Feet (Gait) 20  -FIFI     Row Name 02/13/23 1502          Balance    Balance Assessment standing dynamic balance  -FIFI     Dynamic Standing Balance contact guard  -FIFI     Position/Device Used, Standing Balance supported;walker, front-wheeled  -FIFI     Balance Interventions standing;supported;dynamic  -FIFI     Row Name             Wound 02/13/23 1243 Right anterior knee Incision    Wound - Properties Group Placement Date: 02/13/23  -BW Placement Time: 1243  -BW Present on Hospital Admission: N  -BW Side: Right  -BW Orientation: anterior  -BW Location: knee  -BW Primary Wound Type: Incision  -BW    Retired Wound - Properties Group Placement Date: 02/13/23  -BW Placement Time: 1243  -BW Present on Hospital  Admission: N  -BW Side: Right  -BW Orientation: anterior  -BW Location: knee  -BW Primary Wound Type: Incision  -BW    Retired Wound - Properties Group Date first assessed: 02/13/23  -BW Time first assessed: 1243  -BW Present on Hospital Admission: N  -BW Side: Right  -BW Location: knee  -BW Primary Wound Type: Incision  -BW    Row Name 02/13/23 1502          Plan of Care Review    Plan of Care Reviewed With patient  -FIFI     Outcome Evaluation Pt demonstrates strength, mobility, and ROM deficits. He will benefit from in patient PT services. Recommend discharge to home with home health following discharge from hospital.  -FIFI     Row Name 02/13/23 1502          Therapy Assessment/Plan (PT)    Patient/Family Therapy Goals Statement (PT) to walk  -FIFI     Rehab Potential (PT) good, to achieve stated therapy goals  -FIFI     Criteria for Skilled Interventions Met (PT) yes;skilled treatment is necessary  -FIFI     Therapy Frequency (PT) 2 times/day  -FIFI     Predicted Duration of Therapy Intervention (PT) 10  -FIFI     Problem List (PT) problems related to;balance;mobility;strength;range of motion (ROM)  -FIFI     Activity Limitations Related to Problem List (PT) unable to ambulate safely;unable to transfer safely  -FIFI     Row Name 02/13/23 1502          PT Evaluation Complexity    History, PT Evaluation Complexity no personal factors and/or comorbidities  -FIFI     Examination of Body Systems (PT Eval Complexity) total of 4 or more elements  -FIFI     Clinical Presentation (PT Evaluation Complexity) stable  -FIFI     Clinical Decision Making (PT Evaluation Complexity) low complexity  -FIFI     Overall Complexity (PT Evaluation Complexity) low complexity  -FIFI     Row Name 02/13/23 1502          Physical Therapy Goals    Bed Mobility Goal Selection (PT) bed mobility, PT goal 1  -FIFI     Transfer Goal Selection (PT) transfer, PT goal 1  -FIFI     Gait Training Goal Selection (PT) gait training, PT goal 1  -FIFI     Row Name 02/13/23 1507           Bed Mobility Goal 1 (PT)    Activity/Assistive Device (Bed Mobility Goal 1, PT) sit to supine/supine to sit  -FIFI     [place] Level/Cues Needed (Bed Mobility Goal 1, PT) independent  -FIFI     Time Frame (Bed Mobility Goal 1, PT) 10 days  -FIFI     Row Name 02/13/23 1502          Transfer Goal 1 (PT)    Activity/Assistive Device (Transfer Goal 1, PT) sit-to-stand/stand-to-sit  -FIFI     [place] Level/Cues Needed (Transfer Goal 1, PT) independent  -FIFI     Time Frame (Transfer Goal 1, PT) 10 days  -FIFI     Row Name 02/13/23 1502          Gait Training Goal 1 (PT)    Activity/Assistive Device (Gait Training Goal 1, PT) gait (walking locomotion);assistive device use;walker, rolling  -FIFI     [place] Level (Gait Training Goal 1, PT) standby assist  -FIFI     Distance (Gait Training Goal 1, PT) 300  -FIFI     Time Frame (Gait Training Goal 1, PT) 10 days  -FIFI           User Key  (r) = Recorded By, (t) = Taken By, (c) = Cosigned By    Initials Name Provider Type    Kevin Olivas, RN Registered Nurse    Ebenezer De Leon, PT Physical Therapist                  PT Recommendation and Plan  Anticipated Discharge Disposition (PT): home with home health  Planned Therapy Interventions (PT): balance training, bed mobility training, gait training, strengthening, transfer training  Therapy Frequency (PT): 2 times/day  Plan of Care Reviewed With: patient  Outcome Evaluation: Pt demonstrates strength, mobility, and ROM deficits. He will benefit from in patient PT services. Recommend discharge to home with home health following discharge from hospital.   Outcome Measures     Row Name 02/13/23 1500             How much help from another person do you currently need...    Turning from your back to your side while in flat bed without using bedrails? 4  -FIFI      Moving from lying on back to sitting on the side of a flat bed without bedrails? 4  -FIFI      Moving to and from a bed to a chair (including a wheelchair)? 3  -FIFI       Standing up from a chair using your arms (e.g., wheelchair, bedside chair)? 3  -FIFI      Climbing 3-5 steps with a railing? 3  -FIFI      To walk in hospital room? 3  -FIFI      AM-PAC 6 Clicks Score (PT) 20  -FIFI         Functional Assessment    Outcome Measure Options AM-PAC 6 Clicks Basic Mobility (PT)  -FIFI            User Key  (r) = Recorded By, (t) = Taken By, (c) = Cosigned By    Initials Name Provider Type    Ebenezer De Leon PT Physical Therapist                 Time Calculation:    PT Charges     Row Name 02/13/23 1533             Time Calculation    PT Received On 02/13/23  -FIFI      PT Goal Re-Cert Due Date 02/22/23  -FIFI         Untimed Charges    PT Eval/Re-eval Minutes 30  -FIFI         Total Minutes    Untimed Charges Total Minutes 30  -FIFI       Total Minutes 30  -FIFI            User Key  (r) = Recorded By, (t) = Taken By, (c) = Cosigned By    Initials Name Provider Type    Ebenezer De Leon PT Physical Therapist              Therapy Charges for Today     Code Description Service Date Service Provider Modifiers Qty    [id number] HC PT EVAL LOW COMPLEXITY 2 2/13/2023 Ebenezer Hay PT GP 1          PT G-Codes  Outcome Measure Options: AM-PAC 6 Clicks Basic Mobility (PT)  AM-PAC 6 Clicks Score (PT): 20    Ebenezer Hay PT  2/13/2023

## 2023-02-13 NOTE — PLAN OF CARE
Goal Outcome Evaluation:  Plan of Care Reviewed With: patient           Outcome Evaluation: Pt demonstrates strength, mobility, and ROM deficits. He will benefit from in patient PT services. Recommend discharge to home with home health following discharge from hospital.

## 2023-02-13 NOTE — ANESTHESIA POSTPROCEDURE EVALUATION
Patient: Percy Velarde    Procedure Summary     Date: 02/13/23 Room / Location: [facility] OR 03 / [facility] MAIN OR    Anesthesia Start: 1215 Anesthesia Stop: 1403    Procedure: RIGHT TOTAL KNEE ARTHROPLASTY. (Right: Knee) Diagnosis:       Bilateral primary osteoarthritis of knee      (Bilateral primary osteoarthritis of knee [M17.0])    Surgeons: Bao Vigil MD Provider: Reyes, Mirabelle, DO    Anesthesia Type: general, regional, ERAS Protocol ASA Status: 3          Anesthesia Type: general, regional, ERAS Protocol    Vitals  Vitals Value Taken Time   /80 02/13/23 1444   Temp 36.6 °C (97.8 °F) 02/13/23 1400   Pulse 76 02/13/23 1448   Resp 16 02/13/23 1430   SpO2 96 % 02/13/23 1448   Vitals shown include unvalidated device data.        Post Anesthesia Care and Evaluation    Patient location during evaluation: bedside  Patient participation: complete - patient participated  Level of consciousness: awake  Pain management: adequate    Airway patency: patent  Anesthetic complications: No anesthetic complications  PONV Status: none  Cardiovascular status: acceptable and stable  Respiratory status: acceptable  Hydration status: acceptable    Comments: An Anesthesiologist personally participated in the most demanding procedures (including induction and emergence if applicable) in the anesthesia plan, monitored the course of anesthesia administration at frequent intervals and remained physically present and available for immediate diagnosis and treatment of emergencies.

## 2023-02-14 ENCOUNTER — TELEPHONE (OUTPATIENT)
Dept: ORTHOPEDIC SURGERY | Facility: CLINIC | Age: 73
End: 2023-02-14
Payer: COMMERCIAL

## 2023-02-14 VITALS
OXYGEN SATURATION: 94 % | TEMPERATURE: 97.3 F | BODY MASS INDEX: 37.19 KG/M2 | SYSTOLIC BLOOD PRESSURE: 173 MMHG | WEIGHT: 251.1 LBS | HEIGHT: 69 IN | HEART RATE: 56 BPM | RESPIRATION RATE: 18 BRPM | DIASTOLIC BLOOD PRESSURE: 69 MMHG

## 2023-02-14 LAB
ANION GAP SERPL CALCULATED.3IONS-SCNC: 9.7 MMOL/L (ref 5–15)
BUN SERPL-MCNC: 23 MG/DL (ref 8–23)
BUN/CREAT SERPL: 15.3 (ref 7–25)
CALCIUM SPEC-SCNC: 8.6 MG/DL (ref 8.6–10.5)
CHLORIDE SERPL-SCNC: 99 MMOL/L (ref 98–107)
CO2 SERPL-SCNC: 24.3 MMOL/L (ref 22–29)
CREAT SERPL-MCNC: 1.5 MG/DL (ref 0.76–1.27)
DEPRECATED RDW RBC AUTO: 68.2 FL (ref 37–54)
EGFRCR SERPLBLD CKD-EPI 2021: 48.2 ML/MIN/1.73
ERYTHROCYTE [DISTWIDTH] IN BLOOD BY AUTOMATED COUNT: 23.5 % (ref 12.3–15.4)
GLUCOSE SERPL-MCNC: 110 MG/DL (ref 65–99)
HCT VFR BLD AUTO: 35.8 % (ref 37.5–51)
HGB BLD-MCNC: 11.6 G/DL (ref 13–17.7)
MCH RBC QN AUTO: 27.2 PG (ref 26.6–33)
MCHC RBC AUTO-ENTMCNC: 32.4 G/DL (ref 31.5–35.7)
MCV RBC AUTO: 83.8 FL (ref 79–97)
PLATELET # BLD AUTO: 142 10*3/MM3 (ref 140–450)
PMV BLD AUTO: 11.5 FL (ref 6–12)
POTASSIUM SERPL-SCNC: 4 MMOL/L (ref 3.5–5.2)
RBC # BLD AUTO: 4.27 10*6/MM3 (ref 4.14–5.8)
SODIUM SERPL-SCNC: 133 MMOL/L (ref 136–145)
WBC NRBC COR # BLD: 8.85 10*3/MM3 (ref 3.4–10.8)

## 2023-02-14 PROCEDURE — A9270 NON-COVERED ITEM OR SERVICE: HCPCS | Performed by: ORTHOPAEDIC SURGERY

## 2023-02-14 PROCEDURE — 97165 OT EVAL LOW COMPLEX 30 MIN: CPT

## 2023-02-14 PROCEDURE — 25010000002 ENOXAPARIN PER 10 MG: Performed by: ORTHOPAEDIC SURGERY

## 2023-02-14 PROCEDURE — 97535 SELF CARE MNGMENT TRAINING: CPT

## 2023-02-14 PROCEDURE — 63710000001 SENNOSIDES-DOCUSATE 8.6-50 MG TABLET: Performed by: ORTHOPAEDIC SURGERY

## 2023-02-14 PROCEDURE — 97110 THERAPEUTIC EXERCISES: CPT

## 2023-02-14 PROCEDURE — 25010000002 KETOROLAC TROMETHAMINE PER 15 MG: Performed by: ORTHOPAEDIC SURGERY

## 2023-02-14 PROCEDURE — 80048 BASIC METABOLIC PNL TOTAL CA: CPT | Performed by: ORTHOPAEDIC SURGERY

## 2023-02-14 PROCEDURE — 25010000002 CEFAZOLIN IN DEXTROSE 2-4 GM/100ML-% SOLUTION: Performed by: INTERNAL MEDICINE

## 2023-02-14 PROCEDURE — 63710000001 HYDROCODONE-ACETAMINOPHEN 7.5-325 MG TABLET: Performed by: ORTHOPAEDIC SURGERY

## 2023-02-14 PROCEDURE — 94799 UNLISTED PULMONARY SVC/PX: CPT

## 2023-02-14 PROCEDURE — 99213 OFFICE O/P EST LOW 20 MIN: CPT | Performed by: INTERNAL MEDICINE

## 2023-02-14 PROCEDURE — 97150 GROUP THERAPEUTIC PROCEDURES: CPT

## 2023-02-14 PROCEDURE — 63710000001 ACETAMINOPHEN 500 MG TABLET: Performed by: ORTHOPAEDIC SURGERY

## 2023-02-14 PROCEDURE — 85027 COMPLETE CBC AUTOMATED: CPT | Performed by: PHYSICIAN ASSISTANT

## 2023-02-14 PROCEDURE — 63710000001 FAMOTIDINE 20 MG TABLET: Performed by: ORTHOPAEDIC SURGERY

## 2023-02-14 PROCEDURE — 97530 THERAPEUTIC ACTIVITIES: CPT

## 2023-02-14 PROCEDURE — 97116 GAIT TRAINING THERAPY: CPT

## 2023-02-14 RX ORDER — HYDROCODONE BITARTRATE AND ACETAMINOPHEN 7.5; 325 MG/1; MG/1
1-2 TABLET ORAL EVERY 4 HOURS PRN
Qty: 40 TABLET | Refills: 0 | Status: SHIPPED | OUTPATIENT
Start: 2023-02-14 | End: 2023-02-17 | Stop reason: SDUPTHER

## 2023-02-14 RX ADMIN — KETOROLAC TROMETHAMINE 15 MG: 30 INJECTION, SOLUTION INTRAMUSCULAR; INTRAVENOUS at 12:19

## 2023-02-14 RX ADMIN — ENOXAPARIN SODIUM 40 MG: 100 INJECTION SUBCUTANEOUS at 08:25

## 2023-02-14 RX ADMIN — CEFAZOLIN SODIUM 2 G: 2 INJECTION, SOLUTION INTRAVENOUS at 04:28

## 2023-02-14 RX ADMIN — KETOROLAC TROMETHAMINE 15 MG: 30 INJECTION, SOLUTION INTRAMUSCULAR; INTRAVENOUS at 05:37

## 2023-02-14 RX ADMIN — SENNOSIDES AND DOCUSATE SODIUM 2 TABLET: 8.6; 5 TABLET ORAL at 08:26

## 2023-02-14 RX ADMIN — ACETAMINOPHEN 1000 MG: 500 TABLET ORAL at 08:27

## 2023-02-14 RX ADMIN — HYDROCODONE BITARTRATE AND ACETAMINOPHEN 1 TABLET: 7.5; 325 TABLET ORAL at 08:26

## 2023-02-14 RX ADMIN — FAMOTIDINE 40 MG: 20 TABLET, FILM COATED ORAL at 08:26

## 2023-02-14 NOTE — THERAPY EVALUATION
Patient Name: Percy Velarde  : 1950    MRN: [id number]                              Today's Date: 2023       Admit Date: 2023    Visit Dx:     ICD-10-CM ICD-9-CM   1. Difficulty in walking  R26.2 719.7   2. Bilateral primary osteoarthritis of knee  M17.0 715.16   3. Osteoarthritis of knee, unspecified laterality, unspecified osteoarthritis type  M17.9 715.36   4. Decreased activities of daily living (ADL)  Z78.9 V49.89     Patient Active Problem List   Diagnosis   • Gastroesophageal reflux disease   • Hyperlipemia   • Hypertension, essential   • Hypothyroidism   • Coronary artery disease involving native heart without angina pectoris   • Sick sinus syndrome (HCC)   • SOB (shortness of breath) on exertion   • Family history of colon cancer   • OA (osteoarthritis) of knee   • Bilateral primary osteoarthritis of knee     Past Medical History:   Diagnosis Date   • Anemia    • Arthritis    • COPD (chronic obstructive pulmonary disease) (HCC)    • Coronary artery disease involving native heart without angina pectoris 2021    Follows with Dr. Winters   • ETD (Eustachian tube dysfunction), bilateral    • GERD (gastroesophageal reflux disease)    • Gout     no issues for several years   • H/O arthroscopy of shoulder 2017    aftercare following, right    • Hearing loss    • History of arthroscopy of right shoulder 2018   • Hyperlipemia    • Hypertension, essential    • Hypothyroidism    • Limb swelling     feet ankles   • Lipoma     removed   • Low back pain    • Melanoma (HCC)     left arm removed   • Nasal septal ulcer    • Otalgia    • Personal history of colonic polyps 2014   • Recurrent epistaxis     no current issues   • Rotator cuff tear arthropathy of right shoulder 2017    repaired   • Seasonal allergies    • Sleep apnea     does not use CPAP   • Tinnitus, bilateral      Past Surgical History:   Procedure Laterality Date   • CHOLECYSTECTOMY     • COLONOSCOPY  12/10/2019     DR. LANE   • COLONOSCOPY N/A 02/06/2023    Procedure: COLONOSCOPY WITH COLD SNARE POLYPECTOMY;  Surgeon: Mich Lane MD;  Location: [facility] ENDOSCOPY;  Service: Gastroenterology;  Laterality: N/A;  COLON POLYP. DIVERTICULOSIS   • ENDOSCOPY     • ENDOSCOPY N/A 09/20/2021    Procedure: ESOPHAGOGASTRODUODENOSCOPY with biopsy;  Surgeon: Mich Lane MD;  Location: [facility] ENDOSCOPY;  Service: Gastroenterology;  Laterality: N/A;  gastric polyps   • LIPOMA EXCISION  02/26/2019    removal  neck   • MYRINGOTOMY W/ TUBES      states had had several pairs placed by Dr. Kaba    • OTHER SURGICAL HISTORY Left 2013    melanoma excision from shoulder    • ROTATOR CUFF REPAIR Right 2017   • TONSILLECTOMY     • TOTAL KNEE ARTHROPLASTY Right 2/13/2023    Procedure: RIGHT TOTAL KNEE ARTHROPLASTY.;  Surgeon: Bao Vigil MD;  Location: [facility] MAIN OR;  Service: Orthopedics;  Laterality: Right;   • UPPER GASTROINTESTINAL ENDOSCOPY        General Information     Row Name 02/14/23 0902 02/14/23 0854       OT Time and Intention    Document Type therapy note (daily note)  -PG evaluation  -PG    Mode of Treatment individual therapy;occupational therapy  -PG individual therapy;occupational therapy  -PG    Row Name 02/14/23 0854          General Information    Patient Profile Reviewed yes  -PG     Prior Level of Function independent:;transfer;ADL's  -PG     Existing Precautions/Restrictions fall  -PG     Barriers to Rehab none identified  -PG     Row Name 02/14/23 0854          Occupational Profile    Reason for Services/Referral (Occupational Profile) Patient is a pleasant 73-year-old male admitted for a right total knee replacement.  No previous OT services identified.  Patient is being evaluated by Occupational Therapy due to recent decline in ADL function  -PG     Row Name 02/14/23 0854          Cognition    Orientation Status (Cognition) oriented x 3  -PG     Row Name 02/14/23 0854          Safety Issues,  Functional Mobility    Impairments Affecting Function (Mobility) balance;strength;pain  -PG           User Key  (r) = Recorded By, (t) = Taken By, (c) = Cosigned By    Initials Name Provider Type    PG Dallas Osorio OT Occupational Therapist                 Mobility/ADL's     Row Name 02/14/23 0902 02/14/23 0856       Transfers    Transfers bed-chair transfer  -PG bed-chair transfer  -PG    Row Name 02/14/23 0902 02/14/23 0856       Bed-Chair Transfer    Bed-Chair [place] (Transfers) contact guard;verbal cues  -PG contact guard;verbal cues  -PG    Assistive Device (Bed-Chair Transfers) walker, front-wheeled  -PG walker, front-wheeled  -PG    Row Name 02/14/23 0902          Sit-Stand Transfer    Sit-Stand [place] (Transfers) contact guard  -PG     Assistive Device (Sit-Stand Transfers) walker, front-wheeled  -PG     Row Name 02/14/23 0856          Activities of Daily Living    BADL Assessment/Intervention bathing;upper body dressing;lower body dressing;grooming;toileting  -PG     Row Name 02/14/23 0902 02/14/23 0856       Bathing Assessment/Intervention    [place] Level (Bathing) bathing skills;minimum assist (75% patient effort)  -PG bathing skills;minimum assist (75% patient effort)  -PG    Position (Bathing) supported standing  -PG --    Row Name 02/14/23 0902 02/14/23 0856       Upper Body Dressing Assessment/Training    [place] Level (Upper Body Dressing) upper body dressing skills;set up  -PG upper body dressing skills;set up  -PG    Row Name 02/14/23 0902 02/14/23 0856       Lower Body Dressing Assessment/Training    [place] Level (Lower Body Dressing) lower body dressing skills;minimum assist (75% patient effort);verbal cues  -PG lower body dressing skills;minimum assist (75% patient effort)  -PG    Position (Lower Body Dressing) supported standing  -PG --    Row Name 02/14/23 0902 02/14/23 0856       Grooming Assessment/Training    [place] Level (Grooming) grooming skills;set  up  -PG grooming skills;set up  -PG    Row Name 02/14/23 0902 02/14/23 0856       Toileting Assessment/Training    [place] Level (Toileting) toileting skills;contact guard assist  -PG toileting skills;contact guard assist  -PG          User Key  (r) = Recorded By, (t) = Taken By, (c) = Cosigned By    Initials Name Provider Type    PG Dallas Osorio OT Occupational Therapist               Obj/Interventions     Row Name 02/14/23 0858          Sensory Assessment (Somatosensory)    Sensory Assessment (Somatosensory) sensation intact  -PG     Row Name 02/14/23 0858          Vision Assessment/Intervention    Visual Impairment/Limitations WFL  -PG     Row Name 02/14/23 0858          Range of Motion Comprehensive    General Range of Motion no range of motion deficits identified  -PG     Row Name 02/14/23 0858          Strength Comprehensive (MMT)    General Manual Muscle Testing (MMT) Assessment no strength deficits identified  -PG     Row Name 02/14/23 0858          Motor Skills    Motor Skills coordination;functional endurance  -PG     Coordination WFL  -PG     Functional Endurance Fair  -PG           User Key  (r) = Recorded By, (t) = Taken By, (c) = Cosigned By    Initials Name Provider Type    PG Dallas Osorio OT Occupational Therapist               Goals/Plan     Row Name 02/14/23 0900          Transfer Goal 1 (OT)    Activity/Assistive Device (Transfer Goal 1, OT) transfers, all  -PG     [place] Level/Cues Needed (Transfer Goal 1, OT) modified independence  -PG     Time Frame (Transfer Goal 1, OT) long term goal (LTG);10 days  -PG     Row Name 02/14/23 0900          Bathing Goal 1 (OT)    Activity/Device (Bathing Goal 1, OT) bathing skills, all  -PG     [place] Level/Cues Needed (Bathing Goal 1, OT) modified independence  -PG     Time Frame (Bathing Goal 1, OT) long term goal (LTG);10 days  -PG     Row Name 02/14/23 0900          Dressing Goal 1 (OT)    Activity/Device (Dressing Goal 1, OT) dressing  skills, all  -PG     [place]/Cues Needed (Dressing Goal 1, OT) modified independence  -PG     Time Frame (Dressing Goal 1, OT) long term goal (LTG);10 days  -PG     Row Name 02/14/23 0900          Toileting Goal 1 (OT)    Activity/Device (Toileting Goal 1, OT) toileting skills, all  -PG     [place] Level/Cues Needed (Toileting Goal 1, OT) modified independence  -PG     Time Frame (Toileting Goal 1, OT) long term goal (LTG);10 days  -PG     Row Name 02/14/23 0900          Grooming Goal 1 (OT)    Activity/Device (Grooming Goal 1, OT) grooming skills, all  -PG     [place] (Grooming Goal 1, OT) modified independence  -PG     Time Frame (Grooming Goal 1, OT) long term goal (LTG);10 days  -PG     Row Name 02/14/23 0900          Therapy Assessment/Plan (OT)    Planned Therapy Interventions (OT) activity tolerance training;BADL retraining;strengthening exercise;transfer/mobility retraining;occupation/activity based interventions;patient/caregiver education/training  -PG           User Key  (r) = Recorded By, (t) = Taken By, (c) = Cosigned By    Initials Name Provider Type    PG Dallas Osorio, OT Occupational Therapist               Clinical Impression     Row Name 02/14/23 0859          Pain Assessment    Pretreatment Pain Rating 0/10 - no pain  -PG     Posttreatment Pain Rating 0/10 - no pain  -PG     Row Name 02/14/23 0859          Plan of Care Review    Plan of Care Reviewed With patient  -PG     Progress no change  -PG     Outcome Evaluation Patient presents with limitations affecting strength, activity tolerance, and balance impacting patient's ability to return home safely and independently.  The skills of a therapist will be required to safely and effectively implement the following treatment plan to restore maximal level of function  -PG     Row Name 02/14/23 0822          Therapy Assessment/Plan (OT)    Patient/Family Therapy Goal Statement (OT) Return home independently walk without pain  -PG      Criteria for Skilled Therapeutic Interventions Met (OT) yes;meets criteria;skilled treatment is necessary  -PG     Therapy Frequency (OT) 5 times/wk  -PG     Row Name 02/14/23 0859          Therapy Plan Review/Discharge Plan (OT)    Anticipated Discharge Disposition (OT) home with home health  -PG           User Key  (r) = Recorded By, (t) = Taken By, (c) = Cosigned By    Initials Name Provider Type    PG Dallas Osorio OT Occupational Therapist               Outcome Measures     Row Name 02/14/23 0901          How much help from another is currently needed...    Putting on and taking off regular lower body clothing? 3  -PG     Bathing (including washing, rinsing, and drying) 3  -PG     Toileting (which includes using toilet bed pan or urinal) 3  -PG     Putting on and taking off regular upper body clothing 3  -PG     Taking care of personal grooming (such as brushing teeth) 4  -PG     Eating meals 4  -PG     AM-PAC 6 Clicks Score (OT) 20  -PG     Row Name 02/14/23 0901          Functional Assessment    Outcome Measure Options AM-PAC 6 Clicks Daily Activity (OT);Optimal Instrument  -PG     Row Name 02/14/23 0901          Optimal Instrument    Optimal Instrument Optimal - 3  -PG     Bending/Stooping 2  -PG     Standing 2  -PG     Reaching 1  -PG     From the list, choose the 3 activities you would most like to be able to do without any difficulty Bending/stooping;Standing;Reaching  -PG     Total Score Optimal - 3 5  -PG           User Key  (r) = Recorded By, (t) = Taken By, (c) = Cosigned By    Initials Name Provider Type    PG Dallas Osorio OT Occupational Therapist                Occupational Therapy Education     Title: PT OT SLP Therapies (Done)     Topic: Occupational Therapy (Done)     Point: ADL training (Done)     Description:   Instruct learner(s) on proper safety adaptation and remediation techniques during self care or transfers.   Instruct in proper use of assistive devices.              Learning  Progress Summary           Patient Acceptance, E,D, DU by PG at 2/14/2023 0901                   Point: Home exercise program (Done)     Description:   Instruct learner(s) on appropriate technique for monitoring, assisting and/or progressing therapeutic exercises/activities.              Learning Progress Summary           Patient Acceptance, E,D, DU by PG at 2/14/2023 0901                   Point: Precautions (Done)     Description:   Instruct learner(s) on prescribed precautions during self-care and functional transfers.              Learning Progress Summary           Patient Acceptance, E,D, DU by PG at 2/14/2023 0901                   Point: Body mechanics (Done)     Description:   Instruct learner(s) on proper positioning and spine alignment during self-care, functional mobility activities and/or exercises.              Learning Progress Summary           Patient Acceptance, E,D, DU by PG at 2/14/2023 0901                               User Key     Initials Effective Dates Name Provider Type Discipline     06/16/21 -  Dallas Osorio OT Occupational Therapist OT              OT Recommendation and Plan  Planned Therapy Interventions (OT): activity tolerance training, BADL retraining, strengthening exercise, transfer/mobility retraining, occupation/activity based interventions, patient/caregiver education/training  Therapy Frequency (OT): 5 times/wk  Plan of Care Review  Plan of Care Reviewed With: patient  Progress: no change  Outcome Evaluation: Patient presents with limitations affecting strength, activity tolerance, and balance impacting patient's ability to return home safely and independently.  The skills of a therapist will be required to safely and effectively implement the following treatment plan to restore maximal level of function     Time Calculation:    Time Calculation- OT     Row Name 02/14/23 0904             Time Calculation- OT    OT Received On 02/14/23  -      OT Goal Re-Cert Due Date  02/23/23  -PG         Timed Charges    22420 - OT Therapeutic Activity Minutes 10  -PG      56863 - OT Self Care/Mgmt Minutes 15  -PG         Untimed Charges    OT Eval/Re-eval Minutes 20  -PG         Total Minutes    Timed Charges Total Minutes 25  -PG      Untimed Charges Total Minutes 20  -PG       Total Minutes 45  -PG            User Key  (r) = Recorded By, (t) = Taken By, (c) = Cosigned By    Initials Name Provider Type    PG Dallas Osorio, BONG Occupational Therapist              Therapy Charges for Today     Code Description Service Date Service Provider Modifiers Qty    05958497950 HC OT THERAPEUTIC ACT EA 15 MIN 2/14/2023 Dallas Osorio OT GO 1    06562767472 HC OT SELF CARE/MGMT/TRAIN EA 15 MIN 2/14/2023 Dallas Osorio OT GO 1    87572555139 HC OT EVAL LOW COMPLEXITY 2 2/14/2023 Dallas Osorio OT GO 1               Dallas Osorio OT  2/14/2023

## 2023-02-14 NOTE — PROGRESS NOTES
[facility]   Hospitalist Progress Note       Patient Name: Percy Velarde  : 1950  MRN: [id number]  Primary Care Physician: Agustin Balbuena MD  Date of admission: 2023  Today's Date: 2023  Room / Bed:   226/1  Subjective   Chief Complaint: Medical management     HPI:     Percy Velarde is a 73 y.o. male is being seen by hospitalist group for medical management.  He has a history of dyslipidemia, hypothyroidism, gout, HTN, severe osteoarthritis, allergies, chronic bronchitis, CKD2-3, and CAD.  He is regularly followed by cardiology and saw Dr. Winters last month prior to upcoming knee surgery.  He was cleared for surgery at moderate risk and told to hold aspirin 5-7 days prior.  Dr. Vigil performed right total knee arthroplasty 23.  Recent labs show normal CBC and BMP with recent A1c 5.3.  Notably, creatinine 1.2.     Currently, resting in recliner.  He is alert and conversational.  Hard of hearing but in no particular distress currently.  His daughter is at bedside.  Vital signs are stable.  He has already taken a few steps on his new knee.  He denies any postop nausea at this point.  Has yet to void.  He is planning on returning home tomorrow and is going to have home health therapy set up initially with eventual goal of outpatient PT.    Interval Followup: 2023    • No overnight issues or new concerns  • He has been doing well with therapy  • No dizziness or syncope  • Vital signs stable  • Pain in right knee reasonably controlled  • Planning on home health  • Creatinine 1.5, appears to be close to baseline.  Denies urinary retention.  • Hgb 11.6.  No evidence of bleeding/hematoma.      REVIEW OF SYSTEMS: All other systems reviewed and are negative.   • GEN: No fevers. No chills. No weight gain. No weight loss.     • HEENT:   No dysphagia/odynophagia. No visual disturbance.    • GI:    No N/V.  No abd pain. No diarrhea. No constipation.  No bloody/black tarry stools. No  hematemesis.   • CV: No chest discomfort.  No palps. No lightheadedness. No syncope. No orthopnea/PND. No edema.   • RESP:    No cough. No wheeze.  No increased sputum. No hemoptysis. No WOO.  • :   No dysuria or suprapubic discomfort. No frequency.No urgency. No hesitancy. No incontinence. No hematuria. No flank pain.    • MS:   R knee joint stiffness and pain. No myalgias. RLE muscle weakness.    • SKIN:   No painful or pruritic rashes.  No skin discoloration.  • NEURO:  No focal numbness or weakness.  No headaches.  No ataxia. No slurred speech. No receptive/expressive aphasia.      • PSYCH:   No anxiety. No depression.  • ENDO:  No tremor, hair loss, heat or cold intolerance.  Objective   Temp:  [97.3 °F (36.3 °C)-98.5 °F (36.9 °C)] 97.3 °F (36.3 °C)  Heart Rate:  [52-89] 56  Resp:  [13-19] 18  BP: (130-173)/(62-99) 173/69  Flow (L/min):  [0-2] 0  PHYSICAL EXAM   • CON: WN. WD. NAD.   • EYES:  Sclera anicteric. EOMI. Normal conjunctiva.   • ENT:  Oropharyngeal mucosa without ulcers or thrush.    • NECK:  No thyromegaly. No stridor. Trachea midline.  • RESP:  CTA. No wheezes. No crackles.  No work of breathing or tachypnea.   • CV:  Rhythm regular. Rate WNL. No murmur noted.  No edema.  • GI:  Soft and nontender. Nondistended.  Bowel sounds present.   • EXT: Peripheral pulses intact.  No joint deformities or cyanosis.  • LYMPH:  No lymphedema noted.  No cervical lymphadenopathy.  • PSYCH:  Alert. Oriented. Normal affect and mood.  • NEURO:  CNII-XII grossly intact. No dysarthria or aphasia. No unilateral weakness or paresthesia.  • SKIN: No chronic venous stasis changes or varicosities.  No cellulitis    Results from last 7 days   Lab Units 02/14/23  0446   WBC 10*3/mm3 8.85   HEMOGLOBIN g/dL 11.6*   HEMATOCRIT % 35.8*   PLATELETS 10*3/mm3 142     Results from last 7 days   Lab Units 02/14/23  0446   SODIUM mmol/L 133*   POTASSIUM mmol/L 4.0   CO2 mmol/L 24.3   CHLORIDE mmol/L 99   ANION GAP mmol/L 9.7   BUN  mg/dL 23   CREATININE mg/dL 1.50*   GLUCOSE mg/dL 110*           RESULTS REVIEWED:  I have personally reviewed the results from the time of this admission to 2/14/2023 13:18 EST and agree with these findings:  []  Laboratory  []  Microbiology  []  Radiology  []  EKG/Telemetry   []  Cardiology/Vascular   []  Pathology  []  Old records  []  Other:  Assessment / Plan   Assessment:  • Medical management  • Hx of Bradycardia (Ochoa/Price)  • Hx of RBBB (normal EF % 2022 echo)  • COPD/Chronic bronchitis, stable  • EDNA (could not tolerate home machine)  • HTN  • Dyslipidemia  • Hypothyroidism  • GERD  • Gout  • Obesity w BMI > 37  • Allergies  • Severe osteoarthritis, knees  • Right total knee replacement 2/13/23 by Dr. Vigil     Plan:  • Home with home health today  • Follow-up orthopedic surgeon 2 weeks  • Resume home medical regimen  • DVT prophylaxis per orthopedist  • Pain med Rx per orthopedist  Discussed plan with RN.  DVT prophylaxis:  Medical and mechanical DVT prophylaxis orders are present.  CODE STATUS:            Electronically signed by DASHA Lincoln, 02/14/23, 1:18 PM EST.    Patient independently seen and evaluated, agree with assessment and plan, above documentation reflects plan put forth during bedside rounds.  More than 51% of the time of this patient encounter was performed by me.    Interval history:  Agree with assessment plan as above, pain mostly controlled, working well with physical therapy    Exam:  General appearance: NAD, conversant   Eyes: anicteric sclerae, moist conjunctivae; no lid-lag; PERRLA  HENT: Atraumatic; oropharynx clear with moist mucous membranes and no mucosal ulcerations; normal hard and soft palate  Neck: Trachea midline; FROM, supple, no thyromegaly or lymphadenopathy  Lungs: CTA, with normal respiratory effort and no intercostal retractions  CV: Regular Rate and Rhythm, no Murmurs, Rubs, or Gallops   Abdomen: Soft, non-tender; no masses or  Heptosplenomegally  Extremities: No peripheral edema or extremity lymphadenopathy  Skin: Normal temperature, turgor and texture; no rash, ulcers or subcutaneous nodules  Psych: Appropriate affect, alert and oriented to person, place and time  Neuro: CN II - XII intact no motor deficits, no sensory defecits    Plan:  Agree with assessment and plan as above  Continue oral narcotic pain medicine for pain control  Resume home iron supplementation  Resume home GERD medication  DVT prophylaxis  Discharge home today with home health  Follow-up with Ortho in 2 weeks    Reviewed patients labs and imaging, and discussed with patient and nurse at bedside.      Electronically signed by Norman Hernandez MD, 02/14/23, 2:26 PM EST.

## 2023-02-14 NOTE — THERAPY TREATMENT NOTE
Acute Care - Physical Therapy Treatment Note  GAVIN Marie     Patient Name: Percy Velarde  : 1950  MRN: [id number]  Today's Date: 2023 Gait- individual; ther- ex -group setting; 5 participants         Visit Dx:     ICD-10-CM ICD-9-CM   1. Difficulty in walking  R26.2 719.7   2. Bilateral primary osteoarthritis of knee  M17.0 715.16   3. Osteoarthritis of knee, unspecified laterality, unspecified osteoarthritis type  M17.9 715.36   4. Decreased activities of daily living (ADL)  Z78.9 V49.89     Patient Active Problem List   Diagnosis   • Gastroesophageal reflux disease   • Hyperlipemia   • Hypertension, essential   • Hypothyroidism   • Coronary artery disease involving native heart without angina pectoris   • Sick sinus syndrome (HCC)   • SOB (shortness of breath) on exertion   • Family history of colon cancer   • OA (osteoarthritis) of knee   • Bilateral primary osteoarthritis of knee     Past Medical History:   Diagnosis Date   • Anemia    • Arthritis    • COPD (chronic obstructive pulmonary disease) (HCC)    • Coronary artery disease involving native heart without angina pectoris 2021    Follows with Dr. Winters   • ETD (Eustachian tube dysfunction), bilateral    • GERD (gastroesophageal reflux disease)    • Gout     no issues for several years   • H/O arthroscopy of shoulder 2017    aftercare following, right    • Hearing loss    • History of arthroscopy of right shoulder 2018   • Hyperlipemia    • Hypertension, essential    • Hypothyroidism    • Limb swelling     feet ankles   • Lipoma     removed   • Low back pain    • Melanoma (HCC)     left arm removed   • Nasal septal ulcer    • Otalgia    • Personal history of colonic polyps 2014   • Recurrent epistaxis     no current issues   • Rotator cuff tear arthropathy of right shoulder 2017    repaired   • S/P TKR (total knee replacement), right     2023   • Seasonal allergies    • Sleep apnea     does not use CPAP   •  Tinnitus, bilateral      Past Surgical History:   Procedure Laterality Date   • CHOLECYSTECTOMY     • COLONOSCOPY  12/10/2019    DR. LANE   • COLONOSCOPY N/A 02/06/2023    Procedure: COLONOSCOPY WITH COLD SNARE POLYPECTOMY;  Surgeon: Mich Lane MD;  Location: [facility] ENDOSCOPY;  Service: Gastroenterology;  Laterality: N/A;  COLON POLYP. DIVERTICULOSIS   • ENDOSCOPY     • ENDOSCOPY N/A 09/20/2021    Procedure: ESOPHAGOGASTRODUODENOSCOPY with biopsy;  Surgeon: Mich Lane MD;  Location: [facility] ENDOSCOPY;  Service: Gastroenterology;  Laterality: N/A;  gastric polyps   • LIPOMA EXCISION  02/26/2019    removal  neck   • MYRINGOTOMY W/ TUBES      states had had several pairs placed by Dr. Kaba    • OTHER SURGICAL HISTORY Left 2013    melanoma excision from shoulder    • ROTATOR CUFF REPAIR Right 2017   • TONSILLECTOMY     • TOTAL KNEE ARTHROPLASTY Right 2/13/2023    Procedure: RIGHT TOTAL KNEE ARTHROPLASTY.;  Surgeon: Bao Vigil MD;  Location: [facility] MAIN OR;  Service: Orthopedics;  Laterality: Right;   • UPPER GASTROINTESTINAL ENDOSCOPY       PT Assessment (last 12 hours)     PT Evaluation and Treatment     Row Name 02/14/23 1142          Physical Therapy Time and Intention    Subjective Information complains of;pain  -RH     Document Type therapy note (daily note)  -RH     Mode of Treatment physical therapy;group therapy;individual therapy  -RH     Patient Effort good  -RH     Row Name 02/14/23 1142          Pain    Additional Documentation Pain Scale: FACES Pre/Post-Treatment (Group)  -RH     Row Name 02/14/23 1142          Pain Scale: FACES Pre/Post-Treatment    Pain: FACES Scale, Pretreatment 2-->hurts little bit  -RH     Posttreatment Pain Rating 2-->hurts little bit  -RH     Pain Location - Side/Orientation Right  -RH     Pain Location - knee  -RH     Row Name 02/14/23 1140          Range of Motion (ROM)    Range of Motion --  Pt R knee AAROM at 95 degrees flex and 8 degrees ext.   -     Row Name 02/14/23 1142          Strength (Manual Muscle Testing)    Strength (Manual Muscle Testing) --  Pt R knee ext strength at 3-/5.  -     Row Name 02/14/23 1142          Mobility    Extremity Weight-bearing Status right lower extremity  -RH     Right Lower Extremity (Weight-bearing Status) weight-bearing as tolerated (WBAT)  -     Row Name 02/14/23 1142          Transfers    Transfers sit-stand transfer;stand-sit transfer  -     Row Name 02/14/23 1142          Sit-Stand Transfer    Sit-Stand [place] (Transfers) contact guard  -     Assistive Device (Sit-Stand Transfers) walker, front-wheeled  -     Row Name 02/14/23 1142          Stand-Sit Transfer    Stand-Sit [place] (Transfers) contact guard  -     Assistive Device (Stand-Sit Transfers) walker, front-wheeled  -     Row Name 02/14/23 1142          Gait/Stairs (Locomotion)    Gait/Stairs Locomotion gait/ambulation independence;gait/ambulation assistive device;distance ambulated;gait pattern;gait deviations  -     [place] Level (Gait) contact guard  -     Assistive Device (Gait) walker, front-wheeled  -     Distance in Feet (Gait) 165  -     Pattern (Gait) --  Pt able to achieve and maintain reciprocal gait.  -RH     Deviations/Abnormal Patterns (Gait) base of support, wide;gait speed decreased;stride length decreased  -RH     Right Sided Gait Deviations heel strike decreased  -RH     Gait Assessment/Intervention Pt amb with RW and CGA with reciprocal gait and decreased R LE heel strike.  -RH     Negotiation (Stairs) stairs independence;stairs assistive device;handrail location;number of steps;descending technique;ascending technique  -RH     [place] Level (Stairs) contact guard  -     Handrail Location (Stairs) both sides  -RH     Number of Steps (Stairs) 5  -RH     Ascending Technique (Stairs) step-to-step  -RH     Descending Technique (Stairs) step-to-step  -RH     Row Name 02/14/23 1142          Balance     Dynamic Standing Balance contact guard  -RH     Position/Device Used, Standing Balance walker, front-wheeled  -RH     Row Name             Wound 02/13/23 1243 Right anterior knee Incision    Wound - Properties Group Placement Date: 02/13/23  -BW Placement Time: 1243  -BW Present on Hospital Admission: N  -BW Side: Right  -BW Orientation: anterior  -BW Location: knee  -BW Primary Wound Type: Incision  -BW    Retired Wound - Properties Group Placement Date: 02/13/23  -BW Placement Time: 1243  -BW Present on Hospital Admission: N  -BW Side: Right  -BW Orientation: anterior  -BW Location: knee  -BW Primary Wound Type: Incision  -BW    Retired Wound - Properties Group Date first assessed: 02/13/23  -BW Time first assessed: 1243  -BW Present on Hospital Admission: N  -BW Side: Right  -BW Location: knee  -BW Primary Wound Type: Incision  -BW    Row Name 02/14/23 1142          Progress Summary (PT)    Progress Toward Functional Goals (PT) progress toward functional goals is good  -RH           User Key  (r) = Recorded By, (t) = Taken By, (c) = Cosigned By    Initials Name Provider Type    BW Kevin Taylor, RN Registered Nurse    RH Jere Dukes PTA Physical Therapist Assistant             Right Knee Ther-ex   Exercise  Reps  Sets    Long arc Quads   10 2   Short arc Quads  10 2   Heel Slides  10 2   Ankle Pumps  10 2   Quad sets  10 2   Glut sets  10 2   Straight leg raise  10 2              PT Recommendation and Plan     Progress Summary (PT)  Progress Toward Functional Goals (PT): progress toward functional goals is good   Outcome Measures     Row Name 02/14/23 1100 02/13/23 1500          How much help from another person do you currently need...    Turning from your back to your side while in flat bed without using bedrails? 4  -RH 4  -FIFI     Moving from lying on back to sitting on the side of a flat bed without bedrails? 4  -RH 4  -FIFI     Moving to and from a bed to a chair (including a wheelchair)? 3  -RH 3   -FIFI     Standing up from a chair using your arms (e.g., wheelchair, bedside chair)? 4  -RH 3  -FIFI     Climbing 3-5 steps with a railing? 3  -RH 3  -FIFI     To walk in hospital room? 4  -RH 3  -FIFI     AM-PAC 6 Clicks Score (PT) 22  -RH 20  -FIFI        Functional Assessment    Outcome Measure Options -- AM-PAC 6 Clicks Basic Mobility (PT)  -FIFI           User Key  (r) = Recorded By, (t) = Taken By, (c) = Cosigned By    Initials Name Provider Type     Jere Dukes PTA Physical Therapist Assistant    Ebenezer De Leon, PT Physical Therapist                 Time Calculation:    PT Charges     Row Name 02/14/23 1141             Time Calculation    PT Received On 02/14/23  -RH         Timed Charges    90303 - Gait Training Minutes  9  -RH      84650 - PT Therapeutic Activity Minutes 4  -RH         Untimed Charges    PT Group Therapy Minutes 30  -RH         Total Minutes    Timed Charges Total Minutes 13  -RH      Untimed Charges Total Minutes 30  -RH       Total Minutes 43  -RH            User Key  (r) = Recorded By, (t) = Taken By, (c) = Cosigned By    Initials Name Provider Type     Jere Dukes PTA Physical Therapist Assistant              Therapy Charges for Today     Code Description Service Date Service Provider Modifiers Qty    67975337585 HC GAIT TRAINING EA 15 MIN 2/14/2023 Jere Dukes PTA GP 1    66544732083 HC PT THER PROC GROUP 2/14/2023 Jere Dukes PTA GP 1          PT G-Codes  Outcome Measure Options: AM-PAC 6 Clicks Daily Activity (OT), Optimal Instrument  AM-PAC 6 Clicks Score (PT): 22  AM-PAC 6 Clicks Score (OT): 20    Jere Dukes PTA  2/14/2023

## 2023-02-14 NOTE — PROGRESS NOTES
Orthopedic Total Knee Progress Note    Assessment/Plan requesting home health, follow-up 2 weeks, DVT prophylaxis, PT/OT, weight-bear as tolerated    Status post-right total knee arthroplasty: Doing well postoperatively.    Pain Relief: some relief    Continues current post-op course    Activity: up with assistance    Weight Bearing: WBAT     LOS: 0 days     Subjective     Post-Operative Day: 1 post-right total knee arthroplasty  Systemic or Specific Complaints: No Complaints    Objective     Vital signs in last 24 hours:  Vitals:    02/13/23 2100 02/13/23 2119 02/14/23 0011 02/14/23 0132   BP: 149/69   136/74   BP Location: Right arm   Right arm   Patient Position: Sitting   Sitting   Pulse: 60 71 70 59   Resp: 13 19  18   Temp: 97.6 °F (36.4 °C)   98.5 °F (36.9 °C)   TempSrc: Oral   Oral   SpO2: 93% 94% 97% 95%   Weight:       Height:            General: alert, appears stated age, and cooperative   Neurovascular: Tibial nerve: Intact, Superficial peroneal nerve: Intact, and Deep peroneal nerve: Intact  Capillary refill: Normal   Wound: Wound clean and dry no evidence of infection.   Range of Motion: Limited flexsion and Limited extension   DVT Exam: No evidence of DVT seen on physical exam.      WBC   Date Value Ref Range Status   02/14/2023 8.85 3.40 - 10.80 10*3/mm3 Final     RBC   Date Value Ref Range Status   02/14/2023 4.27 4.14 - 5.80 10*6/mm3 Final     Hemoglobin   Date Value Ref Range Status   02/14/2023 11.6 (L) 13.0 - 17.7 g/dL Final     Hematocrit   Date Value Ref Range Status   02/14/2023 35.8 (L) 37.5 - 51.0 % Final     MCV   Date Value Ref Range Status   02/14/2023 83.8 79.0 - 97.0 fL Final     MCH   Date Value Ref Range Status   02/14/2023 27.2 26.6 - 33.0 pg Final     MCHC   Date Value Ref Range Status   02/14/2023 32.4 31.5 - 35.7 g/dL Final     RDW   Date Value Ref Range Status   02/14/2023 23.5 (H) 12.3 - 15.4 % Final     RDW-SD   Date Value Ref Range Status   02/14/2023 68.2 (H) 37.0 - 54.0 fl  Final     MPV   Date Value Ref Range Status   02/14/2023 11.5 6.0 - 12.0 fL Final     Platelets   Date Value Ref Range Status   02/14/2023 142 140 - 450 10*3/mm3 Final        Basic Metabolic Panel    Sodium No results found for: NA   Potassium No results found for: K   Chloride No results found for: CL   Bicarbonate No results found for: PLASMABICARB   BUN No results found for: BUN   Creatinine No results found for: CREATININE   Calcium No results found for: CALCIUM   Glucose      No components found for: GLUCOSE.*      XR Knee 1 or 2 View Right   Final Result    Status post total knee arthroplasty.  Expected, immediate postoperative appearance.                    KP COY MD          Electronically Signed and Approved By: KP COY MD on 2/13/2023 at 14:43

## 2023-02-14 NOTE — TELEPHONE ENCOUNTER
I SPOKE WITH RUTHANN, DAUGHTER OF MR SILVA. PICTURE OF SURGICAL DRESSING WAS SENT FOR REVIEW BY DR HAN.   PER DR HAN PATIENT IS REINFORCE DRESSING WITH 2 ABD PADS, TAPE AND SABRINA STOCKING.  DO NOT REMOVE SURGICAL DRESSING.  PATIENT IS TO ICE LEG AND ELEVATE.  NO EXERCISES UNTIL AFTER PATIENT IS SEEN IN OFFICE ON Wednesday FOR DRESSING CHANGE AND WOUND EXAM.  PATIENT IS TO HOLD ELIQUIS UNTIL Thursday.  PATIENT AND DAUGHTER VOICE UNDERSTANDING AND WILL CALL THE OFFICE WITH FURTHER CONCERNS OR ISSUES.

## 2023-02-14 NOTE — TELEPHONE ENCOUNTER
Caller: Percy Velarde     Relationship: SELF     Best call back number: [phone]    What is your medical concern? PT HAD SURGERY TODAY WITH DR. HAN. PT DAUGHTER RUTHANN IS CALLING CONCERNING OF PT DRESSING FULL OF BLOOD. RUTHANN SAID PT WAS INSTRUCTED NOT TO TOUCH THE DRESSING UNTIL 02/16. UNABLE TO WARM TRANSFER. PLEASE ADVISE

## 2023-02-14 NOTE — PLAN OF CARE
Goal Outcome Evaluation:    AAO X4. S/P SURGERY. TOLERATING DIET, ROOM AIR, & ACTIVITY.

## 2023-02-14 NOTE — PLAN OF CARE
Goal Outcome Evaluation:  Plan of Care Reviewed With: patient        Progress: improving    Patient stable throughout shift and resting. Voiding without difficulty. Medicated for pain with scheduled and PRN pain medication with relief voiced. Ambulated with nursing staff this shift x1 assist with walker approximately 170ft. Anticipates discharge today with home health.

## 2023-02-14 NOTE — THERAPY TREATMENT NOTE
Acute Care - Physical Therapy Treatment Note   Frank     Patient Name: Percy Velarde  : 1950  MRN: [id number]  Today's Date: 2023      Visit Dx:     ICD-10-CM ICD-9-CM   1. Difficulty in walking  R26.2 719.7   2. Bilateral primary osteoarthritis of knee  M17.0 715.16   3. Osteoarthritis of knee, unspecified laterality, unspecified osteoarthritis type  M17.9 715.36   4. Decreased activities of daily living (ADL)  Z78.9 V49.89     Patient Active Problem List   Diagnosis   • Gastroesophageal reflux disease   • Hyperlipemia   • Hypertension, essential   • Hypothyroidism   • Coronary artery disease involving native heart without angina pectoris   • Sick sinus syndrome (HCC)   • SOB (shortness of breath) on exertion   • Family history of colon cancer   • OA (osteoarthritis) of knee   • Bilateral primary osteoarthritis of knee     Past Medical History:   Diagnosis Date   • Anemia    • Arthritis    • COPD (chronic obstructive pulmonary disease) (HCC)    • Coronary artery disease involving native heart without angina pectoris 2021    Follows with Dr. Winters   • ETD (Eustachian tube dysfunction), bilateral    • GERD (gastroesophageal reflux disease)    • Gout     no issues for several years   • H/O arthroscopy of shoulder 2017    aftercare following, right    • Hearing loss    • History of arthroscopy of right shoulder 2018   • Hyperlipemia    • Hypertension, essential    • Hypothyroidism    • Limb swelling     feet ankles   • Lipoma     removed   • Low back pain    • Melanoma (HCC)     left arm removed   • Nasal septal ulcer    • Otalgia    • Personal history of colonic polyps 2014   • Recurrent epistaxis     no current issues   • Rotator cuff tear arthropathy of right shoulder 2017    repaired   • S/P TKR (total knee replacement), right     2023   • Seasonal allergies    • Sleep apnea     does not use CPAP   • Tinnitus, bilateral      Past Surgical History:   Procedure  Laterality Date   • CHOLECYSTECTOMY     • COLONOSCOPY  12/10/2019    DR. LANE   • COLONOSCOPY N/A 02/06/2023    Procedure: COLONOSCOPY WITH COLD SNARE POLYPECTOMY;  Surgeon: Mich Lane MD;  Location: [facility] ENDOSCOPY;  Service: Gastroenterology;  Laterality: N/A;  COLON POLYP. DIVERTICULOSIS   • ENDOSCOPY     • ENDOSCOPY N/A 09/20/2021    Procedure: ESOPHAGOGASTRODUODENOSCOPY with biopsy;  Surgeon: Mich Lane MD;  Location: [facility] ENDOSCOPY;  Service: Gastroenterology;  Laterality: N/A;  gastric polyps   • LIPOMA EXCISION  02/26/2019    removal  neck   • MYRINGOTOMY W/ TUBES      states had had several pairs placed by Dr. Kaba    • OTHER SURGICAL HISTORY Left 2013    melanoma excision from shoulder    • ROTATOR CUFF REPAIR Right 2017   • TONSILLECTOMY     • TOTAL KNEE ARTHROPLASTY Right 2/13/2023    Procedure: RIGHT TOTAL KNEE ARTHROPLASTY.;  Surgeon: Bao Vigil MD;  Location: [facility] MAIN OR;  Service: Orthopedics;  Laterality: Right;   • UPPER GASTROINTESTINAL ENDOSCOPY       PT Assessment (last 12 hours)     PT Evaluation and Treatment     Row Name 02/14/23 1400 02/14/23 1142       Physical Therapy Time and Intention    Subjective Information no complaints  -FIFI complains of;pain  -RH    Document Type therapy note (daily note)  -FIFI therapy note (daily note)  -RH    Mode of Treatment individual therapy;physical therapy  -FIFI physical therapy;group therapy;individual therapy  -RH    Patient Effort -- good  -RH    Row Name 02/14/23 1142          Pain    Additional Documentation Pain Scale: FACES Pre/Post-Treatment (Group)  -RH     Row Name 02/14/23 1142          Pain Scale: FACES Pre/Post-Treatment    Pain: FACES Scale, Pretreatment 2-->hurts little bit  -RH     Posttreatment Pain Rating 2-->hurts little bit  -RH     Pain Location - Side/Orientation Right  -RH     Pain Location - knee  -RH     Row Name 02/14/23 1142          Range of Motion (ROM)    Range of Motion --  Pt R knee AAROM  at 95 degrees flex and 8 degrees ext.  -     Row Name 02/14/23 1142          Strength (Manual Muscle Testing)    Strength (Manual Muscle Testing) --  Pt R knee ext strength at 3-/5.  -     Row Name 02/14/23 1142          Mobility    Extremity Weight-bearing Status right lower extremity  -RH     Right Lower Extremity (Weight-bearing Status) weight-bearing as tolerated (WBAT)  -     Row Name 02/14/23 1400 02/14/23 1142       Transfers    Transfers sit-stand transfer  -FIFI sit-stand transfer;stand-sit transfer  -    Row Name 02/14/23 1400 02/14/23 1142       Sit-Stand Transfer    Sit-Stand [place] (Transfers) contact guard  -FIFI contact guard  -    Assistive Device (Sit-Stand Transfers) walker, front-wheeled  -FIFI walker, front-wheeled  -    Row Name 02/14/23 1142          Stand-Sit Transfer    Stand-Sit [place] (Transfers) contact guard  -     Assistive Device (Stand-Sit Transfers) walker, front-wheeled  -     Row Name 02/14/23 1400 02/14/23 1142       Gait/Stairs (Locomotion)    Gait/Stairs Locomotion gait/ambulation independence  -FIFI gait/ambulation independence;gait/ambulation assistive device;distance ambulated;gait pattern;gait deviations  -    [place] Level (Gait) contact guard  -FIFI contact guard  -    Assistive Device (Gait) walker, front-wheeled  -FIFI walker, front-wheeled  -    Distance in Feet (Gait) 150  -FIFI 165  -    Pattern (Gait) step-through  -FIFI --  Pt able to achieve and maintain reciprocal gait.  -    Deviations/Abnormal Patterns (Gait) -- base of support, wide;gait speed decreased;stride length decreased  -    Right Sided Gait Deviations -- heel strike decreased  -    Gait Assessment/Intervention -- Pt amb with RW and CGA with reciprocal gait and decreased R LE heel strike.  -    Negotiation (Stairs) -- stairs independence;stairs assistive device;handrail location;number of steps;descending technique;ascending technique  -    [place] Level (Stairs)  -- contact guard  -RH    Handrail Location (Stairs) -- both sides  -RH    Number of Steps (Stairs) -- 5  -RH    Ascending Technique (Stairs) -- step-to-step  -RH    Descending Technique (Stairs) -- step-to-step  -RH    Row Name 02/14/23 1400 02/14/23 1142       Balance    Dynamic Standing Balance contact guard  -FIFI contact guard  -RH    Position/Device Used, Standing Balance walker, front-wheeled  -FIFI walker, front-wheeled  -RH    Row Name             Wound 02/13/23 1243 Right anterior knee Incision    Wound - Properties Group Placement Date: 02/13/23  -BW Placement Time: 1243  -BW Present on Hospital Admission: N  -BW Side: Right  -BW Orientation: anterior  -BW Location: knee  -BW Primary Wound Type: Incision  -BW    Retired Wound - Properties Group Placement Date: 02/13/23  -BW Placement Time: 1243  -BW Present on Hospital Admission: N  -BW Side: Right  -BW Orientation: anterior  -BW Location: knee  -BW Primary Wound Type: Incision  -BW    Retired Wound - Properties Group Date first assessed: 02/13/23  -BW Time first assessed: 1243  -BW Present on Hospital Admission: N  -BW Side: Right  -BW Location: knee  -BW Primary Wound Type: Incision  -BW    Row Name 02/14/23 1400 02/14/23 1142       Progress Summary (PT)    Progress Toward Functional Goals (PT) progress toward functional goals is good  -FIFI progress toward functional goals is good  -RH          User Key  (r) = Recorded By, (t) = Taken By, (c) = Cosigned By    Initials Name Provider Type    BW Kevin Taylor RN Registered Nurse    Jere Almodovar PTA Physical Therapist Assistant    Ebenezer De Leon, PT Physical Therapist             Right Knee Ther-ex   Exercise  Reps  Sets    Long arc Quads   10 2   Short arc Quads  10 2   Heel Slides  10 2   Ankle Pumps  10 2   Quad sets  10 2   Glut sets  10 2   Straight leg raise  10 2              PT Recommendation and Plan  Anticipated Discharge Disposition (PT): home with home health  Planned Therapy  Interventions (PT): balance training, bed mobility training, gait training, strengthening, transfer training  Therapy Frequency (PT): 2 times/day  Progress Summary (PT)  Progress Toward Functional Goals (PT): progress toward functional goals is good  Plan of Care Reviewed With: patient  Outcome Evaluation: Pt demonstrates strength, mobility, and ROM deficits. He will benefit from in patient PT services. Recommend discharge to home with home health following discharge from hospital.   Outcome Measures     Row Name 02/14/23 1100 02/13/23 1500          How much help from another person do you currently need...    Turning from your back to your side while in flat bed without using bedrails? 4  -RH 4  -FIFI     Moving from lying on back to sitting on the side of a flat bed without bedrails? 4  -RH 4  -FIFI     Moving to and from a bed to a chair (including a wheelchair)? 3  -RH 3  -FIFI     Standing up from a chair using your arms (e.g., wheelchair, bedside chair)? 4  -RH 3  -FIFI     Climbing 3-5 steps with a railing? 3  -RH 3  -FIFI     To walk in hospital room? 4  -RH 3  -FIFI     AM-PAC 6 Clicks Score (PT) 22  -RH 20  -FIFI        Functional Assessment    Outcome Measure Options -- AM-PAC 6 Clicks Basic Mobility (PT)  -FIFI           User Key  (r) = Recorded By, (t) = Taken By, (c) = Cosigned By    Initials Name Provider Type    RH Jere Dukes, PTA Physical Therapist Assistant    Ebenezer De Leon, PT Physical Therapist                 Time Calculation:    PT Charges     Row Name 02/14/23 1447 02/14/23 1141          Time Calculation    PT Received On 02/14/23  -FIFI 02/14/23  -RH        Timed Charges    99588 - PT Therapeutic Exercise Minutes 15  -FIFI --     89498 - Gait Training Minutes  10  -FIFI 9  -RH     28323 - PT Therapeutic Activity Minutes -- 4  -RH        Untimed Charges    PT Group Therapy Minutes -- 30  -RH        Total Minutes    Timed Charges Total Minutes 25  -FIFI 13  -RH     Untimed Charges Total Minutes -- 30  -RH       Total Minutes 25  -FIFI 43  -RH           User Key  (r) = Recorded By, (t) = Taken By, (c) = Cosigned By    Initials Name Provider Type     Jere Dukes, PTA Physical Therapist Assistant    Ebenezer De Leon, PT Physical Therapist              Therapy Charges for Today     Code Description Service Date Service Provider Modifiers Qty    39352881966 HC PT EVAL LOW COMPLEXITY 2 2/13/2023 Ebenezer Hay, PT GP 1    35656728234 HC PT THER PROC EA 15 MIN 2/14/2023 Ebenezer Hay, PT GP 1    52166038567 HC GAIT TRAINING EA 15 MIN 2/14/2023 Ebenezer Hay, PT GP 1          PT G-Codes  Outcome Measure Options: AM-PAC 6 Clicks Daily Activity (OT), Optimal Instrument  AM-PAC 6 Clicks Score (PT): 22  AM-PAC 6 Clicks Score (OT): 20    Ebenezer Hay, PT  2/14/2023

## 2023-02-15 DIAGNOSIS — Z47.1 AFTERCARE FOLLOWING RIGHT KNEE JOINT REPLACEMENT SURGERY: Primary | ICD-10-CM

## 2023-02-15 DIAGNOSIS — Z96.651 AFTERCARE FOLLOWING RIGHT KNEE JOINT REPLACEMENT SURGERY: Primary | ICD-10-CM

## 2023-02-15 RX ORDER — CEPHALEXIN 500 MG/1
500 CAPSULE ORAL 3 TIMES DAILY
Qty: 9 CAPSULE | Refills: 0 | Status: SHIPPED | OUTPATIENT
Start: 2023-02-15 | End: 2023-02-18

## 2023-02-15 NOTE — THERAPY DISCHARGE NOTE
Inpatient Rehabilitation - Occupational Therapy Discharge  [facility]    Patient Name: Percy Velarde  : 1950    MRN: [id number]                              Today's Date: 2/15/2023       Admit Date: 2023    Visit Dx:     ICD-10-CM ICD-9-CM   1. Difficulty in walking  R26.2 719.7   2. Bilateral primary osteoarthritis of knee  M17.0 715.16   3. Osteoarthritis of knee, unspecified laterality, unspecified osteoarthritis type  M17.9 715.36   4. Decreased activities of daily living (ADL)  Z78.9 V49.89     Patient Active Problem List   Diagnosis   • Gastroesophageal reflux disease   • Hyperlipemia   • Hypertension, essential   • Hypothyroidism   • Coronary artery disease involving native heart without angina pectoris   • Sick sinus syndrome (HCC)   • SOB (shortness of breath) on exertion   • Family history of colon cancer   • OA (osteoarthritis) of knee   • Bilateral primary osteoarthritis of knee     Past Medical History:   Diagnosis Date   • Anemia    • Arthritis    • COPD (chronic obstructive pulmonary disease) (HCC)    • Coronary artery disease involving native heart without angina pectoris 2021    Follows with Dr. Winters   • ETD (Eustachian tube dysfunction), bilateral    • GERD (gastroesophageal reflux disease)    • Gout     no issues for several years   • H/O arthroscopy of shoulder 2017    aftercare following, right    • Hearing loss    • History of arthroscopy of right shoulder 2018   • Hyperlipemia    • Hypertension, essential    • Hypothyroidism    • Limb swelling     feet ankles   • Lipoma     removed   • Low back pain    • Melanoma (HCC)     left arm removed   • Nasal septal ulcer    • Otalgia    • Personal history of colonic polyps 2014   • Recurrent epistaxis     no current issues   • Rotator cuff tear arthropathy of right shoulder 2017    repaired   • S/P TKR (total knee replacement), right     2023   • Seasonal allergies    • Sleep apnea     does not use CPAP    • Tinnitus, bilateral      Past Surgical History:   Procedure Laterality Date   • CHOLECYSTECTOMY     • COLONOSCOPY  12/10/2019    DR. LANE   • COLONOSCOPY N/A 02/06/2023    Procedure: COLONOSCOPY WITH COLD SNARE POLYPECTOMY;  Surgeon: Mich Lane MD;  Location: [facility] ENDOSCOPY;  Service: Gastroenterology;  Laterality: N/A;  COLON POLYP. DIVERTICULOSIS   • ENDOSCOPY     • ENDOSCOPY N/A 09/20/2021    Procedure: ESOPHAGOGASTRODUODENOSCOPY with biopsy;  Surgeon: Mich Lane MD;  Location: [facility] ENDOSCOPY;  Service: Gastroenterology;  Laterality: N/A;  gastric polyps   • LIPOMA EXCISION  02/26/2019    removal  neck   • MYRINGOTOMY W/ TUBES      states had had several pairs placed by Dr. Kaba    • OTHER SURGICAL HISTORY Left 2013    melanoma excision from shoulder    • ROTATOR CUFF REPAIR Right 2017   • TONSILLECTOMY     • TOTAL KNEE ARTHROPLASTY Right 2/13/2023    Procedure: RIGHT TOTAL KNEE ARTHROPLASTY.;  Surgeon: Bao Vigil MD;  Location: [facility] MAIN OR;  Service: Orthopedics;  Laterality: Right;   • UPPER GASTROINTESTINAL ENDOSCOPY        General Information     Row Name 02/15/23 0539          OT Time and Intention    Document Type discharge evaluation/summary  -PG     Mode of Treatment occupational therapy  -PG           User Key  (r) = Recorded By, (t) = Taken By, (c) = Cosigned By    Initials Name Provider Type    PG Dallas Osorio OT Occupational Therapist               Mobility/ADL's    No documentation.                Obj/Interventions    No documentation.                Goals/Plan     Row Name 02/15/23 0540          Transfer Goal 1 (OT)    Progress/Outcome (Transfer Goal 1, OT) discharged from facility  -PG     Row Name 02/15/23 0540          Bathing Goal 1 (OT)    Progress/Outcomes (Bathing Goal 1, OT) discharged from facility  -PG     Row Name 02/15/23 0540          Dressing Goal 1 (OT)    Progress/Outcome (Dressing Goal 1, OT) discharged from facility  -PG     Row Name  02/15/23 0540          Toileting Goal 1 (OT)    Progress/Outcome (Toileting Goal 1, OT) discharged from facility  -PG     Row Name 02/15/23 0540          Grooming Goal 1 (OT)    Progress/Outcome (Grooming Goal 1, OT) discharged from facility  -PG           User Key  (r) = Recorded By, (t) = Taken By, (c) = Cosigned By    Initials Name Provider Type    PG Dallas Osorio OT Occupational Therapist               Clinical Impression    No documentation.                Outcome Measures    No documentation.               Occupational Therapy Education     Title: PT OT SLP Therapies (Resolved)     Topic: Occupational Therapy (Resolved)     Point: ADL training (Resolved)     Description:   Instruct learner(s) on proper safety adaptation and remediation techniques during self care or transfers.   Instruct in proper use of assistive devices.              Learning Progress Summary           Patient Acceptance, E,D, DU by PG at 2/14/2023 0901    Acceptance, E,TB,D, VU,DU by TB at 2/14/2023 0820                   Point: Home exercise program (Resolved)     Description:   Instruct learner(s) on appropriate technique for monitoring, assisting and/or progressing therapeutic exercises/activities.              Learning Progress Summary           Patient Acceptance, E,D, DU by PG at 2/14/2023 0901    Acceptance, E,TB,D, VU,DU by TB at 2/14/2023 0820                   Point: Precautions (Resolved)     Description:   Instruct learner(s) on prescribed precautions during self-care and functional transfers.              Learning Progress Summary           Patient Acceptance, E,D, DU by PG at 2/14/2023 0901    Acceptance, E,TB,D, VU,DU by TB at 2/14/2023 0820                   Point: Body mechanics (Resolved)     Description:   Instruct learner(s) on proper positioning and spine alignment during self-care, functional mobility activities and/or exercises.              Learning Progress Summary           Patient Acceptance, E,D, DU by PG at  2/14/2023 0901    Acceptance, E,TB,D, VU,DU by TB at 2/14/2023 0820                               User Key     Initials Effective Dates Name Provider Type Discipline    TB 09/07/21 -  Lynne Mallory, NICK Registered Nurse Nurse    PG 06/16/21 -  Dallas Osorio OT Occupational Therapist OT              OT Recommendation and Plan  Planned Therapy Interventions (OT): activity tolerance training, BADL retraining, strengthening exercise, transfer/mobility retraining, occupation/activity based interventions, patient/caregiver education/training  Therapy Frequency (OT): 5 times/wk  Plan of Care Review  Plan of Care Reviewed With: patient  Progress: no change  Outcome Evaluation: Patient presents with limitations affecting strength, activity tolerance, and balance impacting patient's ability to return home safely and independently.  The skills of a therapist will be required to safely and effectively implement the following treatment plan to restore maximal level of function  Plan of Care Reviewed With: patient  Outcome Evaluation: Patient presents with limitations affecting strength, activity tolerance, and balance impacting patient's ability to return home safely and independently.  The skills of a therapist will be required to safely and effectively implement the following treatment plan to restore maximal level of function     Time Calculation:     Therapy Charges for Today     Code Description Service Date Service Provider Modifiers Qty    01485952831  OT THERAPEUTIC ACT EA 15 MIN 2/14/2023 Dallas Osorio OT GO 1    13588062095  OT SELF CARE/MGMT/TRAIN EA 15 MIN 2/14/2023 Dallas Osorio OT GO 1    95003321369  OT EVAL LOW COMPLEXITY 2 2/14/2023 Dallas Osorio OT GO 1             OT Discharge Summary  Anticipated Discharge Disposition (OT): home with home health  Reason for Discharge: Discharge from facility  Outcomes Achieved: Discharge from facility occurred on same date as evluation  Discharge Destination: Home with  home health, Home with assist    Dallas Osorio, OT  2/15/2023

## 2023-02-15 NOTE — TELEPHONE ENCOUNTER
PATIENT PRESENTED TO THE OFFICE WITH DAUGHTER CLARIBEL FOR INCISION CHECK. DAUGHTER STATES HE SOAKED COMPLETELY THROUGH 2 ABD PAD DRESSINGS OVERNIGHT, BUT WAS UP AND DOWN A LOT TO THE BATHROOM SO THAT MAY HAVE CONTRIBUTED TO THE PERSISTENT DRAINAGE/BLEEDING. INCISION WAS ONLY BLEEDING MINIMALLY FROM 2 SPOTS UPON INSPECTION TODAY. DR. GONZALEZ MET WITH PATIENT AND EVALUATED INCISION. INCISION WAS CLEANED/PREPPED WITH ALCOHOL PADS BEFORE DR. GONZALEZ ADDED 4 STAPLES TO LOWER PORTION OF INCISION. FOLLOWING STAPLING OF INCISION, SKINAFFIX TOPICAL SKIN GLUE WAS APPLIED BY DR. GONZALEZ ALONG THE ENTIRE INCISION. ONCE GLUE WAS DRY I APPLIED A NEW AQUACEL DRESSING. PATIENT AND DAUGHTER ADVISED TO LEAVE DRESSING INTACT FOR 3-5 DAYS. EXTRA ABD PADS, PAPER TAPE, OPTI-FOAM DRESSINGS, AND SABRINA HOSE WERE GIVEN TO PATIENT. PER DR. HAN KEFLEX 500MG TID FOR 3 DA[facility] [place]. PATIENT AND DAUGHTER WILL MONITOR INCISION AND WILL CALL WITH ANY CONCERNS.

## 2023-02-16 ENCOUNTER — TELEPHONE (OUTPATIENT)
Dept: ORTHOPEDIC SURGERY | Facility: CLINIC | Age: 73
End: 2023-02-16
Payer: COMMERCIAL

## 2023-02-16 DIAGNOSIS — R26.2 DIFFICULTY IN WALKING: ICD-10-CM

## 2023-02-16 NOTE — TELEPHONE ENCOUNTER
POST OP PHONE CALL  PATIENT WILL BE OUT OF PAIN MEDS OVER THE WEEKEND.  PATIENT REQUESTING [facility] IN [place]

## 2023-02-17 RX ORDER — HYDROCODONE BITARTRATE AND ACETAMINOPHEN 7.5; 325 MG/1; MG/1
1 TABLET ORAL EVERY 4 HOURS PRN
Qty: 42 TABLET | Refills: 0 | Status: SHIPPED | OUTPATIENT
Start: 2023-02-17 | End: 2023-02-22 | Stop reason: SDUPTHER

## 2023-02-18 LAB — QT INTERVAL: 448 MS

## 2023-02-22 ENCOUNTER — TELEPHONE (OUTPATIENT)
Dept: ORTHOPEDIC SURGERY | Facility: CLINIC | Age: 73
End: 2023-02-22
Payer: COMMERCIAL

## 2023-02-22 DIAGNOSIS — R26.2 DIFFICULTY IN WALKING: ICD-10-CM

## 2023-02-22 RX ORDER — HYDROCODONE BITARTRATE AND ACETAMINOPHEN 7.5; 325 MG/1; MG/1
1 TABLET ORAL EVERY 4 HOURS PRN
Qty: 42 TABLET | Refills: 0 | Status: SHIPPED | OUTPATIENT
Start: 2023-02-22 | End: 2023-03-01

## 2023-03-01 ENCOUNTER — TELEPHONE (OUTPATIENT)
Dept: ORTHOPEDIC SURGERY | Facility: CLINIC | Age: 73
End: 2023-03-01
Payer: COMMERCIAL

## 2023-03-01 DIAGNOSIS — R26.2 DIFFICULTY IN WALKING: ICD-10-CM

## 2023-03-01 RX ORDER — HYDROCODONE BITARTRATE AND ACETAMINOPHEN 7.5; 325 MG/1; MG/1
TABLET ORAL
Qty: 42 TABLET | Refills: 0 | Status: SHIPPED | OUTPATIENT
Start: 2023-03-01 | End: 2023-03-14

## 2023-03-01 NOTE — TELEPHONE ENCOUNTER
Caller: MIC EASLEY    Relationship: DAUGHTER (ON [facility])    Best call back number: [phone]    What orders are you requesting (i.e. lab or imaging): PHYSICAL THERAPY - RIGHT KNEE POST-OP    In what timeframe would the patient need to come in: ASAP    Where will you receive your lab/imaging services: [facility] IN [place]    Additional notes: BEING DISCHARGED FROM HOME HEALTH TODAY

## 2023-03-02 ENCOUNTER — OFFICE VISIT (OUTPATIENT)
Dept: ORTHOPEDIC SURGERY | Facility: CLINIC | Age: 73
End: 2023-03-02
Payer: MEDICARE

## 2023-03-02 VITALS — HEART RATE: 78 BPM | BODY MASS INDEX: 37.18 KG/M2 | WEIGHT: 251 LBS | HEIGHT: 69 IN | OXYGEN SATURATION: 97 %

## 2023-03-02 DIAGNOSIS — Z47.1 AFTERCARE FOLLOWING RIGHT KNEE JOINT REPLACEMENT SURGERY: ICD-10-CM

## 2023-03-02 DIAGNOSIS — Z96.651 AFTERCARE FOLLOWING RIGHT KNEE JOINT REPLACEMENT SURGERY: ICD-10-CM

## 2023-03-02 DIAGNOSIS — M25.561 RIGHT KNEE PAIN, UNSPECIFIED CHRONICITY: Primary | ICD-10-CM

## 2023-03-02 PROCEDURE — 99024 POSTOP FOLLOW-UP VISIT: CPT | Performed by: ORTHOPAEDIC SURGERY

## 2023-03-02 RX ORDER — CEPHALEXIN 500 MG/1
500 CAPSULE ORAL 3 TIMES DAILY
Qty: 9 CAPSULE | Refills: 0 | Status: SHIPPED | OUTPATIENT
Start: 2023-03-02

## 2023-03-02 NOTE — PROGRESS NOTES
"Chief Complaint  Follow-up of the Right Knee and Suture / Staple Removal      Subjective      Percy Velarde presents to [facility] for follow up of the right knee. He had a right total knee arthroplasty on 23.  He is in home health physical therapy and doing well. He is using a cane for support and stability.  He doesn't use the cane at home. He starts at [facility] on Monday.  He had his staples removed today.      No Known Allergies     Social History     Socioeconomic History   • Marital status:    Tobacco Use   • Smoking status: Former     Packs/day: 0.50     Years: 12.00     Pack years: 6.00     Types: Cigarettes     Quit date:      Years since quittin.1   • Smokeless tobacco: Former     Types: Chew     Quit date:    • Tobacco comments:     QUIT    Vaping Use   • Vaping Use: Never used   Substance and Sexual Activity   • Alcohol use: Yes     Alcohol/week: 3.0 standard drinks     Types: 2 Cans of beer, 1 Shots of liquor per week     Comment: occ   • Drug use: Never   • Sexual activity: Defer        Review of Systems     Objective   Vital Signs:   Pulse 78   Ht 175.3 cm (69\")   Wt 114 kg (251 lb)   SpO2 97%   BMI 37.07 kg/m²       Physical Exam  Constitutional:       Appearance: Normal appearance. Patient is well-developed and normal weight.   HENT:      Head: Normocephalic.      Right Ear: Hearing and external ear normal.      Left Ear: Hearing and external ear normal.      Nose: Nose normal.   Eyes:      Conjunctiva/sclera: Conjunctivae normal.   Cardiovascular:      Rate and Rhythm: Normal rate.   Pulmonary:      Effort: Pulmonary effort is normal.      Breath sounds: No wheezing or rales.   Abdominal:      Palpations: Abdomen is soft.      Tenderness: There is no abdominal tenderness.   Musculoskeletal:      Cervical back: Normal range of motion.   Skin:     Findings: No rash.   Neurological:      Mental Status: Patient is alert and oriented to person, " place, and time.   Psychiatric:         Mood and Affect: Mood and affect normal.         Judgment: Judgment normal.       Ortho Exam      RIGHT KNEE Staples removed. No evidence of wound dehiscence, mild surrounding erythema, mild warmth, or no drainage. Flexion 90. Extension -3 Stable to varus/valgus stress. Stable to anterior/posterior drawer. Neurovascularly intact. Positive EHL, FHL, HS and TA. Sensation intact to light touch all 5 nerves of the foot.Patella is well tracking. Calf supple, non-tender. Good strength to hamstrings, quadriceps, dorsiflexors, and plantar flexors.  Knee Extensor Mechanism intact        Procedures      Imaging Results (Most Recent)     Procedure Component Value Units Date/Time    XR Knee 3 View Right [[id number]] Resulted: 03/02/23 0846     Updated: 03/02/23 0851           Result Review :         X-Ray Report:  Right knee X-Ray  Indication: Evaluation of the right knee.   AP/Lateral and [place] view(s)  Findings: Intact right knee replacement.  No signs of loosening, subsidence or periprosthetic fracture.   Prior studies available for comparison:Yes                     Assessment and Plan     Diagnoses and all orders for this visit:    1. Right knee pain, unspecified chronicity (Primary)  -     XR Knee 3 View Right    2. Aftercare following right knee joint replacement surgery        Discussed the treatment plan with the patient. Continue physical therapy. Modify activities as needed. Air out incision.  Prescribed Keflex    Call or return if worsening symptoms.    Follow Up    2 weeks.       Patient was given instructions and counseling regarding his condition or for health maintenance advice. Please see specific information pulled into the AVS if appropriate.     Scribed for Bao Vigil MD by Dasia Ribeiro MA.  03/02/23   08:27 EST    I have personally performed the services described in this document as scribed by the above individual and it is both accurate and complete.  Bao Vigil MD 03/02/23

## 2023-03-06 ENCOUNTER — HOSPITAL ENCOUNTER (EMERGENCY)
Facility: HOSPITAL | Age: 73
Discharge: HOME | End: 2023-03-07
Attending: EMERGENCY MEDICINE | Admitting: EMERGENCY MEDICINE
Payer: MEDICARE

## 2023-03-06 ENCOUNTER — APPOINTMENT (OUTPATIENT)
Dept: CARDIOLOGY | Facility: HOSPITAL | Age: 73
End: 2023-03-06
Payer: MEDICARE

## 2023-03-06 VITALS
HEART RATE: 57 BPM | BODY MASS INDEX: 35 KG/M2 | TEMPERATURE: 97.7 F | WEIGHT: 236.33 LBS | HEIGHT: 69 IN | SYSTOLIC BLOOD PRESSURE: 141 MMHG | DIASTOLIC BLOOD PRESSURE: 72 MMHG | RESPIRATION RATE: 20 BRPM | OXYGEN SATURATION: 95 %

## 2023-03-06 DIAGNOSIS — Z98.890 STATUS POST KNEE SURGERY: Primary | ICD-10-CM

## 2023-03-06 LAB
ALBUMIN SERPL-MCNC: 4 G/DL (ref 3.5–5.2)
ALBUMIN/GLOB SERPL: 1.4 G/DL
ALP SERPL-CCNC: 113 U/L (ref 39–117)
ALT SERPL W P-5'-P-CCNC: 21 U/L (ref 1–41)
ANION GAP SERPL CALCULATED.3IONS-SCNC: 10.7 MMOL/L (ref 5–15)
AST SERPL-CCNC: 26 U/L (ref 1–40)
BASOPHILS # BLD AUTO: 0.04 10*3/MM3 (ref 0–0.2)
BASOPHILS NFR BLD AUTO: 0.6 % (ref 0–1.5)
BILIRUB SERPL-MCNC: 0.4 MG/DL (ref 0–1.2)
BUN SERPL-MCNC: 16 MG/DL (ref 8–23)
BUN/CREAT SERPL: 12.6 (ref 7–25)
CALCIUM SPEC-SCNC: 9.2 MG/DL (ref 8.6–10.5)
CHLORIDE SERPL-SCNC: 99 MMOL/L (ref 98–107)
CO2 SERPL-SCNC: 24.3 MMOL/L (ref 22–29)
CREAT SERPL-MCNC: 1.27 MG/DL (ref 0.76–1.27)
DEPRECATED RDW RBC AUTO: 63.7 FL (ref 37–54)
EGFRCR SERPLBLD CKD-EPI 2021: 58.9 ML/MIN/1.73
EOSINOPHIL # BLD AUTO: 0.16 10*3/MM3 (ref 0–0.4)
EOSINOPHIL NFR BLD AUTO: 2.6 % (ref 0.3–6.2)
ERYTHROCYTE [DISTWIDTH] IN BLOOD BY AUTOMATED COUNT: 20.3 % (ref 12.3–15.4)
GLOBULIN UR ELPH-MCNC: 2.9 GM/DL
GLUCOSE SERPL-MCNC: 110 MG/DL (ref 65–99)
HCT VFR BLD AUTO: 40.7 % (ref 37.5–51)
HGB BLD-MCNC: 13.4 G/DL (ref 13–17.7)
HOLD SPECIMEN: NORMAL
IMM GRANULOCYTES # BLD AUTO: 0.02 10*3/MM3 (ref 0–0.05)
IMM GRANULOCYTES NFR BLD AUTO: 0.3 % (ref 0–0.5)
LYMPHOCYTES # BLD AUTO: 1.24 10*3/MM3 (ref 0.7–3.1)
LYMPHOCYTES NFR BLD AUTO: 19.9 % (ref 19.6–45.3)
MCH RBC QN AUTO: 28.3 PG (ref 26.6–33)
MCHC RBC AUTO-ENTMCNC: 32.9 G/DL (ref 31.5–35.7)
MCV RBC AUTO: 85.9 FL (ref 79–97)
MONOCYTES # BLD AUTO: 0.68 10*3/MM3 (ref 0.1–0.9)
MONOCYTES NFR BLD AUTO: 10.9 % (ref 5–12)
NEUTROPHILS NFR BLD AUTO: 4.08 10*3/MM3 (ref 1.7–7)
NEUTROPHILS NFR BLD AUTO: 65.7 % (ref 42.7–76)
NRBC BLD AUTO-RTO: 0 /100 WBC (ref 0–0.2)
PLATELET # BLD AUTO: 305 10*3/MM3 (ref 140–450)
PMV BLD AUTO: 10 FL (ref 6–12)
POTASSIUM SERPL-SCNC: 3.7 MMOL/L (ref 3.5–5.2)
PROT SERPL-MCNC: 6.9 G/DL (ref 6–8.5)
RBC # BLD AUTO: 4.74 10*6/MM3 (ref 4.14–5.8)
SODIUM SERPL-SCNC: 134 MMOL/L (ref 136–145)
WBC NRBC COR # BLD: 6.22 10*3/MM3 (ref 3.4–10.8)
WHOLE BLOOD HOLD COAG: NORMAL

## 2023-03-06 PROCEDURE — 96375 TX/PRO/DX INJ NEW DRUG ADDON: CPT

## 2023-03-06 PROCEDURE — 25010000002 ONDANSETRON PER 1 MG: Performed by: EMERGENCY MEDICINE

## 2023-03-06 PROCEDURE — 85025 COMPLETE CBC W/AUTO DIFF WBC: CPT | Performed by: EMERGENCY MEDICINE

## 2023-03-06 PROCEDURE — 87040 BLOOD CULTURE FOR BACTERIA: CPT | Performed by: EMERGENCY MEDICINE

## 2023-03-06 PROCEDURE — 80053 COMPREHEN METABOLIC PANEL: CPT

## 2023-03-06 PROCEDURE — 99283 EMERGENCY DEPT VISIT LOW MDM: CPT

## 2023-03-06 PROCEDURE — 96374 THER/PROPH/DIAG INJ IV PUSH: CPT

## 2023-03-06 PROCEDURE — 25010000002 HYDROMORPHONE 1 MG/ML SOLUTION: Performed by: EMERGENCY MEDICINE

## 2023-03-06 PROCEDURE — 93971 EXTREMITY STUDY: CPT

## 2023-03-06 PROCEDURE — 93971 EXTREMITY STUDY: CPT | Performed by: SURGERY

## 2023-03-06 PROCEDURE — 36415 COLL VENOUS BLD VENIPUNCTURE: CPT

## 2023-03-06 RX ORDER — SODIUM CHLORIDE 0.9 % (FLUSH) 0.9 %
10 SYRINGE (ML) INJECTION AS NEEDED
Status: DISCONTINUED | OUTPATIENT
Start: 2023-03-06 | End: 2023-03-07 | Stop reason: HOSPADM

## 2023-03-06 RX ORDER — ASPIRIN 325 MG
325 TABLET ORAL DAILY
COMMUNITY

## 2023-03-06 RX ORDER — ONDANSETRON 2 MG/ML
4 INJECTION INTRAMUSCULAR; INTRAVENOUS ONCE
Status: COMPLETED | OUTPATIENT
Start: 2023-03-06 | End: 2023-03-06

## 2023-03-06 RX ADMIN — ONDANSETRON 4 MG: 2 INJECTION INTRAMUSCULAR; INTRAVENOUS at 23:51

## 2023-03-06 RX ADMIN — HYDROMORPHONE HYDROCHLORIDE 0.5 MG: 1 INJECTION, SOLUTION INTRAMUSCULAR; INTRAVENOUS; SUBCUTANEOUS at 23:54

## 2023-03-07 ENCOUNTER — TELEPHONE (OUTPATIENT)
Dept: ORTHOPEDIC SURGERY | Facility: CLINIC | Age: 73
End: 2023-03-07
Payer: COMMERCIAL

## 2023-03-07 LAB
BH CV LOWER VASCULAR LEFT COMMON FEMORAL AUGMENT: NORMAL
BH CV LOWER VASCULAR LEFT COMMON FEMORAL COMPETENT: NORMAL
BH CV LOWER VASCULAR LEFT COMMON FEMORAL COMPRESS: NORMAL
BH CV LOWER VASCULAR LEFT COMMON FEMORAL PHASIC: NORMAL
BH CV LOWER VASCULAR LEFT COMMON FEMORAL SPONT: NORMAL
BH CV LOWER VASCULAR RIGHT COMMON FEMORAL AUGMENT: NORMAL
BH CV LOWER VASCULAR RIGHT COMMON FEMORAL COMPETENT: NORMAL
BH CV LOWER VASCULAR RIGHT COMMON FEMORAL COMPRESS: NORMAL
BH CV LOWER VASCULAR RIGHT COMMON FEMORAL PHASIC: NORMAL
BH CV LOWER VASCULAR RIGHT COMMON FEMORAL SPONT: NORMAL
BH CV LOWER VASCULAR RIGHT DISTAL FEMORAL COMPRESS: NORMAL
BH CV LOWER VASCULAR RIGHT GASTRONEMIUS COMPRESS: NORMAL
BH CV LOWER VASCULAR RIGHT GREATER SAPH AK COMPRESS: NORMAL
BH CV LOWER VASCULAR RIGHT GREATER SAPH BK COMPRESS: NORMAL
BH CV LOWER VASCULAR RIGHT LESSER SAPH COMPRESS: NORMAL
BH CV LOWER VASCULAR RIGHT MID FEMORAL AUGMENT: NORMAL
BH CV LOWER VASCULAR RIGHT MID FEMORAL COMPETENT: NORMAL
BH CV LOWER VASCULAR RIGHT MID FEMORAL COMPRESS: NORMAL
BH CV LOWER VASCULAR RIGHT MID FEMORAL PHASIC: NORMAL
BH CV LOWER VASCULAR RIGHT MID FEMORAL SPONT: NORMAL
BH CV LOWER VASCULAR RIGHT PERONEAL COMPRESS: NORMAL
BH CV LOWER VASCULAR RIGHT POPLITEAL AUGMENT: NORMAL
BH CV LOWER VASCULAR RIGHT POPLITEAL COMPETENT: NORMAL
BH CV LOWER VASCULAR RIGHT POPLITEAL COMPRESS: NORMAL
BH CV LOWER VASCULAR RIGHT POPLITEAL PHASIC: NORMAL
BH CV LOWER VASCULAR RIGHT POPLITEAL SPONT: NORMAL
BH CV LOWER VASCULAR RIGHT POSTERIOR TIBIAL COMPRESS: NORMAL
BH CV LOWER VASCULAR RIGHT PROXIMAL FEMORAL COMPRESS: NORMAL
BH CV VAS PRELIMINARY FINDINGS SCRIPTING: 1
MAXIMAL PREDICTED HEART RATE: 145 BPM
STRESS TARGET HR: 123 BPM

## 2023-03-07 NOTE — TELEPHONE ENCOUNTER
Caller: LAILA   Relationship to Patient: DAUGHTER   Phone Number: [phone]  Reason for Call: PATIENTS DAUGHTER CALLING ASKING IF THEY NEED TO FOLLOW UP SOONER AFTER TAKING THE PATIENT TO THE ED LAST NIGHT BECAUSE THEY THOUGHT HE HAD A BLOOD CLOT BUT HE DOES NOT

## 2023-03-07 NOTE — ED PROVIDER NOTES
Time: 7:48 PM EST  Date of encounter:  3/6/2023  Independent Historian/Clinical History and Information was obtained by:   Patient and Family  Chief Complaint   Patient presents with   • Knee Pain     Had TKR 2/13. 2 days ago R knee became hot and swollen. Given keflex by ortho, finished eliquis, cont ASA       History is limited by: N/A    History of Present Illness:  Patient is a 73 y.o. year old male who presents to the emergency department for evaluation of right knee swelling, tenderness, redness, and warmth.  Swelling and redness radiate down entire right lower extremity below the knee.  Patient is recently had a knee replacement.  There is also palpable nodule/mass behind the right knee. (Yesenia, [facility] - PIT Provider).        Pt had a Right total knee replacement surgery on 2/13/23. He saw Dr. Vigil a week ago, who prescribed 6 days of Keflex due to potential infection. Pt family says the Right knee is warm, red, and swollen.  Pt family says the severity of the swelling and redness was worse prior to going to the hospital.           Patient Care Team  Primary Care Provider: Agustin Balbuena MD    Past Medical History:     No Known Allergies  Past Medical History:   Diagnosis Date   • Anemia    • Arthritis    • COPD (chronic obstructive pulmonary disease) (HCC)    • Coronary artery disease involving native heart without angina pectoris 11/24/2021    Follows with Dr. Winters   • ETD (Eustachian tube dysfunction), bilateral    • GERD (gastroesophageal reflux disease)    • Gout     no issues for several years   • H/O arthroscopy of shoulder 09/28/2017    aftercare following, right    • Hearing loss    • History of arthroscopy of right shoulder 01/11/2018   • Hyperlipemia    • Hypertension, essential    • Hypothyroidism    • Limb swelling     feet ankles   • Lipoma     removed   • Low back pain    • Melanoma (HCC)     left arm removed   • Nasal septal ulcer    • Otalgia    • Personal history of colonic polyps  07/14/2014   • Recurrent epistaxis     no current issues   • Rotator cuff tear arthropathy of right shoulder 08/01/2017    repaired   • S/P TKR (total knee replacement), right     2/13/2023   • Seasonal allergies    • Sleep apnea     does not use CPAP   • Tinnitus, bilateral      Past Surgical History:   Procedure Laterality Date   • CHOLECYSTECTOMY     • COLONOSCOPY  12/10/2019    DR. LANE   • COLONOSCOPY N/A 02/06/2023    Procedure: COLONOSCOPY WITH COLD SNARE POLYPECTOMY;  Surgeon: Mich Lane MD;  Location: [facility] ENDOSCOPY;  Service: Gastroenterology;  Laterality: N/A;  COLON POLYP. DIVERTICULOSIS   • ENDOSCOPY     • ENDOSCOPY N/A 09/20/2021    Procedure: ESOPHAGOGASTRODUODENOSCOPY with biopsy;  Surgeon: Mcih Lane MD;  Location: [facility] ENDOSCOPY;  Service: Gastroenterology;  Laterality: N/A;  gastric polyps   • LIPOMA EXCISION  02/26/2019    removal  neck   • MYRINGOTOMY W/ TUBES      states had had several pairs placed by Dr. Kaba    • OTHER SURGICAL HISTORY Left 2013    melanoma excision from shoulder    • ROTATOR CUFF REPAIR Right 2017   • TONSILLECTOMY     • TOTAL KNEE ARTHROPLASTY Right 2/13/2023    Procedure: RIGHT TOTAL KNEE ARTHROPLASTY.;  Surgeon: Bao Vigil MD;  Location: [facility] MAIN OR;  Service: Orthopedics;  Laterality: Right;   • UPPER GASTROINTESTINAL ENDOSCOPY       Family History   Problem Relation Age of Onset   • Heart disease Father    • Heart attack Father 57        MI   • Diabetes Brother    • Colon cancer Other    • Malig Hyperthermia Neg Hx        Home Medications:  Prior to Admission medications    Medication Sig Start Date End Date Taking? Authorizing Provider   allopurinol (ZYLOPRIM) 100 MG tablet Take 1 tablet by mouth Every Night.    Provider, MD Prakash   apixaban (ELIQUIS) 2.5 MG tablet tablet Take 1 tablet by mouth Every 12 (Twelve) Hours. Once Eliquis is completed he may then restart his preoperative baby aspirin daily 2/14/23   Musa  Bao LAWS MD   ascorbic acid (VITAMIN C) 500 MG capsule controlled-release CR capsule Take 1 capsule by mouth Daily.    Prakash Romero MD   atorvastatin (LIPITOR) 40 MG tablet Take 1 tablet by mouth Daily.  Patient taking differently: Take 1 tablet by mouth Every Night. 22   Jackie Ochoa MD   cephalexin (KEFLEX) 500 MG capsule Take 1 capsule by mouth 3 (Three) Times a Day. 3/2/23   Bao Vigil MD   FeroSul 325 (65 Fe) MG tablet Take 1 tablet by mouth Every Other Day. 12/15/22   Prakash Romero MD   fexofenadine (ALLEGRA) 180 MG tablet Take 1 tablet by mouth Daily.    Prakash Romero MD   fluticasone (FLONASE) 50 MCG/ACT nasal spray 1 spray into the nostril(s) as directed by provider Every Night.    Prakash Romero MD   HYDROcodone-acetaminophen (NORCO) 7.5-325 MG per tablet TAKE 1 TABLET BY MOUTH EVERY 4 HOURS AS NEEDED for moderate pain 3/1/23   Bao Vigil MD   levothyroxine (SYNTHROID, LEVOTHROID) 137 MCG tablet Take 1 tablet by mouth Daily.    Prakash Romero MD   losartan (COZAAR) 50 MG tablet Take 1 tablet by mouth 2 (Two) Times a Day. 22   Jackie Ochoa MD   montelukast (SINGULAIR) 10 MG tablet Take 1 tablet by mouth Every Night.    Prakash Romero MD   pantoprazole (PROTONIX) 40 MG EC tablet Take 1 tablet by mouth Daily.    Prakash Romero MD   spironolactone-hydrochlorothiazide (ALDACTAZIDE) 25-25 MG tablet Take 1 tablet by mouth Every Other Day. 22   Jackie Ochoa MD   traZODone (DESYREL) 50 MG tablet Take 1 tablet by mouth Every Night.    Prakash Romero MD   Zinc 50 MG capsule Take 1 capsule by mouth Daily.    Prakash Romero MD        Social History:   Social History     Tobacco Use   • Smoking status: Former     Packs/day: 0.50     Years: 12.00     Pack years: 6.00     Types: Cigarettes     Quit date:      Years since quittin.2   • Smokeless tobacco: Former     Types: Chew     Quit date:    • Tobacco  "comments:     QUIT 1975   Vaping Use   • Vaping Use: Never used   Substance Use Topics   • Alcohol use: Yes     Alcohol/week: 3.0 standard drinks     Types: 2 Cans of beer, 1 Shots of liquor per week     Comment: occ   • Drug use: Never         Review of Systems:  Review of Systems   Constitutional: Negative for chills and fever.   HENT: Negative for sore throat.    Eyes: Negative for photophobia.   Respiratory: Negative for shortness of breath.    Cardiovascular: Positive for leg swelling. Negative for chest pain.   Gastrointestinal: Negative for abdominal pain, diarrhea, nausea and vomiting.   Genitourinary: Negative for dysuria.   Musculoskeletal: Negative for neck pain.   Skin: Positive for color change. Negative for wound.   Neurological: Negative for headaches.   All other systems reviewed and are negative.       Physical Exam:  /72   Pulse 57   Temp 97.7 °F (36.5 °C)   Resp 20   Ht 175.3 cm (69\")   Wt 107 kg (236 lb 5.3 oz)   SpO2 95%   BMI 34.90 kg/m²     Physical Exam  Vitals and nursing note reviewed.   Constitutional:       General: He is not in acute distress.  HENT:      Head: Normocephalic and atraumatic.   Eyes:      Extraocular Movements: Extraocular movements intact.   Cardiovascular:      Rate and Rhythm: Normal rate and regular rhythm.   Pulmonary:      Effort: Pulmonary effort is normal. No respiratory distress.      Breath sounds: Normal breath sounds.   Abdominal:      General: Abdomen is flat.      Palpations: Abdomen is soft.      Tenderness: There is no abdominal tenderness.   Musculoskeletal:         General: Normal range of motion.      Cervical back: Normal range of motion and neck supple.      Right knee: No swelling or bony tenderness. No tenderness.      Comments: Surgical incision is clean, dry, and intact. Steri-strips are present. There is no significant swelling. There is some erythema on the thigh and lower leg.    Skin:     General: Skin is warm and dry.      " Capillary Refill: Capillary refill takes less than 2 seconds.      Findings: Erythema present.   Neurological:      Mental Status: He is alert and oriented to person, place, and time. Mental status is at baseline.                  Procedures:  Procedures      Medical Decision Making:      Comorbidities that affect care:    COPD, Coronary Artery Disease, Hypertension    External Notes reviewed:    None      The following orders were placed and all results were independently analyzed by me:  Orders Placed This Encounter   Procedures   • Blood Culture - Blood,   • Blood Culture - Blood,   • Comprehensive Metabolic Panel   • CBC Auto Differential   • CBC & Differential   • Extra Tubes   • Gold Top - SST   • Light Blue Top       Medications Given in the Emergency Department:  Medications   HYDROmorphone (DILAUDID) injection 0.5 mg (0.5 mg Intravenous Given 3/6/23 2354)   ondansetron (ZOFRAN) injection 4 mg (4 mg Intravenous Given 3/6/23 2351)        ED Course:    The patient was initially evaluated in the triage area where orders were placed. The patient was later dispositioned by Earnest Leos DO.      The patient was advised to stay for completion of workup which includes but is not limited to communication of labs and radiological results, reassessment and plan. The patient was advised that leaving prior to disposition by a provider could result in critical findings that are not communicated to the patient.     ED Course as of 03/11/23 1440   Mon Mar 06, 2023   1950   --- PROVIDER IN TRIAGE NOTE ---    Patient was seen and evaluated in triage by PREMA Jenkins.  Orders were written and the patient is currently awaiting disposition.   [MS]      ED Course User Index  [MS] Suze Faria APRN       Labs:    Lab Results (last 24 hours)     ** No results found for the last 24 hours. **           Imaging:    No Radiology Exams Resulted Within Past 24 Hours      Differential Diagnosis and  Discussion:      Extremity Pain: Differential diagnosis includes but is not limited to soft tissue sprain, tendonitis, tendon injury, dislocation, fracture, deep vein thrombosis, arterial insufficiency, osteoarthritis, bursitis, and ligamentous damage.    All labs were reviewed and interpreted by me.  Ultrasound interpretation was reviewed by me.     MDM         Patient Care Considerations:          Consultants/Shared Management Plan:    Consultant: I have discussed the case with Dr. Hogan who states Patient can be discharged with office follow-up    Social Determinants of Health:    Patient has presented with family members who are responsible, reliable and will ensure follow up care.      Disposition and Care Coordination:    Discharged: The patient is suitable and stable for discharge with no need for consideration of observation or admission.        Final diagnoses:   Status post knee surgery        ED Disposition     ED Disposition   Discharge    Condition   Stable    Comment   --             This medical record created using voice recognition software.      Documentation assistance provided by Shin Phillips acting as scribe for Earnest Leos DO. Information recorded by the scribe was done at my direction and has been verified and validated by me.            Shin Phillips  03/07/23 0000       Earnest Leos DO  03/11/23 1442

## 2023-03-07 NOTE — TELEPHONE ENCOUNTER
RECEIVED A CALL FROM CLARIBEL, DAUGHTER OF PATIENT LAST NIGHT (3/6/23)  AT 610PM WITH PICTURES OF SURGICAL LEG.  FORWARDED TO DR HAN.  PATIENT'S DAUGHTER CONCERNED ABOUT BLOOD CLOT.  DR HAN ADVISED ME TO ORDER A DOPSCAN FOR THE MORNING BUT, DAUGHTER TOOK PATIENT TO ER.  DOPSCAN WAS NEGATIVE AND CBC WITHIN NORMAL LIMITS.  DR SHAH INFORMED ON 3/7/23

## 2023-03-12 LAB
BACTERIA SPEC AEROBE CULT: NORMAL
BACTERIA SPEC AEROBE CULT: NORMAL

## 2023-03-14 ENCOUNTER — OFFICE VISIT (OUTPATIENT)
Dept: ORTHOPEDIC SURGERY | Facility: CLINIC | Age: 73
End: 2023-03-14
Payer: MEDICARE

## 2023-03-14 VITALS — BODY MASS INDEX: 34.96 KG/M2 | HEIGHT: 69 IN | WEIGHT: 236 LBS

## 2023-03-14 DIAGNOSIS — M25.561 RIGHT KNEE PAIN, UNSPECIFIED CHRONICITY: Primary | ICD-10-CM

## 2023-03-14 DIAGNOSIS — Z96.651 AFTERCARE FOLLOWING RIGHT KNEE JOINT REPLACEMENT SURGERY: ICD-10-CM

## 2023-03-14 DIAGNOSIS — Z47.1 AFTERCARE FOLLOWING RIGHT KNEE JOINT REPLACEMENT SURGERY: ICD-10-CM

## 2023-03-14 PROCEDURE — 99024 POSTOP FOLLOW-UP VISIT: CPT | Performed by: ORTHOPAEDIC SURGERY

## 2023-03-14 RX ORDER — HYDROCODONE BITARTRATE AND ACETAMINOPHEN 7.5; 325 MG/1; MG/1
1 TABLET ORAL EVERY 6 HOURS PRN
Qty: 30 TABLET | Refills: 0 | Status: SHIPPED | OUTPATIENT
Start: 2023-03-14

## 2023-03-14 RX ORDER — SULFAMETHOXAZOLE AND TRIMETHOPRIM 800; 160 MG/1; MG/1
1 TABLET ORAL 2 TIMES DAILY
Qty: 10 TABLET | Refills: 0 | Status: SHIPPED | OUTPATIENT
Start: 2023-03-14

## 2023-03-15 NOTE — PROGRESS NOTES
"Chief Complaint  Pain and Follow-up of the Right Knee     Subjective      Percy Velarde presents to [facility] for follow-up evaluation of right knee. He had a right total knee arthroplasty on 23.   He recently went for a ultrasound which revealed no blood clot. He is overall doing well. His wound is healing well. He has a couple of areas where steri-strips may have stayed on a bit to long.     No Known Allergies     Social History     Socioeconomic History   • Marital status:    Tobacco Use   • Smoking status: Former     Packs/day: 0.50     Years: 12.00     Pack years: 6.00     Types: Cigarettes     Quit date:      Years since quittin.2   • Smokeless tobacco: Former     Types: Chew     Quit date:    • Tobacco comments:     QUIT    Vaping Use   • Vaping Use: Never used   Substance and Sexual Activity   • Alcohol use: Yes     Alcohol/week: 3.0 standard drinks     Types: 2 Cans of beer, 1 Shots of liquor per week     Comment: occ   • Drug use: Never   • Sexual activity: Defer        Review of Systems     Objective   Vital Signs:   Ht 175.3 cm (69\")   Wt 107 kg (236 lb)   BMI 34.85 kg/m²       Physical Exam  Constitutional:       Appearance: Normal appearance. Patient is well-developed and normal weight.   HENT:      Head: Normocephalic.      Right Ear: Hearing and external ear normal.      Left Ear: Hearing and external ear normal.      Nose: Nose normal.   Eyes:      Conjunctiva/sclera: Conjunctivae normal.   Cardiovascular:      Rate and Rhythm: Normal rate.   Pulmonary:      Effort: Pulmonary effort is normal.      Breath sounds: No wheezing or rales.   Abdominal:      Palpations: Abdomen is soft.      Tenderness: There is no abdominal tenderness.   Musculoskeletal:      Cervical back: Normal range of motion.   Skin:     Findings: No rash.   Neurological:      Mental Status: Patient is alert and oriented to person, place, and time.   Psychiatric:         " Mood and Affect: Mood and affect normal.         Judgment: Judgment normal.       Ortho Exam      ROM 5 to 110 degrees. Strength is improving. Using a cane for ambulation.      Procedures      Imaging Results (Most Recent)     None           Result Review :         XR Knee 1 or 2 View Right    Result Date: 2/13/2023  Narrative: PROCEDURE: XR KNEE 1 OR 2 VW RIGHT  COMPARISON: [facility], XR KNEE 3 VW RIGHT, 1/03/2023, 8:46.  INDICATIONS: Post-Op Knee Arthoplasty  FINDINGS:  There has been a cemented right total knee arthroplasty.  There is extensive soft tissue gas and soft tissue edema related to the recent procedure.  No periprosthetic fracture.  There is skin staples.      Impression:  Status post total knee arthroplasty.  Expected, immediate postoperative appearance.      KP COY MD       Electronically Signed and Approved By: KP COY MD on 2/13/2023 at 14:43             XR Knee 3 View Right    Result Date: 3/2/2023  Narrative: X-Ray Report: Right knee X-Ray Indication: Evaluation of the right knee. AP/Lateral and [place] view(s) Findings: Intact right knee replacement.  No signs of loosening, subsidence or periprosthetic fracture. Prior studies available for comparison:Yes    Duplex Venous Lower Extremity - Right CAR    Result Date: 3/7/2023  Narrative: •  Normal right lower extremity venous duplex scan.              Assessment and Plan     Diagnoses and all orders for this visit:    1. Right knee pain, unspecified chronicity (Primary)  -     HYDROcodone-acetaminophen (NORCO) 7.5-325 MG per tablet; Take 1 tablet by mouth Every 6 (Six) Hours As Needed for Moderate Pain.  Dispense: 30 tablet; Refill: 0  -     sulfamethoxazole-trimethoprim (Bactrim DS) 800-160 MG per tablet; Take 1 tablet by mouth 2 (Two) Times a Day.  Dispense: 10 tablet; Refill: 0    2. Aftercare following right knee joint replacement surgery  -     HYDROcodone-acetaminophen (NORCO) 7.5-325 MG per tablet; Take 1 tablet by  mouth Every 6 (Six) Hours As Needed for Moderate Pain.  Dispense: 30 tablet; Refill: 0  -     sulfamethoxazole-trimethoprim (Bactrim DS) 800-160 MG per tablet; Take 1 tablet by mouth 2 (Two) Times a Day.  Dispense: 10 tablet; Refill: 0            Call or return if worsening symptoms.    Follow Up     Patient will be placed on Bactrim for about 5 days. He was given a refill of pain medication. We will see him back in a few weeks to recheck with wound and ROM.       Patient was given instructions and counseling regarding his condition or for health maintenance advice. Please see specific information pulled into the AVS if appropriate.     Transcribed for Bao Vigil MD by Rabia Adorno.  03/15/23   13:42 EDT    I have personally performed the services described in this document as scribed by the above individual and it is both accurate and complete. Bao Vigil MD 03/16/23

## 2023-03-22 ENCOUNTER — TELEPHONE (OUTPATIENT)
Dept: ORTHOPEDIC SURGERY | Facility: CLINIC | Age: 73
End: 2023-03-22
Payer: COMMERCIAL

## 2023-03-22 NOTE — TELEPHONE ENCOUNTER
CALLED AND TALKED TO PATIENT --APPEARS TO BE NORMAL HEALING --PATIENT VOICES UNDERSTANDING TO CALL WITH ANY NEW ISSUES OR CONCERNS.  PATIENT TO KEEP SCHEDULED APPOINTMENT ON 3-31-23

## 2023-03-22 NOTE — TELEPHONE ENCOUNTER
Caller: YOU    Relationship: SELF    Best call back number:   [phone]    What is the best time to reach you: ANY      What was the call regarding: PATIENT IS CALLING STATING THE INCISION ON HIS RIGHT LEG WHERE HE HAD SURGERY 2.12.23 IS RED/SLIGHTLY SWOLLEN AND WARM TO TOUCH.     Do you require a callback: YES

## 2023-03-30 ENCOUNTER — TRANSCRIBE ORDERS (OUTPATIENT)
Dept: ADMINISTRATIVE | Facility: HOSPITAL | Age: 73
End: 2023-03-30
Payer: COMMERCIAL

## 2023-03-30 DIAGNOSIS — R31.0 GROSS HEMATURIA: Primary | ICD-10-CM

## 2023-03-31 ENCOUNTER — OFFICE VISIT (OUTPATIENT)
Dept: ORTHOPEDIC SURGERY | Facility: CLINIC | Age: 73
End: 2023-03-31
Payer: MEDICARE

## 2023-03-31 VITALS — WEIGHT: 236 LBS | BODY MASS INDEX: 34.96 KG/M2 | HEIGHT: 69 IN

## 2023-03-31 DIAGNOSIS — Z96.651 AFTERCARE FOLLOWING RIGHT KNEE JOINT REPLACEMENT SURGERY: Primary | ICD-10-CM

## 2023-03-31 DIAGNOSIS — Z47.1 AFTERCARE FOLLOWING RIGHT KNEE JOINT REPLACEMENT SURGERY: Primary | ICD-10-CM

## 2023-03-31 PROCEDURE — 99024 POSTOP FOLLOW-UP VISIT: CPT | Performed by: ORTHOPAEDIC SURGERY

## 2023-03-31 RX ORDER — AMLODIPINE BESYLATE 2.5 MG/1
TABLET ORAL
COMMUNITY
Start: 2023-03-30

## 2023-03-31 NOTE — PROGRESS NOTES
"Chief Complaint  Follow-up of the Right Knee     Subjective      Percy Velarde presents to [facility] for follow up of the right knee. He had a right total knee arthroplasty on 23.   His wound is doing well.  He is in physical therapy and is having increase in ROM.  He has no complaints except some heat in the knee and up the leg.     No Known Allergies     Social History     Socioeconomic History   • Marital status:    Tobacco Use   • Smoking status: Former     Packs/day: 0.50     Years: 12.00     Pack years: 6.00     Types: Cigarettes     Quit date:      Years since quittin.2   • Smokeless tobacco: Former     Types: Chew     Quit date:    • Tobacco comments:     QUIT    Vaping Use   • Vaping Use: Never used   Substance and Sexual Activity   • Alcohol use: Yes     Alcohol/week: 3.0 standard drinks     Types: 2 Cans of beer, 1 Shots of liquor per week     Comment: occ   • Drug use: Never   • Sexual activity: Defer        Review of Systems     Objective   Vital Signs:   Ht 175.3 cm (69\")   Wt 107 kg (236 lb)   BMI 34.85 kg/m²       Physical Exam  Constitutional:       Appearance: Normal appearance. Patient is well-developed and normal weight.   HENT:      Head: Normocephalic.      Right Ear: Hearing and external ear normal.      Left Ear: Hearing and external ear normal.      Nose: Nose normal.   Eyes:      Conjunctiva/sclera: Conjunctivae normal.   Cardiovascular:      Rate and Rhythm: Normal rate.   Pulmonary:      Effort: Pulmonary effort is normal.      Breath sounds: No wheezing or rales.   Abdominal:      Palpations: Abdomen is soft.      Tenderness: There is no abdominal tenderness.   Musculoskeletal:      Cervical back: Normal range of motion.   Skin:     Findings: No rash.   Neurological:      Mental Status: Patient is alert and oriented to person, place, and time.   Psychiatric:         Mood and Affect: Mood and affect normal.         Judgment: " Judgment normal.       Ortho Exam      RIGHT KNEE Flexion 117. Extension -3. Stable to varus/valgus stress. Stable to anterior/posterior drawer. Neurovascularly intact. Negative Moon. Negative Lachman. Positive EHL, FHL, HS and TA. Sensation intact to light touch all 5 nerves of the foot. Ambulates with Antalgic gait. Patella is well tracking. Calf supple, non-tender. Negative tenderness to the medial joint line. Negative tenderness to the lateral joint line. Negative Crepitus. Good strength to hamstrings, quadriceps, dorsiflexors, and plantar flexors.  Knee Extensor Mechanism intact        Procedures      Imaging Results (Most Recent)     None           Result Review :                    Assessment and Plan     Diagnoses and all orders for this visit:    1. Aftercare following right knee joint replacement surgery (Primary)        Discussed the treatment plan with the patient. Continue physical therapy.     Call or return if worsening symptoms.    Follow Up     3 months       Patient was given instructions and counseling regarding his condition or for health maintenance advice. Please see specific information pulled into the AVS if appropriate.     Scribed for Bao Vigil MD by Dasia Ribeiro MA.  03/31/23   08:00 EDT    I have personally performed the services described in this document as scribed by the above individual and it is both accurate and complete. Bao Vigil MD 04/04/23

## 2023-04-06 ENCOUNTER — HOSPITAL ENCOUNTER (OUTPATIENT)
Dept: CT IMAGING | Facility: HOSPITAL | Age: 73
Discharge: HOME | End: 2023-04-06
Admitting: FAMILY MEDICINE
Payer: MEDICARE

## 2023-04-06 DIAGNOSIS — R31.0 GROSS HEMATURIA: ICD-10-CM

## 2023-04-06 PROCEDURE — 74176 CT ABD & PELVIS W/O CONTRAST: CPT

## 2023-04-25 NOTE — PROGRESS NOTES
Chief Complaint: Blood in Urine    Subjective         History of Present Illness  Percy Velarde is a 73 y.o. male presents to [facility] UROLOGY to be seen for gross hematuria.    Seen by his PCP on 3/30/2023 with complaints of 1 episode of gross hematuria.  He is here for evaluation. He had an episode after having knee replacement in February, then a week later had another episode. He reports the blood was at the end of his urine stream and was a small amount. He was just started on doxazosin last week for his B/P    Frequency- admits    Urgency- admits    Incontinence- occasional    Nocturia- 1    Stream- not very good    GH- 2 episodes      surgeries- denies    Family history of  malignancy- denies    Cardiopulmonary: COPD, HTN    Anticoagulants: ASA 81mg QOD    Smoker-quit 1975    PSA   3/31/2023 0.6    PROCEDURE:  CT ABDOMEN PELVIS WO CONTRAST     COMPARISON: [facility], CT, CHEST W/O CONTRAST, 2/12/2015, 12:18.     INDICATIONS:  GROSS HEMATURIA AFTER KNEE REPLACEMENT IN FEB 2023, RESOLVED IN MARCH 2023, C/O LBP     TECHNIQUE:    CT images were created without intravenous contrast.       PROTOCOL:     Standard imaging protocol performed                 RADIATION:      DLP: 1295mGy*cm               Automated exposure control was utilized to minimize radiation dose.      FINDINGS:          Segmental sized areas of atelectasis in the left lower lobe are not significantly changed.    Extensive coronary artery calcifications are evident.     The liver is of normal size.  The gallbladder is absent, surgical clips are seen in the gallbladder   bed.  No pancreatic or adrenal mass is seen.  The spleen is of normal size.     No renal or ureteral stones are seen.  There is no evidence of hydronephrosis.  The prostate gland   measures 5.2 cm in greatest transverse dimension.     Bowel loops are not abnormally dilated.  The appendix is normal.  Moderate diverticulosis of the    descending colon and sigmoid colon is evident.     Small umbilical hernias containing fat are evident.     Degenerative changes are seen in the lower thoracic and lumbar spine.     Evaluation of abdominal organs and bowel is limited without contrast.     CONTINUED ON NEXT PAGE...           IMPRESSION:                 CT scan of the abdomen and pelvis without contrast demonstrating airspace disease in the left lower   lobe, not significantly changed.     Post cholecystectomy.     No renal or ureteral stones are seen.  The cause of the patient's hematuria is not evident.     Moderate diverticulosis of the descending colon and sigmoid colon without diverticulitis.            ROBERTA JORDAN MD         Electronically Signed and Approved By: ROBERTA JORDAN MD on 4/06/2023 at 16:16         Objective     Past Medical History:   Diagnosis Date   • Anemia    • Arthritis    • COPD (chronic obstructive pulmonary disease)    • Coronary artery disease involving native heart without angina pectoris 11/24/2021    Follows with Dr. Winters   • ETD (Eustachian tube dysfunction), bilateral    • GERD (gastroesophageal reflux disease)    • Gout     no issues for several years   • H/O arthroscopy of shoulder 09/28/2017    aftercare following, right    • Hearing loss    • History of arthroscopy of right shoulder 01/11/2018   • Hyperlipemia    • Hypertension, essential    • Hypothyroidism    • Limb swelling     feet ankles   • Lipoma     removed   • Low back pain    • Melanoma     left arm removed   • Nasal septal ulcer    • Otalgia    • Personal history of colonic polyps 07/14/2014   • Recurrent epistaxis     no current issues   • Rotator cuff tear arthropathy of right shoulder 08/01/2017    repaired   • S/P TKR (total knee replacement), right     2/13/2023   • Seasonal allergies    • Sleep apnea     does not use CPAP   • Tinnitus, bilateral        Past Surgical History:   Procedure Laterality Date   • CHOLECYSTECTOMY     • COLONOSCOPY   12/10/2019    DR. LANE   • COLONOSCOPY N/A 02/06/2023    Procedure: COLONOSCOPY WITH COLD SNARE POLYPECTOMY;  Surgeon: Mich Lane MD;  Location: [facility] ENDOSCOPY;  Service: Gastroenterology;  Laterality: N/A;  COLON POLYP. DIVERTICULOSIS   • ENDOSCOPY     • ENDOSCOPY N/A 09/20/2021    Procedure: ESOPHAGOGASTRODUODENOSCOPY with biopsy;  Surgeon: Mich Lane MD;  Location: [facility] ENDOSCOPY;  Service: Gastroenterology;  Laterality: N/A;  gastric polyps   • LIPOMA EXCISION  02/26/2019    removal  neck   • MYRINGOTOMY W/ TUBES      states had had several pairs placed by Dr. Kaba    • OTHER SURGICAL HISTORY Left 2013    melanoma excision from shoulder    • ROTATOR CUFF REPAIR Right 2017   • TONSILLECTOMY     • TOTAL KNEE ARTHROPLASTY Right 2/13/2023    Procedure: RIGHT TOTAL KNEE ARTHROPLASTY.;  Surgeon: Bao Vigil MD;  Location: [facility] MAIN OR;  Service: Orthopedics;  Laterality: Right;   • UPPER GASTROINTESTINAL ENDOSCOPY           Current Outpatient Medications:   •  allopurinol (ZYLOPRIM) 100 MG tablet, Take 1 tablet by mouth Every Night., Disp: , Rfl:   •  amLODIPine (NORVASC) 2.5 MG tablet, , Disp: , Rfl:   •  ascorbic acid (VITAMIN C) 500 MG capsule controlled-release CR capsule, Take 1 capsule by mouth Daily., Disp: , Rfl:   •  atorvastatin (LIPITOR) 40 MG tablet, Take 1 tablet by mouth Daily. (Patient taking differently: Take 1 tablet by mouth Every Night.), Disp: 90 tablet, Rfl: 3  •  fexofenadine (ALLEGRA) 180 MG tablet, Take 1 tablet by mouth Daily., Disp: , Rfl:   •  fluticasone (FLONASE) 50 MCG/ACT nasal spray, 1 spray into the nostril(s) as directed by provider Every Night., Disp: , Rfl:   •  levothyroxine (SYNTHROID, LEVOTHROID) 137 MCG tablet, Take 1 tablet by mouth Daily., Disp: , Rfl:   •  losartan (COZAAR) 50 MG tablet, Take 1 tablet by mouth 2 (Two) Times a Day., Disp: 90 tablet, Rfl: 3  •  montelukast (SINGULAIR) 10 MG tablet, Take 1 tablet by mouth Every Night.,  "Disp: , Rfl:   •  pantoprazole (PROTONIX) 40 MG EC tablet, Take 1 tablet by mouth Daily., Disp: , Rfl:   •  spironolactone-hydrochlorothiazide (ALDACTAZIDE) 25-25 MG tablet, Take 1 tablet by mouth Every Other Day., Disp: 45 tablet, Rfl: 3  •  traZODone (DESYREL) 50 MG tablet, Take 1 tablet by mouth Every Night., Disp: , Rfl:   •  doxazosin (CARDURA) 2 MG tablet, TAKE 1 TABLET BY MOUTH EVERY NIGHT AT BEDTIME for htn, Disp: , Rfl:   •  meloxicam (MOBIC) 15 MG tablet, Take 1 tablet by mouth Daily., Disp: , Rfl:     No Known Allergies     Family History   Problem Relation Age of Onset   • Heart disease Father    • Heart attack Father 57        MI   • Diabetes Brother    • Colon cancer Other    • Malig Hyperthermia Neg Hx        Social History     Socioeconomic History   • Marital status:    Tobacco Use   • Smoking status: Former     Packs/day: 0.50     Years: 12.00     Pack years: 6.00     Types: Cigarettes     Quit date:      Years since quittin.3     Passive exposure: Past   • Smokeless tobacco: Former     Types: Chew     Quit date:    • Tobacco comments:     QUIT    Vaping Use   • Vaping Use: Never used   Substance and Sexual Activity   • Alcohol use: Yes     Alcohol/week: 3.0 standard drinks     Types: 2 Cans of beer, 1 Shots of liquor per week     Comment: occ   • Drug use: Never   • Sexual activity: Defer       Vital Signs:   Resp 16   Ht 175.3 cm (69\")   Wt 109 kg (240 lb 3.2 oz)   BMI 35.47 kg/m²      Physical Exam  Vitals reviewed.   Constitutional:       Appearance: Normal appearance.   Neurological:      General: No focal deficit present.      Mental Status: He is alert and oriented to person, place, and time.   Psychiatric:         Mood and Affect: Mood normal.         Behavior: Behavior normal.          Result Review :   The following data was reviewed by: PREMA Mercado on 2023:       Bladder Scan interpretation 2023    Estimation of residual urine via " BVI 3000 Verathon Bladder Scan  MA/nurse performing: Marlene SHEPPARD MA  Residual Urine: 0 ml  Indication: Gross hematuria   Position: Supine  Examination: Incremental scanning of the suprapubic area using 2.0 MHz transducer using copious amounts of acoustic gel.   Findings: An anechoic area was demonstrated which represented the bladder, with measurement of residual urine as noted. I inspected this myself. In that the residual urine was  insignificant, refer to plan for treatment and plan necessary at this time.           Procedures        Assessment and Plan    Diagnoses and all orders for this visit:    1. Gross hematuria (Primary)  -     Bladder Scan  -     POC Urinalysis Dipstick, Automated    After discussion patient decided that he did want to go ahead and have cystoscopy with bilateral retrogrades done in the OR.  We did get him on the schedule for May 10.  Discussed that he does need to be n.p.o. after midnight and stop his aspirin 5 days before the procedure.  He was advised that someone from the hospital will call him the day before to tell him his arrival time.  He will meet Dr. Lagunas the day of surgery.  We did discuss risk and benefits.      Follow Up   No follow-ups on file.  Patient was given instructions and counseling regarding his condition or for health maintenance advice. Please see specific information pulled into the AVS if appropriate.         This document has been electronically signed by PREMA Mercado  May 2, 2023 09:27 EDT

## 2023-05-02 ENCOUNTER — OFFICE VISIT (OUTPATIENT)
Dept: UROLOGY | Facility: CLINIC | Age: 73
End: 2023-05-02
Payer: MEDICARE

## 2023-05-02 ENCOUNTER — PREP FOR SURGERY (OUTPATIENT)
Dept: OTHER | Facility: HOSPITAL | Age: 73
End: 2023-05-02
Payer: COMMERCIAL

## 2023-05-02 VITALS — BODY MASS INDEX: 35.58 KG/M2 | WEIGHT: 240.2 LBS | RESPIRATION RATE: 16 BRPM | HEIGHT: 69 IN

## 2023-05-02 DIAGNOSIS — R31.0 GROSS HEMATURIA: Primary | ICD-10-CM

## 2023-05-02 LAB
BILIRUB BLD-MCNC: ABNORMAL MG/DL
CLARITY, POC: CLEAR
COLOR UR: YELLOW
EXPIRATION DATE: ABNORMAL
GLUCOSE UR STRIP-MCNC: NEGATIVE MG/DL
KETONES UR QL: ABNORMAL
LEUKOCYTE EST, POC: NEGATIVE
Lab: ABNORMAL
NITRITE UR-MCNC: NEGATIVE MG/ML
PH UR: 5 [PH] (ref 5–8)
PROT UR STRIP-MCNC: NEGATIVE MG/DL
RBC # UR STRIP: NEGATIVE /UL
SP GR UR: 1.02 (ref 1–1.03)
URINE VOLUME: 0
UROBILINOGEN UR QL: ABNORMAL

## 2023-05-02 RX ORDER — SODIUM CHLORIDE 9 MG/ML
100 INJECTION, SOLUTION INTRAVENOUS CONTINUOUS
OUTPATIENT
Start: 2023-05-02

## 2023-05-02 RX ORDER — LEVOFLOXACIN 5 MG/ML
500 INJECTION, SOLUTION INTRAVENOUS ONCE
OUTPATIENT
Start: 2023-05-02 | End: 2023-05-02

## 2023-05-02 RX ORDER — DOXAZOSIN 2 MG/1
TABLET ORAL
COMMUNITY
Start: 2023-04-28

## 2023-05-02 RX ORDER — MELOXICAM 15 MG/1
1 TABLET ORAL DAILY
COMMUNITY
Start: 2023-04-28

## 2023-05-08 NOTE — PRE-PROCEDURE INSTRUCTIONS
Patient instructed to have no food past midnight, clears up to 2 hours prior to arrival time. Patient instructed to wear no lotions, jewelry or piercing's day of surgery. Patient to take amlodipine, allegra, levothyroxine, Protonix.

## 2023-05-09 DIAGNOSIS — I25.10 CORONARY ARTERY DISEASE INVOLVING NATIVE HEART WITHOUT ANGINA PECTORIS, UNSPECIFIED VESSEL OR LESION TYPE: ICD-10-CM

## 2023-05-09 DIAGNOSIS — I10 HYPERTENSION, ESSENTIAL: ICD-10-CM

## 2023-05-09 RX ORDER — LOSARTAN POTASSIUM 50 MG/1
TABLET ORAL
Qty: 180 TABLET | Refills: 3 | Status: SHIPPED | OUTPATIENT
Start: 2023-05-09

## 2023-05-10 ENCOUNTER — ANESTHESIA (OUTPATIENT)
Dept: PERIOP | Facility: HOSPITAL | Age: 73
End: 2023-05-10
Payer: MEDICARE

## 2023-05-10 ENCOUNTER — HOSPITAL ENCOUNTER (OUTPATIENT)
Facility: HOSPITAL | Age: 73
Setting detail: HOSPITAL OUTPATIENT SURGERY
Discharge: HOME | End: 2023-05-10
Attending: UROLOGY | Admitting: UROLOGY
Payer: MEDICARE

## 2023-05-10 ENCOUNTER — APPOINTMENT (OUTPATIENT)
Dept: GENERAL RADIOLOGY | Facility: HOSPITAL | Age: 73
End: 2023-05-10
Payer: MEDICARE

## 2023-05-10 ENCOUNTER — ANESTHESIA EVENT (OUTPATIENT)
Dept: PERIOP | Facility: HOSPITAL | Age: 73
End: 2023-05-10
Payer: MEDICARE

## 2023-05-10 VITALS
HEART RATE: 68 BPM | WEIGHT: 236.77 LBS | TEMPERATURE: 97 F | SYSTOLIC BLOOD PRESSURE: 130 MMHG | RESPIRATION RATE: 16 BRPM | DIASTOLIC BLOOD PRESSURE: 76 MMHG | BODY MASS INDEX: 35.07 KG/M2 | OXYGEN SATURATION: 100 % | HEIGHT: 69 IN

## 2023-05-10 DIAGNOSIS — R31.0 GROSS HEMATURIA: ICD-10-CM

## 2023-05-10 PROCEDURE — 25510000001 IOPAMIDOL PER 1 ML: Performed by: UROLOGY

## 2023-05-10 PROCEDURE — C1758 CATHETER, URETERAL: HCPCS | Performed by: UROLOGY

## 2023-05-10 PROCEDURE — 25010000002 MIDAZOLAM PER 1MG: Performed by: ANESTHESIOLOGY

## 2023-05-10 PROCEDURE — 25010000002 LEVOFLOXACIN PER 250 MG: Performed by: NURSE PRACTITIONER

## 2023-05-10 PROCEDURE — 25010000002 PROPOFOL 10 MG/ML EMULSION: Performed by: NURSE ANESTHETIST, CERTIFIED REGISTERED

## 2023-05-10 PROCEDURE — 52005 CYSTO W/URTRL CATHJ: CPT | Performed by: UROLOGY

## 2023-05-10 PROCEDURE — 74420 UROGRAPHY RTRGR +-KUB: CPT

## 2023-05-10 PROCEDURE — C1769 GUIDE WIRE: HCPCS | Performed by: UROLOGY

## 2023-05-10 RX ORDER — MEPERIDINE HYDROCHLORIDE 25 MG/ML
12.5 INJECTION INTRAMUSCULAR; INTRAVENOUS; SUBCUTANEOUS
Status: DISCONTINUED | OUTPATIENT
Start: 2023-05-10 | End: 2023-05-10 | Stop reason: HOSPADM

## 2023-05-10 RX ORDER — PROMETHAZINE HYDROCHLORIDE 25 MG/1
25 SUPPOSITORY RECTAL ONCE AS NEEDED
Status: DISCONTINUED | OUTPATIENT
Start: 2023-05-10 | End: 2023-05-10 | Stop reason: HOSPADM

## 2023-05-10 RX ORDER — GLYCOPYRROLATE 0.2 MG/ML
0.2 INJECTION INTRAMUSCULAR; INTRAVENOUS
Status: COMPLETED | OUTPATIENT
Start: 2023-05-10 | End: 2023-05-10

## 2023-05-10 RX ORDER — PROMETHAZINE HYDROCHLORIDE 12.5 MG/1
12.5 TABLET ORAL ONCE AS NEEDED
Status: DISCONTINUED | OUTPATIENT
Start: 2023-05-10 | End: 2023-05-10 | Stop reason: HOSPADM

## 2023-05-10 RX ORDER — ONDANSETRON 2 MG/ML
4 INJECTION INTRAMUSCULAR; INTRAVENOUS ONCE AS NEEDED
Status: DISCONTINUED | OUTPATIENT
Start: 2023-05-10 | End: 2023-05-10 | Stop reason: HOSPADM

## 2023-05-10 RX ORDER — LEVOFLOXACIN 5 MG/ML
500 INJECTION, SOLUTION INTRAVENOUS ONCE
Status: COMPLETED | OUTPATIENT
Start: 2023-05-10 | End: 2023-05-10

## 2023-05-10 RX ORDER — LIDOCAINE HYDROCHLORIDE 20 MG/ML
INJECTION, SOLUTION EPIDURAL; INFILTRATION; INTRACAUDAL; PERINEURAL AS NEEDED
Status: DISCONTINUED | OUTPATIENT
Start: 2023-05-10 | End: 2023-05-10 | Stop reason: SURG

## 2023-05-10 RX ORDER — MAGNESIUM HYDROXIDE 1200 MG/15ML
LIQUID ORAL AS NEEDED
Status: DISCONTINUED | OUTPATIENT
Start: 2023-05-10 | End: 2023-05-10 | Stop reason: HOSPADM

## 2023-05-10 RX ORDER — ACETAMINOPHEN 500 MG
1000 TABLET ORAL ONCE
Status: COMPLETED | OUTPATIENT
Start: 2023-05-10 | End: 2023-05-10

## 2023-05-10 RX ORDER — PROMETHAZINE HYDROCHLORIDE 12.5 MG/1
25 TABLET ORAL ONCE AS NEEDED
Status: DISCONTINUED | OUTPATIENT
Start: 2023-05-10 | End: 2023-05-10 | Stop reason: HOSPADM

## 2023-05-10 RX ORDER — ACETAMINOPHEN 325 MG/1
650 TABLET ORAL ONCE
Status: DISCONTINUED | OUTPATIENT
Start: 2023-05-10 | End: 2023-05-10 | Stop reason: HOSPADM

## 2023-05-10 RX ORDER — OXYCODONE HYDROCHLORIDE 5 MG/1
5 TABLET ORAL
Status: DISCONTINUED | OUTPATIENT
Start: 2023-05-10 | End: 2023-05-10 | Stop reason: HOSPADM

## 2023-05-10 RX ORDER — SODIUM CHLORIDE, SODIUM LACTATE, POTASSIUM CHLORIDE, CALCIUM CHLORIDE 600; 310; 30; 20 MG/100ML; MG/100ML; MG/100ML; MG/100ML
9 INJECTION, SOLUTION INTRAVENOUS CONTINUOUS PRN
Status: DISCONTINUED | OUTPATIENT
Start: 2023-05-10 | End: 2023-05-10 | Stop reason: HOSPADM

## 2023-05-10 RX ORDER — PROPOFOL 10 MG/ML
VIAL (ML) INTRAVENOUS AS NEEDED
Status: DISCONTINUED | OUTPATIENT
Start: 2023-05-10 | End: 2023-05-10 | Stop reason: SURG

## 2023-05-10 RX ORDER — ASPIRIN 81 MG/1
81 TABLET, CHEWABLE ORAL EVERY OTHER DAY
COMMUNITY

## 2023-05-10 RX ORDER — SODIUM CHLORIDE 9 MG/ML
100 INJECTION, SOLUTION INTRAVENOUS CONTINUOUS
Status: DISCONTINUED | OUTPATIENT
Start: 2023-05-10 | End: 2023-05-10 | Stop reason: HOSPADM

## 2023-05-10 RX ORDER — MIDAZOLAM HYDROCHLORIDE 2 MG/2ML
0.5 INJECTION, SOLUTION INTRAMUSCULAR; INTRAVENOUS ONCE
Status: COMPLETED | OUTPATIENT
Start: 2023-05-10 | End: 2023-05-10

## 2023-05-10 RX ORDER — ONDANSETRON 4 MG/1
4 TABLET, FILM COATED ORAL ONCE AS NEEDED
Status: DISCONTINUED | OUTPATIENT
Start: 2023-05-10 | End: 2023-05-10 | Stop reason: HOSPADM

## 2023-05-10 RX ADMIN — OXYCODONE HYDROCHLORIDE 5 MG: 5 TABLET ORAL at 13:27

## 2023-05-10 RX ADMIN — LEVOFLOXACIN 500 MG: 500 INJECTION, SOLUTION INTRAVENOUS at 12:59

## 2023-05-10 RX ADMIN — PROPOFOL 80 MG: 10 INJECTION, EMULSION INTRAVENOUS at 12:55

## 2023-05-10 RX ADMIN — PROPOFOL 150 MCG/KG/MIN: 10 INJECTION, EMULSION INTRAVENOUS at 12:55

## 2023-05-10 RX ADMIN — MIDAZOLAM HYDROCHLORIDE 0.5 MG: 1 INJECTION, SOLUTION INTRAMUSCULAR; INTRAVENOUS at 12:41

## 2023-05-10 RX ADMIN — SODIUM CHLORIDE, POTASSIUM CHLORIDE, SODIUM LACTATE AND CALCIUM CHLORIDE 9 ML/HR: 600; 310; 30; 20 INJECTION, SOLUTION INTRAVENOUS at 12:09

## 2023-05-10 RX ADMIN — GLYCOPYRROLATE 0.2 MG: 0.2 INJECTION INTRAMUSCULAR; INTRAVENOUS at 12:41

## 2023-05-10 RX ADMIN — ACETAMINOPHEN 1000 MG: 500 TABLET ORAL at 12:09

## 2023-05-10 RX ADMIN — LIDOCAINE HYDROCHLORIDE 30 MG: 20 INJECTION, SOLUTION EPIDURAL; INFILTRATION; INTRACAUDAL; PERINEURAL at 12:54

## 2023-05-10 NOTE — DISCHARGE INSTRUCTIONS
DISCHARGE INSTRUCTIONS CYSTOSCOPY      For your surgery you had:  Monitored anesthesia care  You may experience dizziness, drowsiness, or lightheadedness for several hours following surgery.  Do not stay alone today or tonight.  Limit your activity for 24 hours.  You should not drive, operate machinery, drink alcohol, or sign legally binding documents for 24 hours or while you are taking pain medication.  Resume your diet slowly.  Follow any special dietary instructions you may have been given by your doctor.    Last dose of pain medication given at:   TYLENOL 1000 MG AT 12:09 PM  OXY IR 5 MG AT 1:27 PM.    NOTIFY YOUR DOCTOR IF YOU EXPERIENCE ANY OF THE FOLLOWING:  Temperature greater than 101 degrees Fahrenheit  Shaking Chills  Redness or excessive drainage from incision  Nausea, vomiting and/or pain that is not controlled by prescribed medications  Increase in bleeding or bleeding that is excessive  Unable to urinate in 6 hours after surgery  If unable to reach your doctor, please go to the closest Emergency Room   Following your cystoscopy exam, you may experience burning upon urination.  You may also pass some bloody urine.  If the burning sensation and/or bloody urine should persist beyond 48 hours, call your doctor.  To encourage kidney and bladder function, you should drink as much fluid as possible.  If you have difficulty urinating, try sitting in a bath tub of warm water.  If you become uncomfortable because you cannot urinate, call your doctor or come to the Emergency Room at the hospital.  Medications per physician instructions as indicated on Discharge Medication Information Sheet.      SPECIAL INSTRUCTIONS:  Follow verbal instructions given by Dr. Lagunas.

## 2023-05-10 NOTE — ANESTHESIA PREPROCEDURE EVALUATION
Anesthesia Evaluation     Patient summary reviewed and Nursing notes reviewed                Airway   Mallampati: I  TM distance: >3 FB  Neck ROM: full  No difficulty expected  Dental      Pulmonary - normal exam    breath sounds clear to auscultation  (+) COPD, shortness of breath, sleep apnea,   Cardiovascular - normal exam    Rhythm: regular  Rate: normal    (+) hypertension, CAD, hyperlipidemia,       Neuro/Psych- negative ROS  GI/Hepatic/Renal/Endo    (+) obesity,  GERD,  thyroid problem hypothyroidism    Musculoskeletal     Abdominal    Substance History - negative use     OB/GYN negative ob/gyn ROS         Other   arthritis,    history of cancer    ROS/Med Hx Other: Sick sinus syndrome                  Anesthesia Plan    ASA 4     general     intravenous induction     Anesthetic plan, risks, benefits, and alternatives have been provided, discussed and informed consent has been obtained with: patient.        CODE STATUS:

## 2023-05-10 NOTE — H&P
[facility]   UROLOGY HISTORY AND PHYSICAL    Patient Name: Percy Velarde  : 1950  MRN: [id number]  Primary Care Physician:  Agustin Balbuena MD  Date of admission: 5/10/2023    Subjective   Subjective     Chief Complaint:     Gross hematuria        HPI:    Percy Velarde is a 73 y.o. male     gross hematuria no change in H&P    Review of Systems     10 systems reviewed and are negative other than what is listed in HPI    Personal History     Past Medical History:   Diagnosis Date   • Anemia    • Arthritis    • COPD (chronic obstructive pulmonary disease)    • Coronary artery disease involving native heart without angina pectoris 2021    Follows with Dr. Winters   • ETD (Eustachian tube dysfunction), bilateral    • GERD (gastroesophageal reflux disease)    • Gout     no issues for several years   • H/O arthroscopy of shoulder 2017    aftercare following, right    • Hearing loss    • History of arthroscopy of right shoulder 2018   • Hyperlipemia    • Hypertension, essential    • Hypothyroidism    • Limb swelling     feet ankles   • Lipoma     removed   • Low back pain    • Melanoma     left arm removed   • Nasal septal ulcer    • Otalgia    • Personal history of colonic polyps 2014   • Recurrent epistaxis     no current issues   • Rotator cuff tear arthropathy of right shoulder 2017    repaired   • S/P TKR (total knee replacement), right     2023   • Seasonal allergies    • Sleep apnea     does not use CPAP   • Tinnitus, bilateral        Past Surgical History:   Procedure Laterality Date   • CHOLECYSTECTOMY     • COLONOSCOPY  12/10/2019    DR. LANE   • COLONOSCOPY N/A 2023    Procedure: COLONOSCOPY WITH COLD SNARE POLYPECTOMY;  Surgeon: Mich Lane MD;  Location: [facility] ENDOSCOPY;  Service: Gastroenterology;  Laterality: N/A;  COLON POLYP. DIVERTICULOSIS   • ENDOSCOPY     • ENDOSCOPY N/A 2021    Procedure: ESOPHAGOGASTRODUODENOSCOPY with biopsy;   Surgeon: Mich Lane MD;  Location: [facility] ENDOSCOPY;  Service: Gastroenterology;  Laterality: N/A;  gastric polyps   • LIPOMA EXCISION  02/26/2019    removal  neck   • MYRINGOTOMY W/ TUBES      states had had several pairs placed by Dr. Kaba    • OTHER SURGICAL HISTORY Left 2013    melanoma excision from shoulder    • ROTATOR CUFF REPAIR Right 2017   • TONSILLECTOMY     • TOTAL KNEE ARTHROPLASTY Right 2/13/2023    Procedure: RIGHT TOTAL KNEE ARTHROPLASTY.;  Surgeon: Bao Vigil MD;  Location: [facility] MAIN OR;  Service: Orthopedics;  Laterality: Right;   • UPPER GASTROINTESTINAL ENDOSCOPY         Family History: family history includes Colon cancer in an other family member; Diabetes in his brother; Heart attack (age of onset: 57) in his father; Heart disease in his father. Otherwise pertinent FHx was reviewed and not pertinent to current issue.    Social History:  reports that he quit smoking about 48 years ago. His smoking use included cigarettes. He has a 6.00 pack-year smoking history. He has been exposed to tobacco smoke. He quit smokeless tobacco use about 17 years ago.  His smokeless tobacco use included chew. He reports current alcohol use of about 3.0 standard drinks per week. He reports that he does not use drugs.    Home Medications:  allopurinol, amLODIPine, ascorbic acid, aspirin, atorvastatin, doxazosin, fexofenadine, fluticasone, levothyroxine, losartan, meloxicam, montelukast, pantoprazole, spironolactone-hydrochlorothiazide, and traZODone      Allergies:  No Known Allergies    Objective   Objective     Vitals:   Temp:  [97.9 °F (36.6 °C)] 97.9 °F (36.6 °C)  Heart Rate:  [52] 52  Resp:  [18] 18  BP: (135)/(67) 135/67  Physical Exam    Constitutional: Awake, alert    Respiratory: Clear to auscultation bilaterally, nonlabored respirations    Cardiovascular: RRR, no murmurs, rubs, or gallops, palpable pedal pulses bilaterally   Gastrointestinal: Positive bowel sounds, soft, nontender,  nondistended   Musculoskeletal: No bilateral ankle edema, no clubbing or cyanosis to extremities   I have personally reviewed the results from the time of this admission to 5/10/2023 12:04 EDT and agree with these findings:  []  Laboratory  []  Microbiology  []  Radiology  []  EKG/Telemetry   []  Cardiology/Vascular   []  Pathology  []  Old records  []  Other:    Assessment & Plan   Assessment / Plan     Brief Patient Summary:  Percy Velarde is a 73 y.o. male     Active Hospital Problems:  Active Hospital Problems    Diagnosis    • **Gross hematuria        Plan:       Cystoscopy bilateral retrogrades.  Risks and benefits were discussed including bleeding, infection and damage to the urinary system.  We also discussed the risk of anesthesia up to and including death.  Patient voiced understanding and would like to proceed.  Electronically signed by Luke Lagunas MD, 05/10/23, 12:04 PM EDT.

## 2023-05-10 NOTE — OP NOTE
CYSTOSCOPY URETEROSCOPY  Procedure Report    Patient Name:  Percy Velarde  YOB: 1950    Date of Surgery:  5/10/2023      Pre-op Diagnosis:   Gross hematuria [R31.0]       Postop diagnosis:    Same    Procedure/CPT® Codes:      Procedure(s):    CYSTOSCOPY   Bilateral retrograde pyelogram    Staff:  Surgeon(s):  Luke Lagunas MD    Assistant: Jose Gallegos RN CSA    Anesthesia: Monitored Anesthesia Care    Estimated Blood Loss: minimal    Implants:    Nothing was implanted during the procedure    Specimen:          None        Findings:     3 cm prostate  Normal bladder with some minor to moderate trabeculation, no pathology    Normal bilateral retrograde pyelogram        Complications: none    Description of Procedure:     After informed consent patient was taken to the operating room.  Patient placed supine and placed under monitored anesthesia care by the anesthesia team.  Patient then placed in dorsal lithotomy position.  Prepped and draped in normal sterile fashion.  A multidisciplinary timeout was undertaken document the correct patient site and procedure.    22 rigid cystoscope was placed into urethra.  Anterior urethra was normal.  Prostatic urethra showed            3 cm prostate.  Bladder was examined systematically.  No pathology was seen.  Bilateral ureteral orifices were in normal anatomic location.  Retrograde pyelograms were undertaken starting on the left with a cone-tip catheter.  No filling defects, no hydronephrosis and drained well.  Also did the same thing on the right side.  No filling defects, no hydronephrosis and drained well.    Patient tolerated procedure well.  Bladder was drained and the procedure was ended.  Patient was awakened and taken to the postanesthesia care unit without problem.      Assistant: Jose Gallegos RN CSA  was responsible for performing the following activities: Held/Positioned Camera and their skilled assistance was necessary  for the success of this case.    Luke Lagunas MD     Date: 5/10/2023  Time: 13:16 EDT

## 2023-05-10 NOTE — ANESTHESIA POSTPROCEDURE EVALUATION
Patient: Percy Velarde    Procedure Summary     Date: 05/10/23 Room / Location: [facility] OR 07 / [facility] MAIN OR    Anesthesia Start: 1249 Anesthesia Stop: 1319    Procedure: CYSTOSCOPY RETROGRADE PYELOGRAM (Bilateral) Diagnosis:       Gross hematuria      (Gross hematuria [R31.0])    Surgeons: Luke Lagunas MD Provider: Kb Mason MD    Anesthesia Type: general ASA Status: 4          Anesthesia Type: general    Vitals  Vitals Value Taken Time   /74 05/10/23 1404   Temp 36.2 °C (97.2 °F) 05/10/23 1404   Pulse 69 05/10/23 1404   Resp 16 05/10/23 1404   SpO2 100 % 05/10/23 1404           Post Anesthesia Care and Evaluation    Patient location during evaluation: bedside  Patient participation: complete - patient participated  Level of consciousness: awake  Pain management: adequate    Airway patency: patent  PONV Status: none  Cardiovascular status: acceptable and stable  Respiratory status: acceptable  Hydration status: acceptable    Comments: An Anesthesiologist personally participated in the most demanding procedures (including induction and emergence if applicable) in the anesthesia plan, monitored the course of anesthesia administration at frequent intervals and remained physically present and available for immediate diagnosis and treatment of emergencies.

## 2023-05-21 DIAGNOSIS — M17.0 BILATERAL PRIMARY OSTEOARTHRITIS OF KNEE: ICD-10-CM

## 2023-05-22 RX ORDER — MELOXICAM 15 MG/1
TABLET ORAL
Qty: 30 TABLET | Refills: 1 | Status: SHIPPED | OUTPATIENT
Start: 2023-05-22

## 2023-05-26 ENCOUNTER — OFFICE VISIT (OUTPATIENT)
Dept: UROLOGY | Facility: CLINIC | Age: 73
End: 2023-05-26
Payer: MEDICARE

## 2023-05-26 VITALS — WEIGHT: 236 LBS | RESPIRATION RATE: 16 BRPM | HEIGHT: 69 IN | BODY MASS INDEX: 34.96 KG/M2

## 2023-05-26 DIAGNOSIS — R31.0 GROSS HEMATURIA: Primary | ICD-10-CM

## 2023-05-26 NOTE — PROGRESS NOTES
Chief Complaint: Urologic complaint    Subjective         History of Present Illness  Percy Velarde is a 73 y.o. male     GH  BPH      Patient does have slower stream for years, takes a while to void.   No straining.  Nocturia times about 1-2.  Not bothered.    Recently started on doxazosin 2 mg QD  for BP.    5/10/2023 cystoscopy bilateral retrogrades -3 cm prostate, monitor moderate trabeculation.  No pathology.  Normal bilateral retrogrades  3/23 CT abdomen/pelvis without - negative    5/23  NP   -1 episode of gross hematuria in 3/23.    Has frequency/urgency.  Occasional incontinence nocturia x1.  Slow stream     COPD, hypertension, no CAD, aspirin 81, former smoker      PVR    3/23   000        PSA     3/31/2023    0.6        Objective     Past Medical History:   Diagnosis Date   • Anemia    • Arthritis    • COPD (chronic obstructive pulmonary disease)    • Coronary artery disease involving native heart without angina pectoris 11/24/2021    Follows with Dr. Winters   • ETD (Eustachian tube dysfunction), bilateral    • GERD (gastroesophageal reflux disease)    • Gout     no issues for several years   • H/O arthroscopy of shoulder 09/28/2017    aftercare following, right    • Hearing loss    • History of arthroscopy of right shoulder 01/11/2018   • Hyperlipemia    • Hypertension, essential    • Hypothyroidism    • Limb swelling     feet ankles   • Lipoma     removed   • Low back pain    • Melanoma     left arm removed   • Nasal septal ulcer    • Otalgia    • Personal history of colonic polyps 07/14/2014   • Recurrent epistaxis     no current issues   • Rotator cuff tear arthropathy of right shoulder 08/01/2017    repaired   • S/P TKR (total knee replacement), right     2/13/2023   • Seasonal allergies    • Sleep apnea     does not use CPAP   • Tinnitus, bilateral        Past Surgical History:   Procedure Laterality Date   • CHOLECYSTECTOMY     • COLONOSCOPY  12/10/2019    DR. RONDON   • COLONOSCOPY N/A 02/06/2023     Procedure: COLONOSCOPY WITH COLD SNARE POLYPECTOMY;  Surgeon: Mich Lane MD;  Location: [facility] ENDOSCOPY;  Service: Gastroenterology;  Laterality: N/A;  COLON POLYP. DIVERTICULOSIS   • CYSTOSCOPY URETEROSCOPY Bilateral 5/10/2023    Procedure: CYSTOSCOPY RETROGRADE PYELOGRAM;  Surgeon: Luke Lagunas MD;  Location: [facility] MAIN OR;  Service: Urology;  Laterality: Bilateral;   • ENDOSCOPY     • ENDOSCOPY N/A 09/20/2021    Procedure: ESOPHAGOGASTRODUODENOSCOPY with biopsy;  Surgeon: Mich Lane MD;  Location: [facility] ENDOSCOPY;  Service: Gastroenterology;  Laterality: N/A;  gastric polyps   • LIPOMA EXCISION  02/26/2019    removal  neck   • MYRINGOTOMY W/ TUBES      states had had several pairs placed by Dr. Kaba    • OTHER SURGICAL HISTORY Left 2013    melanoma excision from shoulder    • ROTATOR CUFF REPAIR Right 2017   • TONSILLECTOMY     • TOTAL KNEE ARTHROPLASTY Right 2/13/2023    Procedure: RIGHT TOTAL KNEE ARTHROPLASTY.;  Surgeon: Bao Vigil MD;  Location: [facility] MAIN OR;  Service: Orthopedics;  Laterality: Right;   • UPPER GASTROINTESTINAL ENDOSCOPY           Current Outpatient Medications:   •  allopurinol (ZYLOPRIM) 100 MG tablet, Take 1 tablet by mouth Every Night., Disp: , Rfl:   •  amLODIPine (NORVASC) 2.5 MG tablet, Take 1 tablet by mouth Daily., Disp: , Rfl:   •  ascorbic acid (VITAMIN C) 500 MG capsule controlled-release CR capsule, Take 1 capsule by mouth Daily., Disp: , Rfl:   •  aspirin 81 MG chewable tablet, Chew 1 tablet Every Other Day., Disp: , Rfl:   •  atorvastatin (LIPITOR) 40 MG tablet, Take 1 tablet by mouth Daily. (Patient taking differently: Take 1 tablet by mouth Every Night.), Disp: 90 tablet, Rfl: 3  •  doxazosin (CARDURA) 2 MG tablet, TAKE 1 TABLET BY MOUTH EVERY NIGHT AT BEDTIME for htn, Disp: , Rfl:   •  fexofenadine (ALLEGRA) 180 MG tablet, Take 1 tablet by mouth Daily., Disp: , Rfl:   •  fluticasone (FLONASE) 50 MCG/ACT nasal spray, 1 spray into  "the nostril(s) as directed by provider Every Night., Disp: , Rfl:   •  levothyroxine (SYNTHROID, LEVOTHROID) 137 MCG tablet, Take 1 tablet by mouth Daily., Disp: , Rfl:   •  losartan (COZAAR) 50 MG tablet, TAKE 1 TABLET BY MOUTH TWICE DAILY, Disp: 180 tablet, Rfl: 3  •  meloxicam (MOBIC) 15 MG tablet, TAKE 1 TABLET BY MOUTH EVERY DAY, Disp: 30 tablet, Rfl: 1  •  montelukast (SINGULAIR) 10 MG tablet, Take 1 tablet by mouth Every Night., Disp: , Rfl:   •  pantoprazole (PROTONIX) 40 MG EC tablet, Take 1 tablet by mouth Daily., Disp: , Rfl:   •  spironolactone-hydrochlorothiazide (ALDACTAZIDE) 25-25 MG tablet, Take 1 tablet by mouth Every Other Day., Disp: 45 tablet, Rfl: 3  •  traZODone (DESYREL) 50 MG tablet, Take 1 tablet by mouth Every Night., Disp: , Rfl:     No Known Allergies     Family History   Problem Relation Age of Onset   • Heart disease Father    • Heart attack Father 57        MI   • Diabetes Brother    • Colon cancer Other    • Malig Hyperthermia Neg Hx        Social History     Socioeconomic History   • Marital status:    Tobacco Use   • Smoking status: Former     Packs/day: 0.50     Years: 12.00     Pack years: 6.00     Types: Cigarettes     Quit date:      Years since quittin.4     Passive exposure: Past   • Smokeless tobacco: Former     Types: Chew     Quit date:    • Tobacco comments:     QUIT    Vaping Use   • Vaping Use: Never used   Substance and Sexual Activity   • Alcohol use: Yes     Alcohol/week: 3.0 standard drinks     Types: 2 Cans of beer, 1 Shots of liquor per week     Comment: occ   • Drug use: Never   • Sexual activity: Defer       Vital Signs:   Resp 16   Ht 175.3 cm (69\")   Wt 107 kg (236 lb)   BMI 34.85 kg/m²        Results for orders placed or performed in visit on 23   Bladder Scan   Result Value Ref Range    Urine Volume 0    POC Urinalysis Dipstick, Automated    Specimen: Urine   Result Value Ref Range    Color Yellow Yellow, Straw, Dark Yellow, " Stephanie    Clarity, UA Clear Clear    Specific Gravity  1.025 1.005 - 1.030    pH, Urine 5.0 5.0 - 8.0    Leukocytes Negative Negative    Nitrite, UA Negative Negative    Protein, POC Negative Negative mg/dL    Glucose, UA Negative Negative mg/dL    Ketones, UA 15 mg/dL (A) Negative    Urobilinogen, UA 0.2 E.U./dL Normal, 0.2 E.U./dL    Bilirubin Small (1+) (A) Negative    Blood, UA Negative Negative    Lot Number [id number]     Expiration Date 6/2,024              Assessment and Plan    Diagnoses and all orders for this visit:    1. Gross hematuria (Primary)         CT and cystoscopy retrogrades normal.  No signs of  pathology    Voiding without issue.   patient given reassurance      PCP does check urine periodically.    He will follow-up with PCP

## 2023-07-13 PROBLEM — I51.9 DIASTOLIC DYSFUNCTION, LEFT VENTRICLE: Status: ACTIVE | Noted: 2023-07-13

## 2023-09-06 NOTE — H&P
[facility]   HOSPITALIST HISTORY AND PHYSICAL  Date: 2023   Patient Name: Percy Velarde  : 1950  MRN: [id number]  Primary Care Physician:  Agustin Balbuena MD  Date of admission: 2023    Subjective   Subjective   Chief Complaint: Medical management    HPI:    Percy Velarde is a 73 y.o. male is being seen by hospitalist group for medical management.  He has a history of dyslipidemia, hypothyroidism, gout, HTN, severe osteoarthritis, allergies, chronic bronchitis, CKD2-3, and CAD.  He is regularly followed by cardiology and saw Dr. Winters last month prior to upcoming knee surgery.  He was cleared for surgery at moderate risk and told to hold aspirin 5-7 days prior.  Dr. Vigil performed right total knee arthroplasty 23.  Recent labs show normal CBC and BMP with recent A1c 5.3.  Notably, creatinine 1.2.    Currently, resting in recliner.  He is alert and conversational.  Hard of hearing but in no particular distress currently.  His daughter is at bedside.  Vital signs are stable.  He has already taken a few steps on his new knee.  He denies any postop nausea at this point.  Has yet to void.  He is planning on returning home tomorrow and is going to have home health therapy set up initially with eventual goal of outpatient PT.    Pertinent History   Past Medical History:    Active Ambulatory Problems     Diagnosis Date Noted   • Gastroesophageal reflux disease 2021   • Hyperlipemia 2021   • Hypertension, essential 2021   • Hypothyroidism 2021   • Coronary artery disease involving native heart without angina pectoris 2021   • Sick sinus syndrome (HCC) 2021   • SOB (shortness of breath) on exertion 2022   • Family history of colon cancer 2022   • OA (osteoarthritis) of knee 2023     Resolved Ambulatory Problems     Diagnosis Date Noted   • No Resolved Ambulatory Problems     Past Medical History:   Diagnosis Date   • Anemia    • Arthritis     • COPD (chronic obstructive pulmonary disease) ([facility])    • ETD (Eustachian tube dysfunction), bilateral    • GERD (gastroesophageal reflux disease)    • Gout    • H/O arthroscopy of shoulder 09/28/2017   • Hearing loss    • History of arthroscopy of right shoulder 01/11/2018   • Limb swelling    • Lipoma    • Low back pain    • Melanoma (HCC)    • Nasal septal ulcer    • Otalgia    • Personal history of colonic polyps 07/14/2014   • Recurrent epistaxis    • Rotator cuff tear arthropathy of right shoulder 08/01/2017   • Seasonal allergies    • Sleep apnea    • Tinnitus, bilateral        Past Surgical History:  Right total knee replacement 2/13/23  Past Surgical History:   Procedure Laterality Date   • CHOLECYSTECTOMY     • COLONOSCOPY  12/10/2019    DR. LANE   • COLONOSCOPY N/A 02/06/2023    Procedure: COLONOSCOPY WITH COLD SNARE POLYPECTOMY;  Surgeon: Mich Lane MD;  Location: [facility] ENDOSCOPY;  Service: Gastroenterology;  Laterality: N/A;  COLON POLYP. DIVERTICULOSIS   • ENDOSCOPY     • ENDOSCOPY N/A 09/20/2021    Procedure: ESOPHAGOGASTRODUODENOSCOPY with biopsy;  Surgeon: Mich Lane MD;  Location: [facility] ENDOSCOPY;  Service: Gastroenterology;  Laterality: N/A;  gastric polyps   • LIPOMA EXCISION  02/26/2019    removal  neck   • MYRINGOTOMY W/ TUBES      states had had several pairs placed by Dr. Kaba    • OTHER SURGICAL HISTORY Left 2013    melanoma excision from shoulder    • ROTATOR CUFF REPAIR Right 2017   • TONSILLECTOMY     • TOTAL KNEE ARTHROPLASTY Right 2/13/2023    Procedure: RIGHT TOTAL KNEE ARTHROPLASTY.;  Surgeon: Bao Vigil MD;  Location: [facility] MAIN OR;  Service: Orthopedics;  Laterality: Right;   • UPPER GASTROINTESTINAL ENDOSCOPY         Social History:   Former smoker  Maintenance/Construction on [employer]  Active and independent  Lives alone in [place].  Has support from multiple family members involved.  No home O2. No nebulzier/MDI. Cannot tolerate CPAP x  years.  No cane, walker, etc.  Social History     Socioeconomic History   • Marital status:    Tobacco Use   • Smoking status: Former     Packs/day: 0.50     Years: 12.00     Pack years: 6.00     Types: Cigarettes     Quit date:      Years since quittin.1   • Smokeless tobacco: Former     Types: Chew     Quit date:    • Tobacco comments:     QUIT    Vaping Use   • Vaping Use: Never used   Substance and Sexual Activity   • Alcohol use: Yes     Alcohol/week: 3.0 standard drinks     Types: 2 Cans of beer, 1 Shots of liquor per week     Comment: occ   • Drug use: Never   • Sexual activity: Defer       Family History:     Family History   Problem Relation Age of Onset   • Heart disease Father    • Heart attack Father 57        MI   • Diabetes Brother    • Colon cancer Other    • Malig Hyperthermia Neg Hx        Home Medications (reported)  Current Outpatient Medications   Medication Instructions   • allopurinol (ZYLOPRIM) 100 mg, Oral, Nightly   • ascorbic acid (VITAMIN C) 500 mg, Daily   • aspirin 81 mg, Oral, Every Other Day   • atorvastatin (LIPITOR) 40 mg, Oral, Daily   • FeroSul 325 (65 Fe) MG tablet 1 tablet, Oral, Every Other Day   • fexofenadine (ALLEGRA) 180 mg, Oral, Daily   • fluticasone (FLONASE) 50 MCG/ACT nasal spray 1 spray, Nasal, Nightly   • levothyroxine (SYNTHROID, LEVOTHROID) 137 mcg, Oral, Daily   • losartan (COZAAR) 50 mg, Oral, 2 Times Daily   • meloxicam (MOBIC) 15 MG tablet TAKE 1 TABLET BY MOUTH EVERY DAY   • montelukast (SINGULAIR) 10 mg, Oral, Nightly   • pantoprazole (PROTONIX) 40 mg, Daily   • spironolactone-hydrochlorothiazide (ALDACTAZIDE) 25-25 MG tablet 1 tablet, Oral, Every Other Day   • traZODone (DESYREL) 50 mg, Oral, Nightly   • Zinc 50 MG capsule Oral       Allergies:  No Known Allergies    REVIEW OF SYSTEMS: All other systems reviewed and are negative.   • GEN: No fevers. No chills. No weight gain. No weight loss.     • HEENT:   No dysphagia/odynophagia. No  visual disturbance.    • GI:    No N/V.  No abd pain. No diarrhea. No constipation.  No bloody/black tarry stools. No hematemesis.   • CV: No chest discomfort.  No palps. No lightheadedness. No syncope. No orthopnea/PND. No edema.   • RESP:    No cough. No wheeze.  No increased sputum. No hemoptysis. No WOO.  • :   No dysuria or suprapubic discomfort. No frequency. No urgency.  + Hesitancy. No incontinence. No hematuria. No flank pain.    • MS:   + Right knee joint stiffness and pain. No myalgias.  + Right leg muscle weakness.    • SKIN:   No painful or pruritic rashes.  No skin discoloration.  • NEURO:  No focal numbness or weakness.  No headaches.  No ataxia. No slurred speech. No receptive/expressive aphasia.      • PSYCH:   No anxiety. No depression.  • ENDO:  No tremor, hair loss, heat or cold intolerance.    Objective   Objective   Vitals:   Temp:  [97.6 °F (36.4 °C)-98.7 °F (37.1 °C)] 97.8 °F (36.6 °C)  Heart Rate:  [47-76] 65  Resp:  [14-21] 16  BP: (113-159)/(68-99) 147/80  Flow (L/min):  [2] 2  PHYSICAL EXAM   • CON: WN. WD. NAD.   • EYES:  Sclera anicteric. EOMI. Normal conjunctiva.   • ENT:  Oral mucosa normal without ulcers or thrush.    • NECK:  No thyromegaly. No stridor. Trachea midline.  • RESP:  CTA. No wheezes. No crackles.  No work of breathing or tachypnea.   • CV:  Rhythm regular. Rate 55bpm. No murmur noted.  No edema.  • GI:  Soft and nontender. Nondistended.  Bowel sounds present.   • EXT: Right knee dressing intact.  Ice therapy on top.  Distally, RLE intact neurovascularly.  • LYMPH:  No lymphedema noted.  No cervical lymphadenopathy.  • PSYCH:  Alert. Oriented. Normal affect and mood.  • NEURO:  CNII-XII grossly intact. No dysarthria or aphasia. No unilateral weakness or paresthesia.  • SKIN: No chronic venous stasis changes or varicosities.  No cellulitis    Result Review    Result Review:  I have personally reviewed the results from the time of this admission to 2/13/2023 15:40 EST  and agree with these findings:  []  Laboratory  []  Microbiology  []  Radiology  []  EKG/Telemetry   []  Cardiology/Vascular   []  Pathology  []  Old records  []  Other:    Assessment & Plan   Assessment / Plan   Assessment:  • Medical management  • Hx of Bradycardia ([facility])  • Hx of RBBB (normal EF % 2022 echo)  • COPD/Chronic bronchitis, stable  • EDNA (could not tolerate home machine)  • HTN  • Dyslipidemia  • Hypothyroidism  • GERD  • Gout  • Obesity w BMI > 37  • Allergies  • Severe osteoarthritis, knees  • Right total knee replacement 2/13/23 by Dr. Vigil     Plan:  • Gentle IV fluid hydration overnight  • Hold home antihypertensives tonight .... Resume in a.m. if BP allows  • ETCO2 and overnight oximetry.  Monitor for oversedation  • Bowel regimen  • Oral and IV pain control  • PT and OT  • Continue home PPI, statin  • DVT prophylaxis per orthopedist, starting POD #1  • Home aspirin on hold prior to surgery  • Discussed plan with RN    DVT prophylaxis:  Medical and mechanical DVT prophylaxis orders are present.  CODE STATUS:         Electronically signed by DASHA Lincoln, 02/13/23, 3:40 PM EST.        Complex Repair And Double M Plasty Text: The defect edges were debeveled with a #15 scalpel blade.  The primary defect was closed partially with a complex linear closure.  Given the location of the remaining defect, shape of the defect and the proximity to free margins a double M plasty was deemed most appropriate for complete closure of the defect.  Using a sterile surgical marker, an appropriate advancement flap was drawn incorporating the defect and placing the expected incisions within the relaxed skin tension lines where possible.    The area thus outlined was incised deep to adipose tissue with a #15 scalpel blade.  The skin margins were undermined to an appropriate distance in all directions utilizing iris scissors.

## 2023-09-07 RX ORDER — ASPIRIN 81 MG/1
81 TABLET ORAL DAILY
Qty: 90 TABLET | Refills: 3 | Status: SHIPPED | OUTPATIENT
Start: 2023-09-07

## 2023-10-26 ENCOUNTER — OFFICE VISIT (OUTPATIENT)
Dept: ORTHOPEDIC SURGERY | Facility: CLINIC | Age: 73
End: 2023-10-26
Payer: MEDICARE

## 2023-10-26 VITALS
HEART RATE: 57 BPM | BODY MASS INDEX: 35.92 KG/M2 | SYSTOLIC BLOOD PRESSURE: 136 MMHG | OXYGEN SATURATION: 95 % | DIASTOLIC BLOOD PRESSURE: 74 MMHG | HEIGHT: 69 IN | WEIGHT: 242.51 LBS

## 2023-10-26 DIAGNOSIS — M76.31 IT BAND SYNDROME, RIGHT: ICD-10-CM

## 2023-10-26 DIAGNOSIS — T14.8XXA MUSCLE STRAIN: ICD-10-CM

## 2023-10-26 DIAGNOSIS — M25.561 RIGHT KNEE PAIN, UNSPECIFIED CHRONICITY: Primary | ICD-10-CM

## 2023-10-26 NOTE — PROGRESS NOTES
"Chief Complaint  Follow-up of the Right Knee    Subjective      Percy Velarde presents to [facility] for follow up of his right knee.  Patient is 8-1/2 months status post right total knee arthroplasty performed by Dr. Vigil on 2/13/2023. He states a few days ago he was helping his nephew lift an oven and has had a \"sensation\" in his knee. He said he has pain above his knee cap/along the front/ and it radiates up to the hip. He doesn't have groin pain right now. He has some numbness in hs knee but it seems like its getting worse. He previously participated in PT at [facility] in [place] and would like to return.     No Known Allergies    Objective     Vital Signs:   Vitals:    10/26/23 0812   BP: 136/74   Pulse: 57   SpO2: 95%   Weight: 110 kg (242 lb 8.1 oz)   Height: 175.3 cm (69\")     Body mass index is 35.81 kg/m².    I reviewed the patient's chief complaint, history of present illness, review of systems, past medical history, surgical history, family history, social history, medications, and allergy list.     REVIEW OF SYSTEMS    Constitutional: Denies fevers, chills, weight loss  Cardiovascular: Denies chest pain, shortness of breath  Skin: Denies rashes, acute skin changes  Neurologic: Denies headache, loss of consciousness  MSK: Right knee pain.     Ortho Exam  Right lower extremity: Skin is warm, dry, and intact.  Well-healed surgical scar noted.  Minimal edema.  Patella is well tracking and knee is stable to varus and valgus stress.  Full knee extension and flexion to 120 degrees.  Full plantarflexion and dorsiflexion of the ankle.  Sensation intact light touch.  Distal neurovascular intact.             Assessment and Plan   Diagnoses and all orders for this visit:    1. Right knee pain, unspecified chronicity (Primary)  -     Diclofenac Sodium (VOLTAREN) 1 % gel gel; Apply 4 g topically to the appropriate area as directed 4 (Four) Times a Day As Needed (pain).  Dispense: 100 g; " Refill: 1  -     diclofenac (VOLTAREN) 50 MG EC tablet; Take 1 tablet by mouth 2 (Two) Times a Day.  Dispense: 60 tablet; Refill: 0  -     Ambulatory Referral to Physical Therapy Evaluate and treat; Strengthening, ROM, Stretching    2. It band syndrome, right  -     Diclofenac Sodium (VOLTAREN) 1 % gel gel; Apply 4 g topically to the appropriate area as directed 4 (Four) Times a Day As Needed (pain).  Dispense: 100 g; Refill: 1  -     diclofenac (VOLTAREN) 50 MG EC tablet; Take 1 tablet by mouth 2 (Two) Times a Day.  Dispense: 60 tablet; Refill: 0  -     Ambulatory Referral to Physical Therapy Evaluate and treat; Strengthening, ROM, Stretching    3. Muscle strain      Follow Up   Return in about 6 weeks (around 12/7/2023).  Patient Instructions   Patient localizing pain to the lateral aspect of his knee with radiation up to his thigh and lower lateral calf.  Pain does appear to be more muscular in nature and possible IT band component.  Did discuss options with patient, including return to physical therapy, participation in home exercise program, trial of NSAIDs.  Patient is agreeable to return to physical therapy and updated order placed.  Advised to continue participation in home exercise program.  Continue icing and elevation of the knee as needed for pain or swelling.  Prescription for diclofenac tablets and gel sent to pharmacy.  Do not take diclofenac with other NSAIDs.    Follow-up in 6 weeks.  Repeat x-rays needed.  Call with changes or concerns.  Patient was given instructions and counseling regarding his condition or for health maintenance advice. Please see specific information pulled into the AVS if appropriate.

## 2023-10-27 NOTE — PATIENT INSTRUCTIONS
Patient localizing pain to the lateral aspect of his knee with radiation up to his thigh and lower lateral calf.  Pain does appear to be more muscular in nature and possible IT band component.  Did discuss options with patient, including return to physical therapy, participation in home exercise program, trial of NSAIDs.  Patient is agreeable to return to physical therapy and updated order placed.  Advised to continue participation in home exercise program.  Continue icing and elevation of the knee as needed for pain or swelling.  Prescription for diclofenac tablets and gel sent to pharmacy.  Do not take diclofenac with other NSAIDs.    Follow-up in 6 weeks.  Repeat x-rays needed.  Call with changes or concerns.

## 2023-12-08 ENCOUNTER — OFFICE VISIT (OUTPATIENT)
Dept: ORTHOPEDIC SURGERY | Facility: CLINIC | Age: 73
End: 2023-12-08
Payer: MEDICARE

## 2023-12-08 VITALS — HEIGHT: 69 IN | BODY MASS INDEX: 35.84 KG/M2 | WEIGHT: 242 LBS

## 2023-12-08 DIAGNOSIS — Z47.1 AFTERCARE FOLLOWING RIGHT KNEE JOINT REPLACEMENT SURGERY: ICD-10-CM

## 2023-12-08 DIAGNOSIS — M25.561 RIGHT KNEE PAIN, UNSPECIFIED CHRONICITY: Primary | ICD-10-CM

## 2023-12-08 DIAGNOSIS — Z96.651 AFTERCARE FOLLOWING RIGHT KNEE JOINT REPLACEMENT SURGERY: ICD-10-CM

## 2023-12-08 PROCEDURE — 1160F RVW MEDS BY RX/DR IN RCRD: CPT | Performed by: PHYSICIAN ASSISTANT

## 2023-12-08 PROCEDURE — 99213 OFFICE O/P EST LOW 20 MIN: CPT | Performed by: PHYSICIAN ASSISTANT

## 2023-12-08 PROCEDURE — 1159F MED LIST DOCD IN RCRD: CPT | Performed by: PHYSICIAN ASSISTANT

## 2023-12-08 NOTE — PATIENT INSTRUCTIONS
Patient is doing very well following return to PT and has been discharged. Advised to continue home exercises. X-rays reviewed, showing hardware intact. Continue home exercise program to progress strength and ROM.    Continue with lifelong antibiotic prophylaxis with dental procedures following total joint replacement.    Follow-up in 1 year. Call sooner or return to care, if needed with any changes or concerns. Repeat x-rays.

## 2023-12-08 NOTE — PROGRESS NOTES
"Chief Complaint  Follow-up and Pain of the Right Knee    Subjective      Percy Velarde presents to [facility] for follow-up of right knee.  He has a history of right total knee arthroplasty performed on 2/13/2023 by Dr. Vigil.  He was recently seen in office on 10/26/2023 after recent injury while lifting a event.  He received referral to physical therapy.  Patient presents independently ambulatory without use of assistive device.  Reports that his knee is \"getting better\".  He participated in physical therapy at [facility] in [place] and reports he has been discharged within the last 2 weeks due to completion of goals.  Reports he is noted significant improvement in ROM and that it is easier to get shoes and socks on/off.    Objective   No Known Allergies    Vital Signs:   Ht 175.3 cm (69\")   Wt 110 kg (242 lb)   BMI 35.74 kg/m²       Physical Exam    Constitutional: Awake, alert. Well nourished appearance.    Integumentary: Warm, dry, intact. No obvious rashes.    HENT: Atraumatic, normocephalic.   Respiratory: Non labored respirations .   Cardiovascular: Intact peripheral pulses.    Psychiatric: Normal mood and affect. A&O X3    Ortho Exam  Right knee: Skin is warm, dry, and intact.  Well-healed surgical scar noted.  No edema.  Patella is well tracking and knee is stable to varus and valgus stress.  Full knee extension and flexion to 120 degrees.  Full plantarflexion and dorsiflexion of the ankle.  Sensation intact light touch.  Distal neurovascular intact.    Imaging Results (Most Recent)       Procedure Component Value Units Date/Time    XR Knee 3 View Right [[id number]] Resulted: 12/11/23 0902     Updated: 12/11/23 0903    Narrative:      X-Ray Report:  Study: X-rays ordered, taken in the office, and reviewed today.   Site: Right knee Xray  Indication: TKA  View: AP/Lateral, Standing, and [place] view(s)  Findings: Intact right total knee arthroplasty. No evidence of hardware "   malfunction.   Prior studies available for comparison: yes                       Assessment and Plan   Problem List Items Addressed This Visit    None  Visit Diagnoses       Right knee pain, unspecified chronicity    -  Primary    Relevant Orders    XR Knee 3 View Right (Completed)    Aftercare following right knee joint replacement surgery                Follow Up   Return in about 1 year (around 12/8/2024).    Tobacco Use: Medium Risk (12/8/2023)    Patient History     Smoking Tobacco Use: Former     Smokeless Tobacco Use: Former     Passive Exposure: Past     Patient is a former smoker.  Encouraged continued tobacco cessation.  Did not discuss options for smoking cessation.    Patient Instructions   Patient is doing very well following return to PT and has been discharged. Advised to continue home exercises. X-rays reviewed, showing hardware intact. Continue home exercise program to progress strength and ROM.    Continue with lifelong antibiotic prophylaxis with dental procedures following total joint replacement.    Follow-up in 1 year. Call sooner or return to care, if needed with any changes or concerns. Repeat x-rays.     Patient was given instructions and counseling regarding his condition or for health maintenance advice. Please see specific information pulled into the AVS if appropriate.

## 2024-03-22 DIAGNOSIS — M17.0 BILATERAL PRIMARY OSTEOARTHRITIS OF KNEE: ICD-10-CM

## 2024-03-22 RX ORDER — MELOXICAM 15 MG/1
TABLET ORAL
Qty: 30 TABLET | Refills: 1 | OUTPATIENT
Start: 2024-03-22

## 2024-04-11 ENCOUNTER — LAB (OUTPATIENT)
Dept: LAB | Facility: HOSPITAL | Age: 74
End: 2024-04-11
Payer: MEDICARE

## 2024-04-11 DIAGNOSIS — E78.2 MIXED HYPERLIPIDEMIA: ICD-10-CM

## 2024-04-11 DIAGNOSIS — I10 HYPERTENSION, ESSENTIAL: ICD-10-CM

## 2024-04-11 LAB
ALBUMIN SERPL-MCNC: 3.9 G/DL (ref 3.5–5.2)
ALBUMIN/GLOB SERPL: 1.4 G/DL
ALP SERPL-CCNC: 69 U/L (ref 39–117)
ALT SERPL W P-5'-P-CCNC: 20 U/L (ref 1–41)
ANION GAP SERPL CALCULATED.3IONS-SCNC: 10 MMOL/L (ref 5–15)
AST SERPL-CCNC: 28 U/L (ref 1–40)
BILIRUB SERPL-MCNC: 0.8 MG/DL (ref 0–1.2)
BUN SERPL-MCNC: 16 MG/DL (ref 8–23)
BUN/CREAT SERPL: 11.5 (ref 7–25)
CALCIUM SPEC-SCNC: 9 MG/DL (ref 8.6–10.5)
CHLORIDE SERPL-SCNC: 107 MMOL/L (ref 98–107)
CHOLEST SERPL-MCNC: 105 MG/DL (ref 0–200)
CO2 SERPL-SCNC: 22 MMOL/L (ref 22–29)
CREAT SERPL-MCNC: 1.39 MG/DL (ref 0.76–1.27)
EGFRCR SERPLBLD CKD-EPI 2021: 52.5 ML/MIN/1.73
GLOBULIN UR ELPH-MCNC: 2.8 GM/DL
GLUCOSE SERPL-MCNC: 92 MG/DL (ref 65–99)
HDLC SERPL-MCNC: 57 MG/DL (ref 40–60)
LDLC SERPL CALC-MCNC: 33 MG/DL (ref 0–100)
LDLC/HDLC SERPL: 0.59 {RATIO}
POTASSIUM SERPL-SCNC: 4 MMOL/L (ref 3.5–5.2)
PROT SERPL-MCNC: 6.7 G/DL (ref 6–8.5)
SODIUM SERPL-SCNC: 139 MMOL/L (ref 136–145)
TRIGL SERPL-MCNC: 71 MG/DL (ref 0–150)
VLDLC SERPL-MCNC: 15 MG/DL (ref 5–40)

## 2024-04-11 PROCEDURE — 36415 COLL VENOUS BLD VENIPUNCTURE: CPT

## 2024-04-11 PROCEDURE — 80061 LIPID PANEL: CPT

## 2024-04-11 PROCEDURE — 80053 COMPREHEN METABOLIC PANEL: CPT

## 2024-04-17 ENCOUNTER — OFFICE VISIT (OUTPATIENT)
Dept: CARDIOLOGY | Facility: CLINIC | Age: 74
End: 2024-04-17
Payer: MEDICARE

## 2024-04-17 VITALS
WEIGHT: 244 LBS | BODY MASS INDEX: 36.14 KG/M2 | HEART RATE: 56 BPM | DIASTOLIC BLOOD PRESSURE: 75 MMHG | SYSTOLIC BLOOD PRESSURE: 149 MMHG | HEIGHT: 69 IN

## 2024-04-17 DIAGNOSIS — I49.5 SICK SINUS SYNDROME: ICD-10-CM

## 2024-04-17 DIAGNOSIS — I25.10 CORONARY ARTERY DISEASE INVOLVING NATIVE CORONARY ARTERY OF NATIVE HEART WITHOUT ANGINA PECTORIS: Primary | ICD-10-CM

## 2024-04-17 DIAGNOSIS — I10 HYPERTENSION, ESSENTIAL: Chronic | ICD-10-CM

## 2024-04-17 PROCEDURE — 3078F DIAST BP <80 MM HG: CPT | Performed by: INTERNAL MEDICINE

## 2024-04-17 PROCEDURE — 99214 OFFICE O/P EST MOD 30 MIN: CPT | Performed by: INTERNAL MEDICINE

## 2024-04-17 PROCEDURE — 3077F SYST BP >= 140 MM HG: CPT | Performed by: INTERNAL MEDICINE

## 2024-04-17 RX ORDER — MELOXICAM 15 MG/1
1 TABLET ORAL DAILY
COMMUNITY
Start: 2024-02-28

## 2024-04-17 NOTE — PROGRESS NOTES
Chief Complaint  Follow-up    Subjective    Patient without any new complaints still with generalized fatigue.  He is also had persistent lower extremity edema problems.  No anginal chest pain  Past Medical History:   Diagnosis Date    Anemia     Arthritis     COPD (chronic obstructive pulmonary disease)     Coronary artery disease involving native heart without angina pectoris 11/24/2021    Follows with Dr. Singh MORSE (Eustachian tube dysfunction), bilateral     GERD (gastroesophageal reflux disease)     Gout     no issues for several years    H/O arthroscopy of shoulder 09/28/2017    aftercare following, right     Hearing loss     History of arthroscopy of right shoulder 01/11/2018    Hyperlipemia     Hypertension, essential     Hypothyroidism     Limb swelling     feet ankles    Lipoma     removed    Low back pain     Melanoma     left arm removed    Nasal septal ulcer     Otalgia     Personal history of colonic polyps 07/14/2014    Recurrent epistaxis     no current issues    Rotator cuff tear arthropathy of right shoulder 08/01/2017    repaired    S/P TKR (total knee replacement), right     2/13/2023    Seasonal allergies     Sleep apnea     does not use CPAP    Tinnitus, bilateral          Current Outpatient Medications:     allopurinol (ZYLOPRIM) 100 MG tablet, Take 1 tablet by mouth Every Night., Disp: , Rfl:     amLODIPine (NORVASC) 2.5 MG tablet, Take 1 tablet by mouth Daily., Disp: , Rfl:     ascorbic acid (VITAMIN C) 500 MG capsule controlled-release CR capsule, Take 1 capsule by mouth Daily., Disp: , Rfl:     aspirin (Aspirin Low Dose) 81 MG EC tablet, Take 1 tablet by mouth Daily., Disp: 90 tablet, Rfl: 3    atorvastatin (LIPITOR) 40 MG tablet, Take 1 tablet by mouth Daily. (Patient taking differently: Take 1 tablet by mouth Every Night.), Disp: 90 tablet, Rfl: 3    Diclofenac Sodium (VOLTAREN) 1 % gel gel, Apply 4 g topically to the appropriate area as directed 4 (Four) Times a Day As Needed  (pain)., Disp: 100 g, Rfl: 1    doxazosin (CARDURA) 2 MG tablet, TAKE 1 TABLET BY MOUTH EVERY NIGHT AT BEDTIME for htn, Disp: , Rfl:     fexofenadine (ALLEGRA) 180 MG tablet, Take 1 tablet by mouth Daily., Disp: , Rfl:     fluticasone (FLONASE) 50 MCG/ACT nasal spray, 1 spray into the nostril(s) as directed by provider Every Night., Disp: , Rfl:     levothyroxine (SYNTHROID, LEVOTHROID) 137 MCG tablet, Take 1 tablet by mouth Daily. Every Friday and Monday take 1.5, Disp: , Rfl:     losartan (COZAAR) 50 MG tablet, TAKE 1 TABLET BY MOUTH TWICE DAILY, Disp: 180 tablet, Rfl: 3    meloxicam (MOBIC) 15 MG tablet, Take 1 tablet by mouth Daily., Disp: , Rfl:     montelukast (SINGULAIR) 10 MG tablet, Take 1 tablet by mouth Every Night., Disp: , Rfl:     pantoprazole (PROTONIX) 40 MG EC tablet, Take 1 tablet by mouth Daily., Disp: , Rfl:     spironolactone-hydrochlorothiazide (ALDACTAZIDE) 25-25 MG tablet, Take 1 tablet by mouth Every Other Day., Disp: 45 tablet, Rfl: 3    traZODone (DESYREL) 50 MG tablet, Take 1 tablet by mouth Every Night., Disp: , Rfl:     Medications Discontinued During This Encounter   Medication Reason    diclofenac (VOLTAREN) 50 MG EC tablet *Therapy completed     No Known Allergies     Social History     Tobacco Use    Smoking status: Former     Current packs/day: 0.00     Average packs/day: 0.5 packs/day for 12.0 years (6.0 ttl pk-yrs)     Types: Cigarettes     Start date:      Quit date:      Years since quittin.3     Passive exposure: Past    Smokeless tobacco: Former     Types: Chew     Quit date:     Tobacco comments:     QUIT 1975   Vaping Use    Vaping status: Never Used   Substance Use Topics    Alcohol use: Yes     Alcohol/week: 3.0 standard drinks of alcohol     Types: 2 Cans of beer, 1 Shots of liquor per week     Comment: occ    Drug use: Never       Family History   Problem Relation Age of Onset    Heart disease Father     Heart attack Father 57        MI    Diabetes  "Brother     Colon cancer Other     Malig Hyperthermia Neg Hx         Objective     /75   Pulse 56   Ht 175.3 cm (69\")   Wt 111 kg (244 lb)   BMI 36.03 kg/m²       Physical Exam    General Appearance:   no acute distress  Alert and oriented x3  HENT:   lips not cyanotic  Atraumatic  Neck:  No jvd   supple  Respiratory:  no respiratory distress  normal breath sounds  no rales  Cardiovascular:  Regular rate and rhythm  no S3, no S4   2/6 systolic murmur  no rub  Extremities  No cyanosis  lower extremity edema: none    Skin:   warm, dry  No rashes      Result Review :     proBNP   Date Value Ref Range Status   07/18/2022 7.9 0.0 - 900.0 pg/mL Final     CMP          4/11/2024    10:15   CMP   Glucose 92    BUN 16    Creatinine 1.39    EGFR 52.5    Sodium 139    Potassium 4.0    Chloride 107    Calcium 9.0    Total Protein 6.7    Albumin 3.9    Globulin 2.8    Total Bilirubin 0.8    Alkaline Phosphatase 69    AST (SGOT) 28    ALT (SGPT) 20    Albumin/Globulin Ratio 1.4    BUN/Creatinine Ratio 11.5    Anion Gap 10.0         Lab Results   Component Value Date    TSH 6.740 (H) 11/18/2021      Lab Results   Component Value Date    FREET4 1.08 11/18/2021      No results found for: \"DDIMERQUANT\"  Magnesium   Date Value Ref Range Status   07/18/2022 2.2 1.6 - 2.4 mg/dL Final      No results found for: \"DIGOXIN\"   No results found for: \"TROPONINT\"        Lipid Panel          4/11/2024    10:15   Lipid Panel   Total Cholesterol 105    Triglycerides 71    HDL Cholesterol 57    VLDL Cholesterol 15    LDL Cholesterol  33    LDL/HDL Ratio 0.59      No results found for: \"POCTROP\"    Results for orders placed in visit on 07/29/22    Adult Transthoracic Echo Complete W/ Cont if Necessary Per Protocol    Interpretation Summary  · Estimated left ventricular EF was in agreement with the calculated left ventricular EF. Left ventricular ejection fraction appears to be 61 - 65%. Left ventricular systolic function is normal.  · Left " ventricular diastolic function is consistent with (grade Ia w/high LAP) impaired relaxation.  · No significant valvular abnormality is noted.                 Diagnoses and all orders for this visit:    1. Coronary artery disease involving native coronary artery of native heart without angina pectoris (Primary)  Assessment & Plan:  Patient is doing well no ongoing angina continue with chronic aspirin 81 mg daily        2. Sick sinus syndrome  Assessment & Plan:  Patient with some symptomatic fatigability but no other overt symptoms heart rate in the resting the 50s will repeat EKG on next visit      3. Hypertension, essential  Assessment & Plan:  Blood pressure well-controlled continue on amlodipine 2.5 mg once a day and spironolactone HCTZ 25/25 daily               Follow Up     Return in about 6 months (around 10/17/2024) for EKG with F/U, Follow with Elida Vance.          Patient was given instructions and counseling regarding his condition or for health maintenance advice. Please see specific information pulled into the AVS if appropriate.

## 2024-04-17 NOTE — ASSESSMENT & PLAN NOTE
Patient with some symptomatic fatigability but no other overt symptoms heart rate in the resting the 50s will repeat EKG on next visit

## 2024-04-17 NOTE — ASSESSMENT & PLAN NOTE
Blood pressure well-controlled continue on amlodipine 2.5 mg once a day and spironolactone HCTZ 25/25 daily

## 2024-10-17 ENCOUNTER — OFFICE VISIT (OUTPATIENT)
Dept: CARDIOLOGY | Facility: CLINIC | Age: 74
End: 2024-10-17
Payer: MEDICARE

## 2024-10-17 VITALS
HEIGHT: 69 IN | SYSTOLIC BLOOD PRESSURE: 126 MMHG | DIASTOLIC BLOOD PRESSURE: 67 MMHG | BODY MASS INDEX: 36.85 KG/M2 | HEART RATE: 62 BPM | WEIGHT: 248.8 LBS

## 2024-10-17 DIAGNOSIS — E78.2 MIXED HYPERLIPIDEMIA: Chronic | ICD-10-CM

## 2024-10-17 DIAGNOSIS — I10 HYPERTENSION, ESSENTIAL: Chronic | ICD-10-CM

## 2024-10-17 DIAGNOSIS — I49.5 SICK SINUS SYNDROME: ICD-10-CM

## 2024-10-17 DIAGNOSIS — I25.10 CORONARY ARTERY DISEASE INVOLVING NATIVE CORONARY ARTERY OF NATIVE HEART WITHOUT ANGINA PECTORIS: Primary | ICD-10-CM

## 2024-10-17 PROBLEM — M19.90 ARTHRITIS: Status: ACTIVE | Noted: 2024-10-17

## 2024-10-17 PROBLEM — D17.9 LIPOMA: Status: ACTIVE | Noted: 2024-10-17

## 2024-10-17 PROBLEM — J30.2 SEASONAL ALLERGIC RHINITIS: Status: ACTIVE | Noted: 2024-10-17

## 2024-10-17 PROBLEM — M67.919 DISORDER OF ROTATOR CUFF: Status: ACTIVE | Noted: 2017-08-01

## 2024-10-17 PROBLEM — C43.9 MELANOMA: Status: ACTIVE | Noted: 2024-10-17

## 2024-10-17 PROBLEM — G47.30 SLEEP APNEA: Status: ACTIVE | Noted: 2024-10-17

## 2024-10-17 PROBLEM — M10.9 GOUT: Status: ACTIVE | Noted: 2024-10-17

## 2024-10-17 PROBLEM — H91.90 HEARING LOSS: Status: ACTIVE | Noted: 2024-10-17

## 2024-10-17 PROBLEM — R06.02 SOB (SHORTNESS OF BREATH) ON EXERTION: Status: RESOLVED | Noted: 2022-06-16 | Resolved: 2024-10-17

## 2024-10-17 PROCEDURE — 1160F RVW MEDS BY RX/DR IN RCRD: CPT | Performed by: NURSE PRACTITIONER

## 2024-10-17 PROCEDURE — 99214 OFFICE O/P EST MOD 30 MIN: CPT | Performed by: NURSE PRACTITIONER

## 2024-10-17 PROCEDURE — 93000 ELECTROCARDIOGRAM COMPLETE: CPT | Performed by: NURSE PRACTITIONER

## 2024-10-17 PROCEDURE — 1159F MED LIST DOCD IN RCRD: CPT | Performed by: NURSE PRACTITIONER

## 2024-10-17 PROCEDURE — 3078F DIAST BP <80 MM HG: CPT | Performed by: NURSE PRACTITIONER

## 2024-10-17 PROCEDURE — 3074F SYST BP LT 130 MM HG: CPT | Performed by: NURSE PRACTITIONER

## 2024-10-17 RX ORDER — SPIRONOLACTONE AND HYDROCHLOROTHIAZIDE 25; 25 MG/1; MG/1
1 TABLET ORAL EVERY OTHER DAY
Qty: 45 TABLET | Refills: 3 | Status: SHIPPED | OUTPATIENT
Start: 2024-10-17

## 2024-10-17 RX ORDER — FERROUS SULFATE 325(65) MG
1 TABLET ORAL 2 TIMES DAILY WITH MEALS
COMMUNITY
Start: 2024-10-10

## 2024-10-17 RX ORDER — NITROGLYCERIN 0.4 MG/1
TABLET SUBLINGUAL
Qty: 100 TABLET | Refills: 11 | Status: SHIPPED | OUTPATIENT
Start: 2024-10-17

## 2024-10-17 RX ORDER — ATORVASTATIN CALCIUM 20 MG/1
1 TABLET, FILM COATED ORAL DAILY
COMMUNITY
Start: 2024-09-23

## 2024-10-17 NOTE — PROGRESS NOTES
Chief Complaint  Follow-up, Coronary Artery Disease, Hypertension, and Hyperlipidemia    Subjective            History of Present Illness  Percy Velarde is a 74-year-old male patient who presents to the office today for follow up. He has CAD, sick sinus syndrome, hypertension, and hyperlipidemia. He is compliant with medications. He reports occasional chest discomfort but usually brief in nature and resolves on its own. He denies exertional component to chest discomfort.  He has leg muscle cramps at nighttime.     PMH  Past Medical History:   Diagnosis Date    Anemia     Arthritis     COPD (chronic obstructive pulmonary disease)     Coronary artery disease involving native heart without angina pectoris 11/24/2021    Follows with Dr. Singh MORSE (Eustachian tube dysfunction), bilateral     GERD (gastroesophageal reflux disease)     Gout     no issues for several years    H/O arthroscopy of shoulder 09/28/2017    aftercare following, right     Hearing loss     History of arthroscopy of right shoulder 01/11/2018    Hyperlipemia     Hypertension, essential     Hypothyroidism     Limb swelling     feet ankles    Lipoma     removed    Low back pain     Melanoma     left arm removed    Nasal septal ulcer     Otalgia     Personal history of colonic polyps 07/14/2014    Recurrent epistaxis     no current issues    Rotator cuff tear arthropathy of right shoulder 08/01/2017    repaired    S/P TKR (total knee replacement), right     2/13/2023    Seasonal allergies     Sleep apnea     does not use CPAP    Tinnitus, bilateral          ALLERGY  No Known Allergies       SURGICALHX  Past Surgical History:   Procedure Laterality Date    CHOLECYSTECTOMY      COLONOSCOPY  12/10/2019    DR. RONDON    COLONOSCOPY N/A 02/06/2023    Procedure: COLONOSCOPY WITH COLD SNARE POLYPECTOMY;  Surgeon: Mich Rondon MD;  Location: [facility] ENDOSCOPY;  Service: Gastroenterology;  Laterality: N/A;  COLON POLYP. DIVERTICULOSIS    CYSTOSCOPY  URETEROSCOPY Bilateral 5/10/2023    Procedure: CYSTOSCOPY RETROGRADE PYELOGRAM;  Surgeon: Luke Lagunas MD;  Location: [facility] MAIN OR;  Service: Urology;  Laterality: Bilateral;    ENDOSCOPY      ENDOSCOPY N/A 2021    Procedure: ESOPHAGOGASTRODUODENOSCOPY with biopsy;  Surgeon: Mich Lane MD;  Location: [facility] ENDOSCOPY;  Service: Gastroenterology;  Laterality: N/A;  gastric polyps    LIPOMA EXCISION  2019    removal  neck    MYRINGOTOMY W/ TUBES      states had had several pairs placed by Dr. aKba     OTHER SURGICAL HISTORY Left 2013    melanoma excision from shoulder     ROTATOR CUFF REPAIR Right 2017    TONSILLECTOMY      TOTAL KNEE ARTHROPLASTY Right 2023    Procedure: RIGHT TOTAL KNEE ARTHROPLASTY.;  Surgeon: Bao Vigil MD;  Location: [facility] MAIN OR;  Service: Orthopedics;  Laterality: Right;    UPPER GASTROINTESTINAL ENDOSCOPY            SOC  Social History     Socioeconomic History    Marital status:    Tobacco Use    Smoking status: Former     Current packs/day: 0.00     Average packs/day: 0.5 packs/day for 12.0 years (6.0 ttl pk-yrs)     Types: Cigarettes     Start date:      Quit date:      Years since quittin.8     Passive exposure: Past    Smokeless tobacco: Former     Types: Chew     Quit date:     Tobacco comments:     QUIT    Vaping Use    Vaping status: Never Used   Substance and Sexual Activity    Alcohol use: Yes     Alcohol/week: 3.0 standard drinks of alcohol     Types: 2 Cans of beer, 1 Shots of liquor per week     Comment: occ    Drug use: Never    Sexual activity: Defer         FAMHX  Family History   Problem Relation Age of Onset    Heart disease Father     Heart attack Father 57        MI    Diabetes Brother     Colon cancer Other     Malig Hyperthermia Neg Hx           MEDSIGONLY  Current Outpatient Medications on File Prior to Visit   Medication Sig    allopurinol (ZYLOPRIM) 100 MG tablet Take 1 tablet by mouth Every  "Night.    amLODIPine (NORVASC) 2.5 MG tablet Take 1 tablet by mouth Daily.    ascorbic acid (VITAMIN C) 500 MG capsule controlled-release CR capsule Take 1 capsule by mouth Daily.    aspirin (Aspirin Low Dose) 81 MG EC tablet Take 1 tablet by mouth Daily.    atorvastatin (LIPITOR) 20 MG tablet Take 1 tablet by mouth Daily.    doxazosin (CARDURA) 2 MG tablet TAKE 1 TABLET BY MOUTH EVERY NIGHT AT BEDTIME for htn    FeroSul 325 (65 Fe) MG tablet Take 1 tablet by mouth 2 (Two) Times a Day With Meals.    fexofenadine (ALLEGRA) 180 MG tablet Take 1 tablet by mouth Daily.    fluticasone (FLONASE) 50 MCG/ACT nasal spray 1 spray into the nostril(s) as directed by provider Every Night.    levothyroxine (SYNTHROID, LEVOTHROID) 137 MCG tablet Take 1 tablet by mouth Daily. Every Friday and Monday take 1.5    losartan (COZAAR) 50 MG tablet TAKE 1 TABLET BY MOUTH TWICE DAILY    montelukast (SINGULAIR) 10 MG tablet Take 1 tablet by mouth Every Night.    pantoprazole (PROTONIX) 40 MG EC tablet Take 1 tablet by mouth Daily.    spironolactone-hydrochlorothiazide (ALDACTAZIDE) 25-25 MG tablet Take 1 tablet by mouth Every Other Day.    traZODone (DESYREL) 50 MG tablet Take 1 tablet by mouth Every Night.    [DISCONTINUED] atorvastatin (LIPITOR) 40 MG tablet Take 1 tablet by mouth Daily. (Patient taking differently: Take 0.5 tablets by mouth Every Night.)    [DISCONTINUED] Diclofenac Sodium (VOLTAREN) 1 % gel gel Apply 4 g topically to the appropriate area as directed 4 (Four) Times a Day As Needed (pain). (Patient not taking: Reported on 10/17/2024)    [DISCONTINUED] meloxicam (MOBIC) 15 MG tablet Take 1 tablet by mouth Daily. (Patient not taking: Reported on 10/17/2024)     No current facility-administered medications on file prior to visit.         Objective   /67   Pulse 62   Ht 175.3 cm (69.02\")   Wt 113 kg (248 lb 12.8 oz)   BMI 36.72 kg/m²       Physical Exam  Constitutional:       Appearance: He is normal weight.   HENT: " "     Head: Normocephalic.   Neck:      Vascular: No carotid bruit.   Cardiovascular:      Rate and Rhythm: Normal rate and regular rhythm.      Pulses: Normal pulses.      Heart sounds: Normal heart sounds. No murmur heard.  Pulmonary:      Effort: Pulmonary effort is normal.      Breath sounds: Normal breath sounds.   Musculoskeletal:      Cervical back: Neck supple.      Right lower leg: No edema.      Left lower leg: No edema.   Skin:     General: Skin is dry.   Neurological:      Mental Status: He is alert and oriented to person, place, and time.   Psychiatric:         Behavior: Behavior normal.       ECG 12 Lead    Date/Time: 10/17/2024 7:03 AM  Performed by: Elida Vance APRN    Authorized by: Elida Vance APRN  Comparison: compared with previous ECG from 2/7/2023  Similar to previous ECG  Rhythm: sinus rhythm  Rate: normal  BPM: 62  Conduction: right bundle branch block  ST Segments: ST segments normal  T Waves: T waves normal  QRS axis: normal  Other: no other findings    Clinical impression: abnormal EKG      Result Review :   The following data was reviewed by: PREMA Carcamo on 10/17/2024:  proBNP   Date Value Ref Range Status   07/18/2022 7.9 0.0 - 900.0 pg/mL Final     CMP          4/11/2024    10:15   CMP   Glucose 92    BUN 16    Creatinine 1.39    EGFR 52.5    Sodium 139    Potassium 4.0    Chloride 107    Calcium 9.0    Total Protein 6.7    Albumin 3.9    Globulin 2.8    Total Bilirubin 0.8    Alkaline Phosphatase 69    AST (SGOT) 28    ALT (SGPT) 20    Albumin/Globulin Ratio 1.4    BUN/Creatinine Ratio 11.5    Anion Gap 10.0         Lab Results   Component Value Date    TSH 6.740 (H) 11/18/2021      Lab Results   Component Value Date    FREET4 1.08 11/18/2021      No results found for: \"DDIMERQUANT\"  Magnesium   Date Value Ref Range Status   07/18/2022 2.2 1.6 - 2.4 mg/dL Final      No results found for: \"DIGOXIN\"   No results found for: \"TROPONINT\"        Lipid Panel  "         4/11/2024    10:15   Lipid Panel   Total Cholesterol 105    Triglycerides 71    HDL Cholesterol 57    VLDL Cholesterol 15    LDL Cholesterol  33    LDL/HDL Ratio 0.59        Results for orders placed in visit on 07/29/22    Adult Transthoracic Echo Complete W/ Cont if Necessary Per Protocol    Interpretation Summary  · Estimated left ventricular EF was in agreement with the calculated left ventricular EF. Left ventricular ejection fraction appears to be 61 - 65%. Left ventricular systolic function is normal.  · Left ventricular diastolic function is consistent with (grade Ia w/high LAP) impaired relaxation.  · No significant valvular abnormality is noted.           Assessment and Plan    Diagnoses and all orders for this visit:    1. CAD (Primary)  He denies angina, continue aspirin 81 mg daily. He is going to trial over the counter magnesium at nighttime to see if that helps reduce leg cramping.    2. Sick sinus syndrome  EKG shows normal sinus rhythm with controlled rate today, continue to monitor.    3. Hypertension, essential  Currently controlled and without adverse effects from medication, continue amlodipine 2.5 mg daily, losartan 50 mg daily, and aldactazide 25-25 mg every other day. Check BMP to assess renal function and electrolytes.  -     Basic Metabolic Panel; Future    4. Mixed hyperlipidemia  Last lipid panel was 4/11/24 with LDL 33 which is within goal range, continue atorvastatin 20 mg daily. Repeat fasting labs prior to next appointment.  -     Lipid Panel; Future  -     Hepatic Function Panel; Future    Other orders  -     nitroglycerin (NITROSTAT) 0.4 MG SL tablet; 1 under the tongue as needed for angina, may repeat q5mins for up three doses  Dispense: 100 tablet; Refill: 11  -     spironolactone-hydrochlorothiazide (ALDACTAZIDE) 25-25 MG tablet; Take 1 tablet by mouth Every Other Day.  Dispense: 45 tablet; Refill: 3  -     ECG 12 Lead          Follow Up   Return in about 6 months (around  4/17/2025) for Follow up with Dr Gómez.    Patient was given instructions and counseling regarding his condition or for health maintenance advice. Please see specific information pulled into the AVS if appropriate.     Percy Velarde  reports that he quit smoking about 49 years ago. His smoking use included cigarettes. He started smoking about 61 years ago. He has a 6 pack-year smoking history. He has been exposed to tobacco smoke. He quit smokeless tobacco use about 18 years ago.  His smokeless tobacco use included chew.            Elida Vance, APRN  10/17/24  08:18 EDT    Dictated Utilizing Dragon Dictation

## 2024-11-11 RX ORDER — ASPIRIN 81 MG/1
81 TABLET, COATED ORAL DAILY
Qty: 90 TABLET | Refills: 3 | Status: SHIPPED | OUTPATIENT
Start: 2024-11-11

## 2024-12-10 ENCOUNTER — OFFICE VISIT (OUTPATIENT)
Dept: ORTHOPEDIC SURGERY | Facility: CLINIC | Age: 74
End: 2024-12-10
Payer: MEDICARE

## 2024-12-10 VITALS — DIASTOLIC BLOOD PRESSURE: 79 MMHG | SYSTOLIC BLOOD PRESSURE: 134 MMHG | HEART RATE: 51 BPM | OXYGEN SATURATION: 93 %

## 2024-12-10 DIAGNOSIS — Z47.1 AFTERCARE FOLLOWING RIGHT KNEE JOINT REPLACEMENT SURGERY: ICD-10-CM

## 2024-12-10 DIAGNOSIS — Z96.651 AFTERCARE FOLLOWING RIGHT KNEE JOINT REPLACEMENT SURGERY: ICD-10-CM

## 2024-12-10 DIAGNOSIS — Z96.651 HX OF TOTAL KNEE ARTHROPLASTY, RIGHT: ICD-10-CM

## 2024-12-10 DIAGNOSIS — M25.561 RIGHT KNEE PAIN, UNSPECIFIED CHRONICITY: Primary | ICD-10-CM

## 2024-12-10 PROCEDURE — 3075F SYST BP GE 130 - 139MM HG: CPT

## 2024-12-10 PROCEDURE — 99213 OFFICE O/P EST LOW 20 MIN: CPT

## 2024-12-10 PROCEDURE — 3078F DIAST BP <80 MM HG: CPT

## 2024-12-10 NOTE — PROGRESS NOTES
Chief Complaint  Follow-up of the Right Knee    Subjective      Percy Velarde presents to [facility] for follow up of his right knee..  They are 1 year(s) postop right total knee arthroplasty performed by Dr. Vigil on 2/13/2023.  Today independently ambulatory without the use of any assist devices.  Overall patient states that he is doing well.  He denies any falls or injuries to the right knee.  He states that he does not have near the amount of pain that he did prior to surgery.  He still feels an occasional pop and crack within the knee but it is not debilitating.  Overall he is very happy with his knee.    No Known Allergies    Objective     Vital Signs:   Vitals:    12/10/24 0932   BP: 134/79   Pulse: 51   SpO2: 93%   PainSc:   3     There is no height or weight on file to calculate BMI.    I reviewed the patient's chief complaint, history of present illness, review of systems, past medical history, surgical history, family history, social history, medications, and allergy list.     Ortho Exam  Knee   General: Alert, no acute distress.   Right knee:  Knee stable to varus/valgus stress.  Knee extensor mechanism intact.  0 degrees knee extension. Flexion to 125 degrees. Calf soft, non-tender.  Sensation and neurovascularly intact.  Demonstrates active ankle dorsiflexion and plantarflexion.  Palpable pedal pulses.               Imaging Results (Most Recent)       Procedure Component Value Units Date/Time    XR Knee 3 View Right [[id number]] Resulted: 12/10/24 0949     Updated: 12/10/24 0949    Narrative:      X-Ray Report:  Study: X-rays ordered, taken in the office, and reviewed today.   Site: Right knee Xray  Indication: Right total knee arthroplasty follow up.   View: AP/Lateral, Standing, and [place] view(s)  Findings: Intact right total knee arthroplasty. No evidence of hardware   malfunction, complication or loosening. No periprosthetic fractures   visualized. Patella is midline  tracking on sunrise view.   Prior studies available for comparison: yes                    Assessment and Plan   Diagnoses and all orders for this visit:    1. Right knee pain, unspecified chronicity (Primary)  -     XR Knee 3 View Right    2. Aftercare following right knee joint replacement surgery    3. Hx of total knee arthroplasty, right         Percy Velarde is 1 year(s) post-op right total knee arthroplasty performed by Dr Vigil on 2/13/2023. X-rays reviewed, showing hardware intact and no complications.  Patient is doing well. Continue home exercise program to progress strength and ROM.     Discussed the importance of lifelong antibiotic prophylaxis with dental procedures following total joint replacement. In the event of a dental procedure, patient is welcome to contact our office to obtain antibiotics prior to the procedure if their dentist does not provide the prescription.     We also discussed that if a fall/injury were to occur to the affected extremity was advised for patient to obtain x-rays for clarification that no hardware has been compromised or if showing signs of loosening.    Patient verbalized understanding.     Follow-up in 1 year. We will repeat xray's of the prosthetic joint at that time.   Call sooner or return to care, if needed with any changes or concerns.        Tobacco Use: Medium Risk (12/10/2024)    Patient History     Smoking Tobacco Use: Former     Smokeless Tobacco Use: Former     Passive Exposure: Past     Patient reports they have a history of tobacco use; encouraged continued tobacco cessation for further health benefits.           Follow Up   No follow-ups on file.  There are no Patient Instructions on file for this visit.    Patient was given instructions and counseling regarding his condition or for health maintenance advice. Please see specific information pulled into the AVS if appropriate.       Dictated Utilizing Dragon Dictation. Please note that portions of this note  were completed with a voice recognition program. Part of this note may be an electronic transcription/translation of spoken language to printed text using the Dragon Dictation System.

## 2025-02-25 PROBLEM — M19.90 ARTHRITIS: Status: RESOLVED | Noted: 2024-10-17 | Resolved: 2025-02-25

## 2025-02-26 ENCOUNTER — OFFICE VISIT (OUTPATIENT)
Dept: CARDIOLOGY | Facility: CLINIC | Age: 75
End: 2025-02-26
Payer: MEDICARE

## 2025-02-26 VITALS
HEART RATE: 54 BPM | SYSTOLIC BLOOD PRESSURE: 132 MMHG | HEIGHT: 69 IN | DIASTOLIC BLOOD PRESSURE: 73 MMHG | BODY MASS INDEX: 36.81 KG/M2 | WEIGHT: 248.5 LBS

## 2025-02-26 DIAGNOSIS — I25.10 CORONARY ARTERY DISEASE INVOLVING NATIVE CORONARY ARTERY OF NATIVE HEART WITHOUT ANGINA PECTORIS: Primary | ICD-10-CM

## 2025-02-26 DIAGNOSIS — I49.5 SICK SINUS SYNDROME: ICD-10-CM

## 2025-02-26 PROCEDURE — 99213 OFFICE O/P EST LOW 20 MIN: CPT | Performed by: NURSE PRACTITIONER

## 2025-02-26 PROCEDURE — 3078F DIAST BP <80 MM HG: CPT | Performed by: NURSE PRACTITIONER

## 2025-02-26 PROCEDURE — 93000 ELECTROCARDIOGRAM COMPLETE: CPT | Performed by: NURSE PRACTITIONER

## 2025-02-26 PROCEDURE — 3075F SYST BP GE 130 - 139MM HG: CPT | Performed by: NURSE PRACTITIONER

## 2025-02-26 RX ORDER — MELOXICAM 15 MG/1
7.5 TABLET ORAL EVERY OTHER DAY
COMMUNITY

## 2025-02-26 RX ORDER — DOXAZOSIN 2 MG/1
2 TABLET ORAL NIGHTLY
COMMUNITY
Start: 2025-01-07

## 2025-02-26 NOTE — PROGRESS NOTES
Chief Complaint  Follow-up, Hyperlipidemia, Hypertension, and Coronary Artery Disease    Subjective            History of Present Illness  Percy Velarde is a 75-year-old male patient who presents to the office today for complaint of atypical chest discomfort and palpitations.    History of Present Illness  The patient presents for evaluation of atypical chest discomfort.    He reports experiencing intermittent, atypical chest discomfort, which he first noticed approximately 9 months to a year ago. The discomfort is not constant and is not currently present during this visit. He describes the sensation as faint, neither sharp nor painful, and does not experience any associated squeezing or pressure. There is no associated respiratory distress. The frequency of these episodes is variable, occurring once or twice every other day. He does not report any palpitations or fluttering sensations in the chest. These episodes are brief, lasting less than a minute, and resolve spontaneously without any intervention. He has not undergone stress testing recently. He has previously worn a Holter monitor on two occasions, but not within the past 6 years. He recalls that Dr. Ochoa suggested the possibility of a pacemaker implantation about 15 years ago, but this topic has not been revisited since.    Supplemental Information  He used to walk about 3 miles 3 times a week with his wife, but she passed away 6 years ago. Since then, he had his knee replaced, which makes it hard to walk because he is unsure about the stability of his knee.    FAMILY HISTORY  His father and three of his uncles  in their 50s with heart problems. One uncle lived to be 72, which was the oldest age reached among them.        [facility]  Past Medical History:   Diagnosis Date    Anemia     Arthritis     COPD (chronic obstructive pulmonary disease)     Coronary artery disease involving native heart without angina pectoris 2021    Follows with Dr. Winters     ETD (Eustachian tube dysfunction), bilateral     GERD (gastroesophageal reflux disease)     Gout     no issues for several years    H/O arthroscopy of shoulder 09/28/2017    aftercare following, right     Hearing loss     History of arthroscopy of right shoulder 01/11/2018    Hyperlipemia     Hypertension, essential     Hypothyroidism     Limb swelling     feet ankles    Lipoma     removed    Low back pain     Melanoma     left arm removed    Nasal septal ulcer     Otalgia     Personal history of colonic polyps 07/14/2014    Recurrent epistaxis     no current issues    Rotator cuff tear arthropathy of right shoulder 08/01/2017    repaired    S/P TKR (total knee replacement), right     2/13/2023    Seasonal allergies     Sleep apnea     does not use CPAP    Tinnitus, bilateral          ALLERGY  No Known Allergies       SURGICALHX  Past Surgical History:   Procedure Laterality Date    CHOLECYSTECTOMY      COLONOSCOPY  12/10/2019    DR. LANE    COLONOSCOPY N/A 02/06/2023    Procedure: COLONOSCOPY WITH COLD SNARE POLYPECTOMY;  Surgeon: Mich Lane MD;  Location: [facility] ENDOSCOPY;  Service: Gastroenterology;  Laterality: N/A;  COLON POLYP. DIVERTICULOSIS    CYSTOSCOPY URETEROSCOPY Bilateral 5/10/2023    Procedure: CYSTOSCOPY RETROGRADE PYELOGRAM;  Surgeon: Luke Lagunas MD;  Location: [facility] MAIN OR;  Service: Urology;  Laterality: Bilateral;    ENDOSCOPY      ENDOSCOPY N/A 09/20/2021    Procedure: ESOPHAGOGASTRODUODENOSCOPY with biopsy;  Surgeon: Mich Lane MD;  Location: [facility] ENDOSCOPY;  Service: Gastroenterology;  Laterality: N/A;  gastric polyps    LIPOMA EXCISION  02/26/2019    removal  neck    MYRINGOTOMY W/ TUBES      states had had several pairs placed by Dr. Kaba     OTHER SURGICAL HISTORY Left 2013    melanoma excision from shoulder     ROTATOR CUFF REPAIR Right 2017    TONSILLECTOMY      TOTAL KNEE ARTHROPLASTY Right 2/13/2023    Procedure: RIGHT TOTAL KNEE ARTHROPLASTY.;   Surgeon: Bao Vigil MD;  Location: [facility] MAIN OR;  Service: Orthopedics;  Laterality: Right;    UPPER GASTROINTESTINAL ENDOSCOPY            SOC  Social History     Socioeconomic History    Marital status:    Tobacco Use    Smoking status: Former     Current packs/day: 0.00     Average packs/day: 0.5 packs/day for 12.0 years (6.0 ttl pk-yrs)     Types: Cigarettes     Start date:      Quit date:      Years since quittin.1     Passive exposure: Past    Smokeless tobacco: Former     Types: Chew     Quit date:     Tobacco comments:     QUIT    Vaping Use    Vaping status: Never Used   Substance and Sexual Activity    Alcohol use: Yes     Alcohol/week: 3.0 standard drinks of alcohol     Types: 2 Cans of beer, 1 Shots of liquor per week     Comment: occ    Drug use: Never    Sexual activity: Defer         FAMHX  Family History   Problem Relation Age of Onset    Heart disease Father     Heart attack Father 57        MI    Diabetes Brother     Colon cancer Other     Malig Hyperthermia Neg Hx           MEDSIGONLY  Current Outpatient Medications on File Prior to Visit   Medication Sig    allopurinol (ZYLOPRIM) 100 MG tablet Take 1 tablet by mouth Every Night.    amLODIPine (NORVASC) 2.5 MG tablet Take 1 tablet by mouth Daily.    ascorbic acid (VITAMIN C) 500 MG capsule controlled-release CR capsule Take 1 capsule by mouth Daily.    atorvastatin (LIPITOR) 20 MG tablet Take 1 tablet by mouth Daily.    doxazosin (CARDURA) 2 MG tablet Take 1 tablet by mouth Every Night.    FeroSul 325 (65 Fe) MG tablet Take 1 tablet by mouth 2 (Two) Times a Day With Meals. (Patient taking differently: Take 1 tablet by mouth 2 (Two) Times a Day With Meals. 1 EVERY OTHER DAY)    fexofenadine (ALLEGRA) 180 MG tablet Take 1 tablet by mouth Daily.    fluticasone (FLONASE) 50 MCG/ACT nasal spray Administer 1 spray into the nostril(s) as directed by provider Every Night.    levothyroxine (SYNTHROID, LEVOTHROID) 137 MCG  "tablet Take 1 tablet by mouth Daily. Every Friday and Monday take 1.5    losartan (COZAAR) 50 MG tablet TAKE 1 TABLET BY MOUTH TWICE DAILY    meloxicam (MOBIC) 15 MG tablet Take 0.5 tablets by mouth Every Other Day.    montelukast (SINGULAIR) 10 MG tablet Take 1 tablet by mouth Every Night.    nitroglycerin (NITROSTAT) 0.4 MG SL tablet 1 under the tongue as needed for angina, may repeat q5mins for up three doses    pantoprazole (PROTONIX) 40 MG EC tablet Take 1 tablet by mouth Daily.    spironolactone-hydrochlorothiazide (ALDACTAZIDE) 25-25 MG tablet Take 1 tablet by mouth Every Other Day.    traZODone (DESYREL) 50 MG tablet Take 1 tablet by mouth Every Night.    Aspirin Low Dose 81 MG EC tablet TAKE 1 TABLET BY MOUTH EVERY DAY (Patient not taking: Reported on 2/26/2025)     No current facility-administered medications on file prior to visit.         Objective   /73   Pulse 54   Ht 175.3 cm (69.02\")   Wt 113 kg (248 lb 8 oz)   BMI 36.68 kg/m²       Physical Exam  Constitutional:       Appearance: He is normal weight.   HENT:      Head: Normocephalic.   Neck:      Vascular: No carotid bruit.   Cardiovascular:      Rate and Rhythm: Regular rhythm. Bradycardia present.      Pulses: Normal pulses.      Heart sounds: Normal heart sounds. No murmur heard.  Pulmonary:      Effort: Pulmonary effort is normal.      Breath sounds: Normal breath sounds.   Musculoskeletal:      Cervical back: Neck supple.      Right lower leg: No edema.      Left lower leg: No edema.   Skin:     General: Skin is dry.   Neurological:      Mental Status: He is alert and oriented to person, place, and time.   Psychiatric:         Behavior: Behavior normal.       ECG 12 Lead    Date/Time: 2/26/2025 8:48 AM  Performed by: Elida Vance APRN    Authorized by: Elida Vance APRN  Comparison: compared with previous ECG from 10/17/2024  Similar to previous ECG  Rhythm: sinus rhythm  Rate: bradycardic  BPM: 56  Conduction: right " "bundle branch block  ST Segments: ST segments normal  T Waves: T waves normal  QRS axis: normal  Other: no other findings    Clinical impression: abnormal EKG        Result Review :   The following data was reviewed by: PREMA Carcamo on 02/26/2025:  proBNP   Date Value Ref Range Status   07/18/2022 7.9 0.0 - 900.0 pg/mL Final     CMP          4/11/2024    10:15   CMP   Glucose 92    BUN 16    Creatinine 1.39    EGFR 52.5    Sodium 139    Potassium 4.0    Chloride 107    Calcium 9.0    Total Protein 6.7    Albumin 3.9    Globulin 2.8    Total Bilirubin 0.8    Alkaline Phosphatase 69    AST (SGOT) 28    ALT (SGPT) 20    Albumin/Globulin Ratio 1.4    BUN/Creatinine Ratio 11.5    Anion Gap 10.0         Lab Results   Component Value Date    TSH 6.740 (H) 11/18/2021      Lab Results   Component Value Date    FREET4 1.08 11/18/2021      No results found for: \"DDIMERQUANT\"  Magnesium   Date Value Ref Range Status   07/18/2022 2.2 1.6 - 2.4 mg/dL Final      No results found for: \"DIGOXIN\"   No results found for: \"TROPONINT\"        Lipid Panel          4/11/2024    10:15   Lipid Panel   Total Cholesterol 105    Triglycerides 71    HDL Cholesterol 57    VLDL Cholesterol 15    LDL Cholesterol  33    LDL/HDL Ratio 0.59        Results for orders placed in visit on 07/29/22    Adult Transthoracic Echo Complete W/ Cont if Necessary Per Protocol    Interpretation Summary  · Estimated left ventricular EF was in agreement with the calculated left ventricular EF. Left ventricular ejection fraction appears to be 61 - 65%. Left ventricular systolic function is normal.  · Left ventricular diastolic function is consistent with (grade Ia w/high LAP) impaired relaxation.  · No significant valvular abnormality is noted.         Assessment and Plan    Diagnoses and all orders for this visit:    1. CAD (Primary)  -     ECG 12 Lead  -     Adult Stress Echo W/ Cont or Stress Agent if Necessary Per Protocol; Future  -     Cardiac Event " Monitor (LOUISE) or Mobile Cardiac Outpatient Telemetry (MCT); Future    2. Sick sinus syndrome  -     Cardiac Event Monitor (LOUISE) or Mobile Cardiac Outpatient Telemetry (MCT); Future        Assessment & Plan  1. Atypical chest discomfort.  He reports intermittent, atypical chest discomfort over the past 9 months to a year, described as a faint sensation rather than pain, pressure, or squeezing. It does not cause difficulty breathing and resolves on its own. His blood pressure is within normal range today, and the EKG shows no significant changes compared to the previous one from October 2024. The EKG indicates a normal sinus rhythm with a right bundle branch block. Previous cardiac ultrasounds have not revealed any structural abnormalities. Given the atypical nature of his symptoms and the fact that it has been over 3 years since his last cardiac evaluation, further testing is warranted. An exercise stress test with potential pharmacologic intervention will be ordered. If he experiences difficulty with the treadmill, the test can be switched to a chemical stress test. Additionally, a 7-day Holter monitor will be arranged to assess for any arrhythmias that may be contributing to his symptoms. Blood work was last done in April 2024, and annual labs are scheduled for April 2025. The most recent blood work results will be requested from Dr. Balbuena to ensure all necessary tests have been included. The current treatment plan will be maintained until the results are available. Once Dr. Gómez reviews the results, any necessary adjustments to the treatment plan will be made.      Follow Up   Return in about 6 months (around 8/26/2025) for Follow up with Dr Gómez.    Patient was given instructions and counseling regarding his condition or for health maintenance advice. Please see specific information pulled into the AVS if appropriate.     Percy Velarde  reports that he quit smoking about 50 years ago. His smoking use included  cigarettes. He started smoking about 62 years ago. He has a 6 pack-year smoking history. He has been exposed to tobacco smoke. He quit smokeless tobacco use about 19 years ago.  His smokeless tobacco use included chew.         Patient or patient representative verbalized consent for the use of Ambient Listening during the visit with  PREMA Carcamo for chart documentation. 2/26/2025  09:08 EST    PREMA Carcamo  02/26/25  08:18 EST    Dictated Utilizing Dragon Dictation

## 2025-03-12 ENCOUNTER — HOSPITAL ENCOUNTER (OUTPATIENT)
Facility: HOSPITAL | Age: 75
Discharge: HOME | End: 2025-03-12
Admitting: NURSE PRACTITIONER
Payer: MEDICARE

## 2025-03-12 VITALS — HEIGHT: 69 IN | WEIGHT: 240 LBS | BODY MASS INDEX: 35.55 KG/M2

## 2025-03-12 DIAGNOSIS — I25.10 CORONARY ARTERY DISEASE INVOLVING NATIVE CORONARY ARTERY OF NATIVE HEART WITHOUT ANGINA PECTORIS: ICD-10-CM

## 2025-03-12 PROCEDURE — 93320 DOPPLER ECHO COMPLETE: CPT

## 2025-03-12 PROCEDURE — 93325 DOPPLER ECHO COLOR FLOW MAPG: CPT

## 2025-03-12 PROCEDURE — 93017 CV STRESS TEST TRACING ONLY: CPT

## 2025-03-12 PROCEDURE — 93350 STRESS TTE ONLY: CPT

## 2025-03-13 ENCOUNTER — RESULTS FOLLOW-UP (OUTPATIENT)
Dept: CARDIOLOGY | Facility: CLINIC | Age: 75
End: 2025-03-13
Payer: COMMERCIAL

## 2025-03-17 ENCOUNTER — RESULTS FOLLOW-UP (OUTPATIENT)
Facility: HOSPITAL | Age: 75
End: 2025-03-17
Payer: COMMERCIAL

## 2025-03-17 LAB
AORTIC DIMENSIONLESS INDEX: 0.84 (DI)
ASCENDING AORTA: 3.2 CM
AV MEAN PRESS GRAD SYS DOP V1V2: 8.1 MMHG
AV VMAX SYS DOP: 190 CM/SEC
BH CV ECHO MEAS - ACS: 2.04 CM
BH CV ECHO MEAS - AO MAX PG: 14.4 MMHG
BH CV ECHO MEAS - AO ROOT DIAM: 3.1 CM
BH CV ECHO MEAS - AO V2 VTI: 36.4 CM
BH CV ECHO MEAS - AVA(I,D): 3 CM2
BH CV ECHO MEAS - EDV(CUBED): 70.3 ML
BH CV ECHO MEAS - EDV(MOD-SP2): 104 ML
BH CV ECHO MEAS - EDV(MOD-SP4): 87.9 ML
BH CV ECHO MEAS - EF(MOD-SP2): 61.8 %
BH CV ECHO MEAS - EF(MOD-SP4): 62.1 %
BH CV ECHO MEAS - ESV(CUBED): 18.2 ML
BH CV ECHO MEAS - ESV(MOD-SP2): 39.7 ML
BH CV ECHO MEAS - ESV(MOD-SP4): 33.3 ML
BH CV ECHO MEAS - FS: 36.2 %
BH CV ECHO MEAS - IVS/LVPW: 1.26 CM
BH CV ECHO MEAS - IVSD: 1.26 CM
BH CV ECHO MEAS - LA DIMENSION: 3.2 CM
BH CV ECHO MEAS - LAT PEAK E' VEL: 9 CM/SEC
BH CV ECHO MEAS - LV DIASTOLIC VOL/BSA (35-75): 38.8 CM2
BH CV ECHO MEAS - LV MASS(C)D: 158.1 GRAMS
BH CV ECHO MEAS - LV MAX PG: 10.1 MMHG
BH CV ECHO MEAS - LV MEAN PG: 5.2 MMHG
BH CV ECHO MEAS - LV SYSTOLIC VOL/BSA (12-30): 14.7 CM2
BH CV ECHO MEAS - LV V1 MAX: 158.9 CM/SEC
BH CV ECHO MEAS - LV V1 VTI: 30.7 CM
BH CV ECHO MEAS - LVIDD: 4.1 CM
BH CV ECHO MEAS - LVIDS: 2.6 CM
BH CV ECHO MEAS - LVOT AREA: 3.6 CM2
BH CV ECHO MEAS - LVOT DIAM: 2.14 CM
BH CV ECHO MEAS - LVPWD: 0.99 CM
BH CV ECHO MEAS - MED PEAK E' VEL: 7.1 CM/SEC
BH CV ECHO MEAS - MV A MAX VEL: 111.8 CM/SEC
BH CV ECHO MEAS - MV DEC SLOPE: 390.5 CM/SEC2
BH CV ECHO MEAS - MV DEC TIME: 0.23 SEC
BH CV ECHO MEAS - MV E MAX VEL: 88.7 CM/SEC
BH CV ECHO MEAS - MV E/A: 0.79
BH CV ECHO MEAS - MV MAX PG: 8 MMHG
BH CV ECHO MEAS - MV MEAN PG: 2.43 MMHG
BH CV ECHO MEAS - MV V2 VTI: 57.3 CM
BH CV ECHO MEAS - MVA(VTI): 1.93 CM2
BH CV ECHO MEAS - RVDD: 3.1 CM
BH CV ECHO MEAS - SV(LVOT): 110.7 ML
BH CV ECHO MEAS - SV(MOD-SP2): 64.3 ML
BH CV ECHO MEAS - SV(MOD-SP4): 54.6 ML
BH CV ECHO MEAS - SVI(LVOT): 48.9 ML/M2
BH CV ECHO MEAS - SVI(MOD-SP2): 28.4 ML/M2
BH CV ECHO MEAS - SVI(MOD-SP4): 24.1 ML/M2
BH CV ECHO MEAS - TAPSE (>1.6): 2.09 CM
BH CV ECHO MEASUREMENTS AVERAGE E/E' RATIO: 11.02
BH CV IMMEDIATE POST RECOVERY TECH DATA SYMPTOMS: NORMAL
BH CV IMMEDIATE POST TECH DATA BLOOD PRESSURE: NORMAL MMHG
BH CV IMMEDIATE POST TECH DATA HEART RATE: 86 BPM
BH CV IMMEDIATE POST TECH DATA OXYGEN SATS: 95 %
BH CV NINE MINUTE RECOVERY TECH DATA BLOOD PRESSURE: NORMAL MMHG
BH CV NINE MINUTE RECOVERY TECH DATA HEART RATE: 61 BPM
BH CV NINE MINUTE RECOVERY TECH DATA OXYGEN SATURATION: 95 %
BH CV NINE MINUTE RECOVERY TECH DATA SYMPTOMS: NORMAL
BH CV SIX MINUTE RECOVERY TECH DATA BLOOD PRESSURE: NORMAL
BH CV SIX MINUTE RECOVERY TECH DATA HEART RATE: 69 BPM
BH CV SIX MINUTE RECOVERY TECH DATA OXYGEN SATURATION: 95 %
BH CV SIX MINUTE RECOVERY TECH DATA SYMPTOMS: NORMAL
BH CV STRESS BP STAGE 1: NORMAL
BH CV STRESS BP STAGE 2: NORMAL
BH CV STRESS BP STAGE 3: NORMAL
BH CV STRESS BP STAGE 4: NORMAL
BH CV STRESS DURATION MIN STAGE 1: 3
BH CV STRESS DURATION MIN STAGE 2: 3
BH CV STRESS DURATION MIN STAGE 3: 3
BH CV STRESS DURATION MIN STAGE 4: 2
BH CV STRESS DURATION SEC STAGE 1: 0
BH CV STRESS DURATION SEC STAGE 2: 0
BH CV STRESS DURATION SEC STAGE 3: 0
BH CV STRESS DURATION SEC STAGE 4: 0
BH CV STRESS GRADE STAGE 1: 0
BH CV STRESS GRADE STAGE 2: 5
BH CV STRESS GRADE STAGE 3: 10
BH CV STRESS GRADE STAGE 4: 12
BH CV STRESS HR STAGE 1: 75
BH CV STRESS HR STAGE 2: 87
BH CV STRESS HR STAGE 3: 119
BH CV STRESS HR STAGE 4: 124
BH CV STRESS METS STAGE 1: 2.3
BH CV STRESS METS STAGE 2: 3.5
BH CV STRESS METS STAGE 3: 4.6
BH CV STRESS METS STAGE 4: 7
BH CV STRESS O2 STAGE 1: 94
BH CV STRESS O2 STAGE 2: 94
BH CV STRESS O2 STAGE 3: 94
BH CV STRESS O2 STAGE 4: 94
BH CV STRESS PROTOCOL 1: NORMAL
BH CV STRESS RECOVERY BP: NORMAL MMHG
BH CV STRESS RECOVERY HR: 62 BPM
BH CV STRESS RECOVERY O2: 95 %
BH CV STRESS SPEED STAGE 1: 1.7
BH CV STRESS SPEED STAGE 2: 1.7
BH CV STRESS SPEED STAGE 3: 2.4
BH CV STRESS SPEED STAGE 4: 2.4
BH CV STRESS STAGE 1: 1
BH CV STRESS STAGE 2: 2
BH CV STRESS STAGE 3: 3
BH CV STRESS STAGE 4: 4
BH CV THREE MINUTE POST TECH DATA BLOOD PRESSURE: NORMAL MMHG
BH CV THREE MINUTE POST TECH DATA HEART RATE: 75 BPM
BH CV THREE MINUTE POST TECH DATA OXYGEN SATURATION: 95 %
BH CV TWELVE MINUTE RECOVERY TECH DATA BLOOD PRESSURE: NORMAL MMHG
BH CV TWELVE MINUTE RECOVERY TECH DATA HEART RATE: 62 BPM
BH CV TWELVE MINUTE RECOVERY TECH DATA OXYGEN SATURATION: 95 %
BH CV TWELVE MINUTE RECOVERY TECH DATA SYMPTOMS: NORMAL
BH CV XLRA - TDI S': 13.8 CM/SEC
IVRT: 55 MS
LEFT ATRIUM VOLUME INDEX: 36.5 ML/M2
LV EF BIPLANE MOD: 62.4 %
MAXIMAL PREDICTED HEART RATE: 143 BPM
PERCENT MAX PREDICTED HR: 86.71 %
STRESS BASELINE BP: NORMAL MMHG
STRESS BASELINE HR: 63 BPM
STRESS O2 SAT REST: 92 %
STRESS PERCENT HR: 102 %
STRESS POST ESTIMATED WORKLOAD: 6.7 METS
STRESS POST EXERCISE DUR MIN: 11 MIN
STRESS POST EXERCISE DUR SEC: 0 SEC
STRESS POST O2 SAT PEAK: 94 %
STRESS POST PEAK BP: NORMAL MMHG
STRESS POST PEAK HR: 124 BPM
STRESS TARGET HR: 122 BPM

## 2025-03-17 NOTE — TELEPHONE ENCOUNTER
Per Elida:  Stress echocardiogram shows no evidence of coronary blockages, shows normal heart muscle function and no valve disease noted. Follow-up as scheduled. Notify office of any new or worsening cardiac symptoms.    Spoke with patient, patient is aware and verbalized understanding.

## (undated) DEVICE — STERILE PATIENT PROTECTIVE PAD FOR IMP® KNEE POSITIONERS & COHESIVE WRAP (10 / CASE): Brand: DE MAYO KNEE POSITIONER®

## (undated) DEVICE — Y-TYPE TUR/BLADDER IRRIGATION SET, REGULATING CLAMP

## (undated) DEVICE — THE SINGLE USE ETRAP – POLYP TRAP IS USED FOR SUCTION RETRIEVAL OF ENDOSCOPICALLY REMOVED POLYPS.: Brand: ETRAP

## (undated) DEVICE — INTENDED FOR TISSUE SEPARATION, AND OTHER PROCEDURES THAT REQUIRE A SHARP SURGICAL BLADE TO PUNCTURE OR CUT.: Brand: BARD-PARKER ® CARBON RIB-BACK BLADES

## (undated) DEVICE — GLV SURG SENSICARE SLT PF LF 6.5 STRL

## (undated) DEVICE — UNDERCAST PADDING: Brand: DEROYAL

## (undated) DEVICE — GLV SURG BIOGEL LTX PF 8 1/2

## (undated) DEVICE — Device

## (undated) DEVICE — STRYKER PERFORMANCE SERIES SAGITTAL BLADE: Brand: STRYKER PERFORMANCE SERIES

## (undated) DEVICE — SOL IRRG H2O PL/BG 1000ML STRL

## (undated) DEVICE — DISPOSABLE TOURNIQUET CUFF SINGLE BLADDER, SINGLE PORT AND QUICK CONNECT CONNECTOR: Brand: COLOR CUFF

## (undated) DEVICE — SUT VIC 2/0 CT1 36IN

## (undated) DEVICE — SKIN PREP TRAY W/CHG: Brand: MEDLINE INDUSTRIES, INC.

## (undated) DEVICE — SYR LUERLOK 30CC

## (undated) DEVICE — PLASMABLADE PS200-040 4.0: Brand: PLASMABLADE™

## (undated) DEVICE — SINGLE-USE BIOPSY FORCEPS: Brand: RADIAL JAW 4

## (undated) DEVICE — SOL IRR NACL 0.9PCT 3000ML

## (undated) DEVICE — CYSTO PACK: Brand: MEDLINE INDUSTRIES, INC.

## (undated) DEVICE — EGD OR ERCP KIT: Brand: MEDLINE INDUSTRIES, INC.

## (undated) DEVICE — GLV SURG SENSICARE PI PF LF 7 GRN STRL

## (undated) DEVICE — Device: Brand: DEFENDO AIR/WATER/SUCTION AND BIOPSY VALVE

## (undated) DEVICE — GLV SURG SENSICARE SLT PF LF 8 STRL

## (undated) DEVICE — GLV SURG SENSICARE SLT PF LF 8.5 STRL

## (undated) DEVICE — NDL HYPO ECLPS SFTY 18G 1 1/2IN

## (undated) DEVICE — CONN JET HYDRA H20 AUXILIARY DISP

## (undated) DEVICE — FAN SPRAY KIT: Brand: PULSAVAC®

## (undated) DEVICE — NO-SCRATCH ™ SMALL WHITNEY CURETTE ™ IS A SINGLE-USE, PLASTIC CURETTE FOR QUICKLY APPLYING, MANIPULATING AND REMOVING BONE CEMENT DURING HIP AND KNEE REPLACEMENT SURGERY. THE PLASTIC IS SOFTER THAN STEEL INSTRUMENTS, REDUCING THE RISK OF DAMAGING THE PROSTHESIS WITH METAL INSTRUMENTS.  THE CURETTE’S 6MM TIP REMOVES EXCESS CEMENT FROM REPLACEMENT HIPS AND KNEES. EASY-TO-MANEUVER, THE SMALL BLUE CURETTE LETS YOU REMOVE CEMENT FROM ALL EDGES OF THE PROSTHESIS.NO-SCRATCH WHITNEY SMALL CURETTE FEATURES:SAFER THAN STEEL- MADE OF PLASTIC - STURDY YET SOFTER THAN SURGICAL STEEL.HANDIER- EACH TOOL HAS A MOLDED-IN THUMB INDENTATION INSTANTLY ORIENTING THE TOOL.- EASIER TO MANEUVER IN HARD TO SEE PLACES.- COLOR-CODED FOR EASY IDENTIFICATION.FASTER- COMES INDIVIDUALLY PACKAGED IN STERILE, PEEL OPEN POUCH, READY TO GO.- APPLIES, MANIPULATES, OR REMOVES CEMENT WITH FINGERTIP PRECISION.ECONOMICAL- THE COST OF A SINGLE REVISION DWARFS THE COST OF A SINGLE-USE CURETTE. - DISPOSABLE – THERE’S NO NEED TO WASTE TIME REMOVING HARDENED CEMENT OR RE-STERILIZING TOOLS.- LESS EXPENSIVE TO BUY AND INVENTORY - ORDER ONLY THE TOOL YOU USE.- PACKAGED 25 INDIVIDUALLY WRAPPED TOOLS TO A CARTON FOR CONVENIENT SHELF STORAGE.: Brand: WHITNEY NO-SCRATCH CURETTE (SMALL)

## (undated) DEVICE — TOWEL,OR,DSP,ST,BLUE,STD,4/PK,20PK/CS: Brand: MEDLINE

## (undated) DEVICE — TOTAL KNEE-LF: Brand: MEDLINE INDUSTRIES, INC.

## (undated) DEVICE — 450 ML BOTTLE OF 0.05% CHLORHEXIDINE GLUCONATE IN 99.95% STERILE WATER FOR IRRIGATION, USP AND APPLICATOR.: Brand: IRRISEPT ANTIMICROBIAL WOUND LAVAGE

## (undated) DEVICE — PROXIMATE RH ROTATING HEAD SKIN STAPLERS (35 WIDE) CONTAINS 35 STAINLESS STEEL STAPLES: Brand: PROXIMATE

## (undated) DEVICE — SNAR POLYP CAPTIFLEX XS/OVL 11X2.4MM 240CM 1P/U

## (undated) DEVICE — BNDG ELAS MATRX V/CLS 6INX10YD LF

## (undated) DEVICE — ANTIBACTERIAL VIOLET BRAIDED (POLYGLACTIN 910), SYNTHETIC ABSORBABLE SURGICAL SUTURE: Brand: COATED VICRYL

## (undated) DEVICE — BASIC SINGLE BASIN-LF: Brand: MEDLINE INDUSTRIES, INC.

## (undated) DEVICE — GW PTFE/COAT STR/TP STFF/BDY .038 150CM STRL EA/5

## (undated) DEVICE — MAT FLR ABS W/BLU/LINER 56X72IN WHT

## (undated) DEVICE — GW SUREGLIDE FLXTIP ANG/TP .038 3X150CM EA/5

## (undated) DEVICE — GAUZE,SPONGE,4"X4",16PLY,STRL,LF,10/TRAY: Brand: MEDLINE

## (undated) DEVICE — GLV SURG SENSICARE SLT PF LF 7 STRL

## (undated) DEVICE — CATH URETRL FLXITP POLLACK STD 5F 70CM

## (undated) DEVICE — APPL CHLORAPREP HI/LITE 26ML ORNG

## (undated) DEVICE — SOLIDIFIER LIQLOC PLS 1500CC BT

## (undated) DEVICE — SLV SCD KN/LEN ADJ EXPRSS BLENDED MD 1P/U

## (undated) DEVICE — PULLOVER TOGA, 2X LARGE: Brand: FLYTE, SURGICOOL

## (undated) DEVICE — CVR LEG BOOTLEG F/R NOSKID 33IN

## (undated) DEVICE — SYS IRR PUMP SGL ACTN VAC SYR 10CC

## (undated) DEVICE — 3 BONE CEMENT MIXER: Brand: MIXEVAC

## (undated) DEVICE — LINER SURG CANSTR SXN S/RIGD 1500CC